# Patient Record
Sex: FEMALE | Race: BLACK OR AFRICAN AMERICAN | Employment: OTHER | URBAN - METROPOLITAN AREA
[De-identification: names, ages, dates, MRNs, and addresses within clinical notes are randomized per-mention and may not be internally consistent; named-entity substitution may affect disease eponyms.]

---

## 2017-02-15 PROBLEM — D22.9 ATYPICAL NEVI: Status: ACTIVE | Noted: 2017-02-15

## 2017-05-10 PROBLEM — M17.12 OSTEOARTHRITIS OF LEFT KNEE: Status: ACTIVE | Noted: 2017-05-10

## 2017-05-11 PROBLEM — K44.9 HIATAL HERNIA: Status: ACTIVE | Noted: 2017-05-11

## 2017-05-11 PROBLEM — R60.0 LOCALIZED EDEMA: Status: ACTIVE | Noted: 2017-05-11

## 2017-05-19 ENCOUNTER — HOSPITAL ENCOUNTER (OUTPATIENT)
Age: 67
Discharge: HOME | End: 2017-05-19
Payer: MEDICARE

## 2017-05-19 DIAGNOSIS — R73.09 HEMOGLOBIN A1C ABOVE REFERENCE RANGE: ICD-10-CM

## 2017-05-19 LAB
ALBUMIN SERPL-MCNC: 3.9 G/DL (ref 3.5–5.2)
ALBUMIN/GLOBULIN RATIO: ABNORMAL (ref 1–2.5)
ALP BLD-CCNC: 105 U/L (ref 35–104)
ALT SERPL-CCNC: 11 U/L (ref 5–33)
ANION GAP SERPL CALCULATED.3IONS-SCNC: 14 MMOL/L (ref 9–17)
AST SERPL-CCNC: 15 U/L
BILIRUB SERPL-MCNC: 0.2 MG/DL (ref 0.3–1.2)
BUN BLDV-MCNC: 11 MG/DL (ref 8–23)
BUN/CREAT BLD: 10 (ref 9–20)
CALCIUM SERPL-MCNC: 9.8 MG/DL (ref 8.6–10.4)
CHLORIDE BLD-SCNC: 98 MMOL/L (ref 98–107)
CO2: 28 MMOL/L (ref 20–31)
CREAT SERPL-MCNC: 1.1 MG/DL (ref 0.5–0.9)
GFR AFRICAN AMERICAN: >60 ML/MIN
GFR NON-AFRICAN AMERICAN: 50 ML/MIN
GFR SERPL CREATININE-BSD FRML MDRD: ABNORMAL ML/MIN/{1.73_M2}
GFR SERPL CREATININE-BSD FRML MDRD: ABNORMAL ML/MIN/{1.73_M2}
GLUCOSE FASTING: 100 MG/DL (ref 70–99)
POTASSIUM SERPL-SCNC: 4.4 MMOL/L (ref 3.7–5.3)
SODIUM BLD-SCNC: 140 MMOL/L (ref 135–144)
TOTAL PROTEIN: 8 G/DL (ref 6.4–8.3)

## 2017-05-19 PROCEDURE — 36415 COLL VENOUS BLD VENIPUNCTURE: CPT

## 2017-05-19 PROCEDURE — 80053 COMPREHEN METABOLIC PANEL: CPT

## 2017-08-14 ENCOUNTER — HOSPITAL ENCOUNTER (OUTPATIENT)
Age: 67
Discharge: HOME | End: 2017-08-14
Payer: MEDICARE

## 2017-08-14 ENCOUNTER — HOSPITAL ENCOUNTER (OUTPATIENT)
Dept: GENERAL RADIOLOGY | Age: 67
Discharge: HOME | End: 2017-08-14
Payer: MEDICARE

## 2017-08-14 DIAGNOSIS — R73.09 HEMOGLOBIN A1C ABOVE REFERENCE RANGE: ICD-10-CM

## 2017-08-14 DIAGNOSIS — E03.9 HYPOTHYROIDISM, UNSPECIFIED TYPE: ICD-10-CM

## 2017-08-14 DIAGNOSIS — M51.36 DDD (DEGENERATIVE DISC DISEASE), LUMBAR: ICD-10-CM

## 2017-08-14 LAB
ABSOLUTE EOS #: 0.5 K/UL (ref 0–0.4)
ABSOLUTE LYMPH #: 2.6 K/UL (ref 1–4.8)
ABSOLUTE MONO #: 0.8 K/UL (ref 0.2–0.8)
ALBUMIN SERPL-MCNC: 4.1 G/DL (ref 3.5–5.2)
ALBUMIN/GLOBULIN RATIO: ABNORMAL (ref 1–2.5)
ALP BLD-CCNC: 105 U/L (ref 35–104)
ALT SERPL-CCNC: 14 U/L (ref 5–33)
ANION GAP SERPL CALCULATED.3IONS-SCNC: 12 MMOL/L (ref 9–17)
AST SERPL-CCNC: 13 U/L
BASOPHILS # BLD: 1 %
BASOPHILS ABSOLUTE: 0.1 K/UL (ref 0–0.2)
BILIRUB SERPL-MCNC: 0.24 MG/DL (ref 0.3–1.2)
BUN BLDV-MCNC: 17 MG/DL (ref 8–23)
BUN/CREAT BLD: 15 (ref 9–20)
CALCIUM SERPL-MCNC: 10 MG/DL (ref 8.6–10.4)
CHLORIDE BLD-SCNC: 100 MMOL/L (ref 98–107)
CHOLESTEROL/HDL RATIO: 3.7
CHOLESTEROL: 172 MG/DL
CO2: 30 MMOL/L (ref 20–31)
CREAT SERPL-MCNC: 1.16 MG/DL (ref 0.5–0.9)
DIFFERENTIAL TYPE: ABNORMAL
EOSINOPHILS RELATIVE PERCENT: 4 %
ESTIMATED AVERAGE GLUCOSE: 131 MG/DL
GFR AFRICAN AMERICAN: 56 ML/MIN
GFR NON-AFRICAN AMERICAN: 47 ML/MIN
GFR SERPL CREATININE-BSD FRML MDRD: ABNORMAL ML/MIN/{1.73_M2}
GFR SERPL CREATININE-BSD FRML MDRD: ABNORMAL ML/MIN/{1.73_M2}
GLUCOSE BLD-MCNC: 108 MG/DL (ref 70–99)
HBA1C MFR BLD: 6.2 % (ref 4–6)
HCT VFR BLD CALC: 44.6 % (ref 36–46)
HDLC SERPL-MCNC: 46 MG/DL
HEMOGLOBIN: 14.1 G/DL (ref 12–16)
INSULIN COMMENT: NORMAL
INSULIN REFERENCE RANGE:: NORMAL
INSULIN: 23.3 MU/L
LDL CHOLESTEROL: 90 MG/DL (ref 0–130)
LYMPHOCYTES # BLD: 20 %
MCH RBC QN AUTO: 26.4 PG (ref 26–34)
MCHC RBC AUTO-ENTMCNC: 31.6 G/DL (ref 31–37)
MCV RBC AUTO: 83.5 FL (ref 80–100)
MONOCYTES # BLD: 7 %
PDW BLD-RTO: 16.8 % (ref 11.5–14.5)
PLATELET # BLD: 472 K/UL (ref 130–400)
PLATELET ESTIMATE: ABNORMAL
PMV BLD AUTO: 7.1 FL (ref 6–12)
POTASSIUM SERPL-SCNC: 5 MMOL/L (ref 3.7–5.3)
RBC # BLD: 5.34 M/UL (ref 4–5.2)
RBC # BLD: ABNORMAL 10*6/UL
SEG NEUTROPHILS: 68 %
SEGMENTED NEUTROPHILS ABSOLUTE COUNT: 8.7 K/UL (ref 1.8–7.7)
SODIUM BLD-SCNC: 142 MMOL/L (ref 135–144)
T3 FREE: 2.88 PG/ML (ref 2.02–4.43)
TOTAL PROTEIN: 8.1 G/DL (ref 6.4–8.3)
TRIGL SERPL-MCNC: 181 MG/DL
TSH SERPL DL<=0.05 MIU/L-ACNC: 3.47 MIU/L (ref 0.3–5)
VLDLC SERPL CALC-MCNC: ABNORMAL MG/DL (ref 1–30)
WBC # BLD: 12.7 K/UL (ref 3.5–11)
WBC # BLD: ABNORMAL 10*3/UL

## 2017-08-14 PROCEDURE — 84481 FREE ASSAY (FT-3): CPT

## 2017-08-14 PROCEDURE — 72110 X-RAY EXAM L-2 SPINE 4/>VWS: CPT

## 2017-08-14 PROCEDURE — 83525 ASSAY OF INSULIN: CPT

## 2017-08-14 PROCEDURE — 36415 COLL VENOUS BLD VENIPUNCTURE: CPT

## 2017-08-14 PROCEDURE — 85025 COMPLETE CBC W/AUTO DIFF WBC: CPT

## 2017-08-14 PROCEDURE — 80061 LIPID PANEL: CPT

## 2017-08-14 PROCEDURE — 84443 ASSAY THYROID STIM HORMONE: CPT

## 2017-08-14 PROCEDURE — 80053 COMPREHEN METABOLIC PANEL: CPT

## 2017-08-14 PROCEDURE — 83036 HEMOGLOBIN GLYCOSYLATED A1C: CPT

## 2017-08-28 PROBLEM — D72.829 LEUKOCYTOSIS: Status: ACTIVE | Noted: 2017-08-28

## 2017-08-28 PROBLEM — M47.816 OSTEOARTHRITIS OF LUMBAR SPINE: Status: ACTIVE | Noted: 2017-08-28

## 2017-08-28 PROBLEM — M47.812 OSTEOARTHRITIS OF CERVICAL SPINE: Status: ACTIVE | Noted: 2017-08-28

## 2018-06-01 ENCOUNTER — HOSPITAL ENCOUNTER (OUTPATIENT)
Age: 68
Discharge: HOME | End: 2018-06-01
Payer: MEDICARE

## 2018-06-01 DIAGNOSIS — R73.09 HEMOGLOBIN A1C ABOVE REFERENCE RANGE: ICD-10-CM

## 2018-06-01 LAB
ALBUMIN SERPL-MCNC: 3.9 G/DL (ref 3.5–5.2)
ALBUMIN/GLOBULIN RATIO: ABNORMAL (ref 1–2.5)
ALP BLD-CCNC: 113 U/L (ref 35–104)
ALT SERPL-CCNC: 13 U/L (ref 5–33)
ANION GAP SERPL CALCULATED.3IONS-SCNC: 12 MMOL/L (ref 9–17)
AST SERPL-CCNC: 14 U/L
BILIRUB SERPL-MCNC: 0.26 MG/DL (ref 0.3–1.2)
BUN BLDV-MCNC: 9 MG/DL (ref 8–23)
BUN/CREAT BLD: 8 (ref 9–20)
CALCIUM SERPL-MCNC: 9.6 MG/DL (ref 8.6–10.4)
CHLORIDE BLD-SCNC: 101 MMOL/L (ref 98–107)
CO2: 28 MMOL/L (ref 20–31)
CREAT SERPL-MCNC: 1.09 MG/DL (ref 0.5–0.9)
GFR AFRICAN AMERICAN: >60 ML/MIN
GFR NON-AFRICAN AMERICAN: 50 ML/MIN
GFR SERPL CREATININE-BSD FRML MDRD: ABNORMAL ML/MIN/{1.73_M2}
GFR SERPL CREATININE-BSD FRML MDRD: ABNORMAL ML/MIN/{1.73_M2}
GLUCOSE BLD-MCNC: 107 MG/DL (ref 70–99)
POTASSIUM SERPL-SCNC: 4.5 MMOL/L (ref 3.7–5.3)
SODIUM BLD-SCNC: 141 MMOL/L (ref 135–144)
TOTAL PROTEIN: 8 G/DL (ref 6.4–8.3)

## 2018-06-01 PROCEDURE — 80053 COMPREHEN METABOLIC PANEL: CPT

## 2018-06-01 PROCEDURE — 36415 COLL VENOUS BLD VENIPUNCTURE: CPT

## 2018-06-01 PROCEDURE — 83036 HEMOGLOBIN GLYCOSYLATED A1C: CPT

## 2018-06-03 LAB
ESTIMATED AVERAGE GLUCOSE: 134 MG/DL
HBA1C MFR BLD: 6.3 % (ref 4–6)

## 2018-07-03 PROBLEM — G47.00 INSOMNIA: Status: ACTIVE | Noted: 2018-07-03

## 2018-09-19 ENCOUNTER — HOSPITAL ENCOUNTER (OUTPATIENT)
Dept: CT IMAGING | Age: 68
Discharge: HOME | End: 2018-09-21
Payer: MEDICARE

## 2018-09-19 DIAGNOSIS — M54.16 LUMBAR RADICULOPATHY: ICD-10-CM

## 2018-09-19 PROCEDURE — 72131 CT LUMBAR SPINE W/O DYE: CPT

## 2018-11-26 ENCOUNTER — HOSPITAL ENCOUNTER (OUTPATIENT)
Age: 68
Discharge: HOME | End: 2018-11-26
Payer: MEDICARE

## 2018-11-26 DIAGNOSIS — R73.09 HEMOGLOBIN A1C ABOVE REFERENCE RANGE: ICD-10-CM

## 2018-11-26 DIAGNOSIS — I10 ESSENTIAL HYPERTENSION: ICD-10-CM

## 2018-11-26 DIAGNOSIS — E78.1 HIGH TRIGLYCERIDES: ICD-10-CM

## 2018-11-26 DIAGNOSIS — D72.829 LEUKOCYTOSIS, UNSPECIFIED TYPE: ICD-10-CM

## 2018-11-26 DIAGNOSIS — E55.9 VITAMIN D DEFICIENCY: ICD-10-CM

## 2018-11-26 DIAGNOSIS — E03.9 HYPOTHYROIDISM, UNSPECIFIED TYPE: ICD-10-CM

## 2018-11-26 PROBLEM — E66.01 MORBID OBESITY (HCC): Status: ACTIVE | Noted: 2018-11-26

## 2018-11-26 LAB
ABSOLUTE EOS #: 0.37 K/UL (ref 0–0.44)
ABSOLUTE IMMATURE GRANULOCYTE: 0.1 K/UL (ref 0–0.3)
ABSOLUTE LYMPH #: 2.57 K/UL (ref 1.1–3.7)
ABSOLUTE MONO #: 0.94 K/UL (ref 0.1–1.2)
ALBUMIN SERPL-MCNC: 3.9 G/DL (ref 3.5–5.2)
ALBUMIN/GLOBULIN RATIO: 0.9 (ref 1–2.5)
ALP BLD-CCNC: 121 U/L (ref 35–104)
ALT SERPL-CCNC: 19 U/L (ref 5–33)
ANION GAP SERPL CALCULATED.3IONS-SCNC: 13 MMOL/L (ref 9–17)
AST SERPL-CCNC: 19 U/L
BASOPHILS # BLD: 1 % (ref 0–2)
BASOPHILS ABSOLUTE: 0.09 K/UL (ref 0–0.2)
BILIRUB SERPL-MCNC: 0.24 MG/DL (ref 0.3–1.2)
BUN BLDV-MCNC: 12 MG/DL (ref 8–23)
BUN/CREAT BLD: ABNORMAL (ref 9–20)
CALCIUM SERPL-MCNC: 9.6 MG/DL (ref 8.6–10.4)
CHLORIDE BLD-SCNC: 101 MMOL/L (ref 98–107)
CHOLESTEROL, FASTING: 152 MG/DL
CHOLESTEROL/HDL RATIO: 3.2
CO2: 27 MMOL/L (ref 20–31)
CREAT SERPL-MCNC: 1.12 MG/DL (ref 0.5–0.9)
DIFFERENTIAL TYPE: ABNORMAL
EOSINOPHILS RELATIVE PERCENT: 2 % (ref 1–4)
GFR AFRICAN AMERICAN: 58 ML/MIN
GFR NON-AFRICAN AMERICAN: 48 ML/MIN
GFR SERPL CREATININE-BSD FRML MDRD: ABNORMAL ML/MIN/{1.73_M2}
GFR SERPL CREATININE-BSD FRML MDRD: ABNORMAL ML/MIN/{1.73_M2}
GLUCOSE BLD-MCNC: 95 MG/DL (ref 70–99)
HCT VFR BLD CALC: 46.3 % (ref 36.3–47.1)
HDLC SERPL-MCNC: 47 MG/DL
HEMOGLOBIN: 13.3 G/DL (ref 11.9–15.1)
IMMATURE GRANULOCYTES: 1 %
INSULIN COMMENT: NORMAL
INSULIN REFERENCE RANGE:: NORMAL
INSULIN: 17.7 MU/L
LDL CHOLESTEROL: 85 MG/DL (ref 0–130)
LYMPHOCYTES # BLD: 17 % (ref 24–43)
MCH RBC QN AUTO: 24.8 PG (ref 25.2–33.5)
MCHC RBC AUTO-ENTMCNC: 28.7 G/DL (ref 28.4–34.8)
MCV RBC AUTO: 86.4 FL (ref 82.6–102.9)
MONOCYTES # BLD: 6 % (ref 3–12)
NRBC AUTOMATED: 0 PER 100 WBC
PDW BLD-RTO: 15.9 % (ref 11.8–14.4)
PLATELET # BLD: 495 K/UL (ref 138–453)
PLATELET ESTIMATE: ABNORMAL
PMV BLD AUTO: 9.1 FL (ref 8.1–13.5)
POTASSIUM SERPL-SCNC: 4.5 MMOL/L (ref 3.7–5.3)
RBC # BLD: 5.36 M/UL (ref 3.95–5.11)
RBC # BLD: ABNORMAL 10*6/UL
SEG NEUTROPHILS: 73 % (ref 36–65)
SEGMENTED NEUTROPHILS ABSOLUTE COUNT: 11.24 K/UL (ref 1.5–8.1)
SODIUM BLD-SCNC: 141 MMOL/L (ref 135–144)
T3 FREE: 2.05 PG/ML (ref 2.02–4.43)
THYROXINE, FREE: 0.93 NG/DL (ref 0.93–1.7)
TOTAL PROTEIN: 8.1 G/DL (ref 6.4–8.3)
TRIGLYCERIDE, FASTING: 98 MG/DL
TSH SERPL DL<=0.05 MIU/L-ACNC: 1.59 MIU/L (ref 0.3–5)
VLDLC SERPL CALC-MCNC: NORMAL MG/DL (ref 1–30)
WBC # BLD: 15.3 K/UL (ref 3.5–11.3)
WBC # BLD: ABNORMAL 10*3/UL

## 2018-11-26 PROCEDURE — 84439 ASSAY OF FREE THYROXINE: CPT

## 2018-11-26 PROCEDURE — 83036 HEMOGLOBIN GLYCOSYLATED A1C: CPT

## 2018-11-26 PROCEDURE — 84481 FREE ASSAY (FT-3): CPT

## 2018-11-26 PROCEDURE — 82652 VIT D 1 25-DIHYDROXY: CPT

## 2018-11-26 PROCEDURE — 83525 ASSAY OF INSULIN: CPT

## 2018-11-26 PROCEDURE — 36415 COLL VENOUS BLD VENIPUNCTURE: CPT

## 2018-11-26 PROCEDURE — 80061 LIPID PANEL: CPT

## 2018-11-26 PROCEDURE — 80053 COMPREHEN METABOLIC PANEL: CPT

## 2018-11-26 PROCEDURE — 84443 ASSAY THYROID STIM HORMONE: CPT

## 2018-11-26 PROCEDURE — 85025 COMPLETE CBC W/AUTO DIFF WBC: CPT

## 2018-11-26 PROCEDURE — 84482 T3 REVERSE: CPT

## 2018-11-27 LAB
ESTIMATED AVERAGE GLUCOSE: 128 MG/DL
HBA1C MFR BLD: 6.1 % (ref 4–6)

## 2018-11-29 LAB
T3 REVERSE: 18.3 NG/DL (ref 9–27)
VITAMIN D 1,25-DIHYDROXY: 59.3 PG/ML (ref 19.9–79.3)

## 2018-12-05 ENCOUNTER — HOSPITAL ENCOUNTER (OUTPATIENT)
Dept: MAMMOGRAPHY | Age: 68
Discharge: HOME | End: 2018-12-07
Payer: MEDICARE

## 2018-12-05 DIAGNOSIS — Z12.39 SCREENING FOR BREAST CANCER: ICD-10-CM

## 2018-12-05 PROCEDURE — 77067 SCR MAMMO BI INCL CAD: CPT

## 2019-02-06 ENCOUNTER — HOSPITAL ENCOUNTER (OUTPATIENT)
Age: 69
Discharge: HOME | End: 2019-02-06
Payer: MEDICARE

## 2019-02-06 DIAGNOSIS — D72.829 LEUKOCYTOSIS, UNSPECIFIED TYPE: ICD-10-CM

## 2019-02-06 LAB
ABSOLUTE EOS #: 0.43 K/UL (ref 0–0.44)
ABSOLUTE IMMATURE GRANULOCYTE: 0.08 K/UL (ref 0–0.3)
ABSOLUTE LYMPH #: 3.5 K/UL (ref 1.1–3.7)
ABSOLUTE MONO #: 0.95 K/UL (ref 0.1–1.2)
BASOPHILS # BLD: 1 % (ref 0–2)
BASOPHILS ABSOLUTE: 0.11 K/UL (ref 0–0.2)
DIFFERENTIAL TYPE: ABNORMAL
EOSINOPHILS RELATIVE PERCENT: 3 % (ref 1–4)
HCT VFR BLD CALC: 47.8 % (ref 36.3–47.1)
HEMOGLOBIN: 14.1 G/DL (ref 11.9–15.1)
IMMATURE GRANULOCYTES: 1 %
LYMPHOCYTES # BLD: 25 % (ref 24–43)
MCH RBC QN AUTO: 25.1 PG (ref 25.2–33.5)
MCHC RBC AUTO-ENTMCNC: 29.5 G/DL (ref 28.4–34.8)
MCV RBC AUTO: 85.1 FL (ref 82.6–102.9)
MONOCYTES # BLD: 7 % (ref 3–12)
NRBC AUTOMATED: 0 PER 100 WBC
PDW BLD-RTO: 16.4 % (ref 11.8–14.4)
PLATELET # BLD: 590 K/UL (ref 138–453)
PLATELET ESTIMATE: ABNORMAL
PMV BLD AUTO: 9 FL (ref 8.1–13.5)
RBC # BLD: 5.62 M/UL (ref 3.95–5.11)
RBC # BLD: ABNORMAL 10*6/UL
SEG NEUTROPHILS: 63 % (ref 36–65)
SEGMENTED NEUTROPHILS ABSOLUTE COUNT: 8.89 K/UL (ref 1.5–8.1)
WBC # BLD: 14 K/UL (ref 3.5–11.3)
WBC # BLD: ABNORMAL 10*3/UL

## 2019-02-06 PROCEDURE — 36415 COLL VENOUS BLD VENIPUNCTURE: CPT

## 2019-02-06 PROCEDURE — 85025 COMPLETE CBC W/AUTO DIFF WBC: CPT

## 2019-02-25 PROBLEM — R42 DIZZINESS: Status: ACTIVE | Noted: 2019-02-25

## 2019-03-07 PROBLEM — R35.0 FREQUENT URINATION: Status: ACTIVE | Noted: 2019-03-07

## 2019-04-18 PROBLEM — M25.552 LEFT HIP PAIN: Status: ACTIVE | Noted: 2019-04-18

## 2019-04-18 PROBLEM — G89.29 CHRONIC PAIN OF LEFT KNEE: Status: ACTIVE | Noted: 2019-04-18

## 2019-04-18 PROBLEM — M25.562 CHRONIC PAIN OF LEFT KNEE: Status: ACTIVE | Noted: 2019-04-18

## 2019-04-24 ENCOUNTER — HOSPITAL ENCOUNTER (OUTPATIENT)
Dept: GENERAL RADIOLOGY | Age: 69
Discharge: HOME | End: 2019-04-26
Payer: MEDICARE

## 2019-04-24 ENCOUNTER — HOSPITAL ENCOUNTER (OUTPATIENT)
Age: 69
Discharge: HOME | End: 2019-04-26
Payer: MEDICARE

## 2019-04-24 DIAGNOSIS — M25.562 CHRONIC PAIN OF LEFT KNEE: ICD-10-CM

## 2019-04-24 DIAGNOSIS — G89.29 CHRONIC PAIN OF LEFT KNEE: ICD-10-CM

## 2019-04-24 DIAGNOSIS — M25.552 LEFT HIP PAIN: ICD-10-CM

## 2019-04-24 PROCEDURE — 73502 X-RAY EXAM HIP UNI 2-3 VIEWS: CPT

## 2019-04-24 PROCEDURE — 73562 X-RAY EXAM OF KNEE 3: CPT

## 2019-06-05 PROBLEM — R35.0 FREQUENT URINATION: Status: RESOLVED | Noted: 2019-03-07 | Resolved: 2019-06-05

## 2019-06-17 ENCOUNTER — HOSPITAL ENCOUNTER (OUTPATIENT)
Age: 69
Setting detail: SPECIMEN
Discharge: HOME | End: 2019-06-17
Payer: MEDICARE

## 2019-06-20 LAB — SURGICAL PATHOLOGY REPORT: NORMAL

## 2019-08-14 PROBLEM — Z23 NEED FOR SHINGLES VACCINE: Status: ACTIVE | Noted: 2019-08-14

## 2019-08-14 PROBLEM — Z23 NEED FOR ZOSTER VACCINE: Status: ACTIVE | Noted: 2019-08-14

## 2019-08-14 PROBLEM — Z87.891 PERSONAL HISTORY OF TOBACCO USE: Status: ACTIVE | Noted: 2019-08-14

## 2019-08-14 PROBLEM — Z23 NEED FOR PNEUMOCOCCAL VACCINATION: Status: ACTIVE | Noted: 2019-08-14

## 2019-11-26 ENCOUNTER — HOSPITAL ENCOUNTER (OUTPATIENT)
Age: 69
Discharge: HOME | End: 2019-11-26
Payer: MEDICARE

## 2019-11-26 DIAGNOSIS — E03.9 HYPOTHYROIDISM, UNSPECIFIED TYPE: ICD-10-CM

## 2019-11-26 LAB
ABSOLUTE EOS #: 0.48 K/UL (ref 0–0.44)
ABSOLUTE IMMATURE GRANULOCYTE: 0.05 K/UL (ref 0–0.3)
ABSOLUTE LYMPH #: 2.86 K/UL (ref 1.1–3.7)
ABSOLUTE MONO #: 0.89 K/UL (ref 0.1–1.2)
ALBUMIN SERPL-MCNC: 3.9 G/DL (ref 3.5–5.2)
ALBUMIN/GLOBULIN RATIO: 0.8 (ref 1–2.5)
ALP BLD-CCNC: 111 U/L (ref 35–104)
ALT SERPL-CCNC: 12 U/L (ref 5–33)
ANION GAP SERPL CALCULATED.3IONS-SCNC: 15 MMOL/L (ref 9–17)
AST SERPL-CCNC: 16 U/L
BASOPHILS # BLD: 1 % (ref 0–2)
BASOPHILS ABSOLUTE: 0.09 K/UL (ref 0–0.2)
BILIRUB SERPL-MCNC: 0.28 MG/DL (ref 0.3–1.2)
BUN BLDV-MCNC: 9 MG/DL (ref 8–23)
BUN/CREAT BLD: ABNORMAL (ref 9–20)
CALCIUM SERPL-MCNC: 9.9 MG/DL (ref 8.6–10.4)
CHLORIDE BLD-SCNC: 103 MMOL/L (ref 98–107)
CO2: 25 MMOL/L (ref 20–31)
CREAT SERPL-MCNC: 1.05 MG/DL (ref 0.5–0.9)
DIFFERENTIAL TYPE: ABNORMAL
EOSINOPHILS RELATIVE PERCENT: 4 % (ref 1–4)
ESTIMATED AVERAGE GLUCOSE: 128 MG/DL
GFR AFRICAN AMERICAN: >60 ML/MIN
GFR NON-AFRICAN AMERICAN: 52 ML/MIN
GFR SERPL CREATININE-BSD FRML MDRD: ABNORMAL ML/MIN/{1.73_M2}
GFR SERPL CREATININE-BSD FRML MDRD: ABNORMAL ML/MIN/{1.73_M2}
GLUCOSE BLD-MCNC: 112 MG/DL (ref 70–99)
HBA1C MFR BLD: 6.1 % (ref 4–6)
HCT VFR BLD CALC: 47.5 % (ref 36.3–47.1)
HEMOGLOBIN: 14 G/DL (ref 11.9–15.1)
IMMATURE GRANULOCYTES: 0 %
INSULIN COMMENT: NORMAL
INSULIN REFERENCE RANGE:: NORMAL
INSULIN: 21 MU/L
LYMPHOCYTES # BLD: 25 % (ref 24–43)
MCH RBC QN AUTO: 25 PG (ref 25.2–33.5)
MCHC RBC AUTO-ENTMCNC: 29.5 G/DL (ref 28.4–34.8)
MCV RBC AUTO: 85 FL (ref 82.6–102.9)
MONOCYTES # BLD: 8 % (ref 3–12)
NRBC AUTOMATED: 0 PER 100 WBC
PDW BLD-RTO: 15.6 % (ref 11.8–14.4)
PLATELET # BLD: 553 K/UL (ref 138–453)
PLATELET ESTIMATE: ABNORMAL
PMV BLD AUTO: 9.3 FL (ref 8.1–13.5)
POTASSIUM SERPL-SCNC: 4.4 MMOL/L (ref 3.7–5.3)
RBC # BLD: 5.59 M/UL (ref 3.95–5.11)
RBC # BLD: ABNORMAL 10*6/UL
SEG NEUTROPHILS: 62 % (ref 36–65)
SEGMENTED NEUTROPHILS ABSOLUTE COUNT: 7.26 K/UL (ref 1.5–8.1)
SODIUM BLD-SCNC: 143 MMOL/L (ref 135–144)
T3 FREE: 2.52 PG/ML (ref 2.02–4.43)
THYROXINE, FREE: 0.97 NG/DL (ref 0.93–1.7)
TOTAL PROTEIN: 8.5 G/DL (ref 6.4–8.3)
TSH SERPL DL<=0.05 MIU/L-ACNC: 2.33 MIU/L (ref 0.3–5)
WBC # BLD: 11.6 K/UL (ref 3.5–11.3)
WBC # BLD: ABNORMAL 10*3/UL

## 2019-11-26 PROCEDURE — 84443 ASSAY THYROID STIM HORMONE: CPT

## 2019-11-26 PROCEDURE — 84439 ASSAY OF FREE THYROXINE: CPT

## 2019-11-26 PROCEDURE — 85025 COMPLETE CBC W/AUTO DIFF WBC: CPT

## 2019-11-26 PROCEDURE — 84482 T3 REVERSE: CPT

## 2019-11-26 PROCEDURE — 83525 ASSAY OF INSULIN: CPT

## 2019-11-26 PROCEDURE — 83036 HEMOGLOBIN GLYCOSYLATED A1C: CPT

## 2019-11-26 PROCEDURE — 36415 COLL VENOUS BLD VENIPUNCTURE: CPT

## 2019-11-26 PROCEDURE — 84481 FREE ASSAY (FT-3): CPT

## 2019-11-26 PROCEDURE — 80053 COMPREHEN METABOLIC PANEL: CPT

## 2019-11-29 LAB — T3 REVERSE: 18.1 NG/DL (ref 9–27)

## 2019-12-04 ENCOUNTER — TELEPHONE (OUTPATIENT)
Dept: ONCOLOGY | Age: 69
End: 2019-12-04

## 2019-12-11 ENCOUNTER — HOSPITAL ENCOUNTER (OUTPATIENT)
Dept: CT IMAGING | Age: 69
Discharge: HOME | End: 2019-12-13
Payer: MEDICARE

## 2019-12-11 DIAGNOSIS — Z87.891 PERSONAL HISTORY OF TOBACCO USE: ICD-10-CM

## 2019-12-11 PROCEDURE — G0297 LDCT FOR LUNG CA SCREEN: HCPCS

## 2020-08-27 ENCOUNTER — TELEPHONE (OUTPATIENT)
Dept: INTERVENTIONAL RADIOLOGY/VASCULAR | Age: 70
End: 2020-08-27

## 2020-08-27 ENCOUNTER — OFFICE VISIT (OUTPATIENT)
Dept: ORTHOPEDIC SURGERY | Age: 70
End: 2020-08-27
Payer: MEDICARE

## 2020-08-27 VITALS — WEIGHT: 293 LBS | BODY MASS INDEX: 47.3 KG/M2

## 2020-08-27 PROBLEM — G89.29 CHRONIC LOW BACK PAIN: Status: ACTIVE | Noted: 2020-08-27

## 2020-08-27 PROBLEM — M54.50 CHRONIC LOW BACK PAIN: Status: ACTIVE | Noted: 2020-08-27

## 2020-08-27 PROBLEM — M47.816 FACET ARTHRITIS, DEGENERATIVE, LUMBAR SPINE: Status: ACTIVE | Noted: 2020-08-27

## 2020-08-27 PROCEDURE — G8427 DOCREV CUR MEDS BY ELIG CLIN: HCPCS | Performed by: ORTHOPAEDIC SURGERY

## 2020-08-27 PROCEDURE — G8399 PT W/DXA RESULTS DOCUMENT: HCPCS | Performed by: ORTHOPAEDIC SURGERY

## 2020-08-27 PROCEDURE — 1123F ACP DISCUSS/DSCN MKR DOCD: CPT | Performed by: ORTHOPAEDIC SURGERY

## 2020-08-27 PROCEDURE — 3017F COLORECTAL CA SCREEN DOC REV: CPT | Performed by: ORTHOPAEDIC SURGERY

## 2020-08-27 PROCEDURE — 4040F PNEUMOC VAC/ADMIN/RCVD: CPT | Performed by: ORTHOPAEDIC SURGERY

## 2020-08-27 PROCEDURE — 1090F PRES/ABSN URINE INCON ASSESS: CPT | Performed by: ORTHOPAEDIC SURGERY

## 2020-08-27 PROCEDURE — 4004F PT TOBACCO SCREEN RCVD TLK: CPT | Performed by: ORTHOPAEDIC SURGERY

## 2020-08-27 PROCEDURE — G8417 CALC BMI ABV UP PARAM F/U: HCPCS | Performed by: ORTHOPAEDIC SURGERY

## 2020-08-27 PROCEDURE — 99203 OFFICE O/P NEW LOW 30 MIN: CPT | Performed by: ORTHOPAEDIC SURGERY

## 2020-08-27 NOTE — PROGRESS NOTES
Patient ID: Jaziel Bellamy is a 78 y.o. female. Chief Complaint   Patient presents with    New Patient     lower back pain        HPI    Patient presents with chronic low back pain. Patient has been treated by pain management over an extended period of time with 7 or 8 different procedures. Patient reports her pain is predominantly in the lumbar junction lumbosacral junction with radiation to the upper buttocks. She is walk with a cane for several years. Patient does have a history of a cerebral aneurysm treated with clips. Patient also has complaints of left knee pain. Past Medical History:   Diagnosis Date    Alkaline phosphatase elevation 06/09/2016    stable last 2 check    Bronchitis 10/18/2016    Exercise counseling 3/10/2016    Frequent urination 3/7/2019    Headache(784.0)     Hypertension     Leukocytosis 4/11/2016    Obesity     Stroke ([address]     Viral upper respiratory tract infection 6/9/2016     Past Surgical History:   Procedure Laterality Date    BRAIN SURGERY      for Cerebral aneurysm     Family History   Problem Relation Age of Onset    High Blood Pressure Mother     Diabetes Mother     Alcohol Abuse Father     High Blood Pressure Sister     Diabetes Sister     Cancer Sister         leukemia    Alcohol Abuse Sister     Thyroid Disease Other     Breast Cancer Sister         aunt     Cancer Maternal Aunt         breast     Social History     Occupational History    Not on file   Tobacco Use    Smoking status: Current Every Day Smoker     Packs/day: 1.00     Years: 50.00     Pack years: 50.00     Types: Cigarettes    Smokeless tobacco: Never Used   Substance and Sexual Activity    Alcohol use: No    Drug use: No    Sexual activity: Not Currently        Review of Systems         Physical Exam  Vitals signs and nursing note reviewed. Constitutional:       Appearance: She is well-developed. HENT:      Head: Normocephalic and atraumatic. haveoccurred.

## 2020-09-08 PROBLEM — E78.5 DYSLIPIDEMIA: Status: ACTIVE | Noted: 2020-09-08

## 2020-09-09 RX ORDER — SODIUM CHLORIDE 0.9 % (FLUSH) 0.9 %
10 SYRINGE (ML) INJECTION PRN
Status: CANCELLED | OUTPATIENT
Start: 2020-09-10

## 2020-09-09 RX ORDER — SODIUM CHLORIDE 9 MG/ML
INJECTION, SOLUTION INTRAVENOUS CONTINUOUS
Status: CANCELLED | OUTPATIENT
Start: 2020-09-10

## 2020-09-10 ENCOUNTER — HOSPITAL ENCOUNTER (OUTPATIENT)
Dept: INTERVENTIONAL RADIOLOGY/VASCULAR | Age: 70
Discharge: HOME | End: 2020-09-12
Payer: MEDICARE

## 2020-09-10 ENCOUNTER — HOSPITAL ENCOUNTER (OUTPATIENT)
Dept: CT IMAGING | Age: 70
Discharge: HOME | End: 2020-09-12
Payer: MEDICARE

## 2020-09-10 VITALS
DIASTOLIC BLOOD PRESSURE: 92 MMHG | SYSTOLIC BLOOD PRESSURE: 155 MMHG | TEMPERATURE: 97.1 F | OXYGEN SATURATION: 96 % | RESPIRATION RATE: 20 BRPM | HEART RATE: 80 BPM

## 2020-09-10 VITALS
WEIGHT: 293 LBS | BODY MASS INDEX: 45.99 KG/M2 | DIASTOLIC BLOOD PRESSURE: 79 MMHG | HEIGHT: 67 IN | SYSTOLIC BLOOD PRESSURE: 131 MMHG | OXYGEN SATURATION: 99 % | HEART RATE: 68 BPM | TEMPERATURE: 97.7 F | RESPIRATION RATE: 14 BRPM

## 2020-09-10 PROBLEM — Z00.00 ROUTINE GENERAL MEDICAL EXAMINATION AT A HEALTH CARE FACILITY: Status: ACTIVE | Noted: 2020-09-10

## 2020-09-10 PROBLEM — Z12.31 ENCOUNTER FOR SCREENING MAMMOGRAM FOR BREAST CANCER: Status: ACTIVE | Noted: 2020-09-10

## 2020-09-10 LAB
INR BLD: 1.1
PARTIAL THROMBOPLASTIN TIME: 29.4 SEC (ref 23.9–33.8)
PLATELET # BLD: 428 K/UL (ref 138–453)
PROTHROMBIN TIME: 13.7 SEC (ref 11.5–14.2)

## 2020-09-10 PROCEDURE — 85049 AUTOMATED PLATELET COUNT: CPT

## 2020-09-10 PROCEDURE — 7100000010 HC PHASE II RECOVERY - FIRST 15 MIN

## 2020-09-10 PROCEDURE — 62284 INJECTION FOR MYELOGRAM: CPT | Performed by: RADIOLOGY

## 2020-09-10 PROCEDURE — 85730 THROMBOPLASTIN TIME PARTIAL: CPT

## 2020-09-10 PROCEDURE — 7100000011 HC PHASE II RECOVERY - ADDTL 15 MIN

## 2020-09-10 PROCEDURE — 72132 CT LUMBAR SPINE W/DYE: CPT

## 2020-09-10 PROCEDURE — 2709999900 IR MYELOGRAM LUMBOSACRAL

## 2020-09-10 PROCEDURE — 85610 PROTHROMBIN TIME: CPT

## 2020-09-10 PROCEDURE — 6360000004 HC RX CONTRAST MEDICATION: Performed by: RADIOLOGY

## 2020-09-10 RX ORDER — SODIUM CHLORIDE 9 MG/ML
INJECTION, SOLUTION INTRAVENOUS CONTINUOUS
Status: DISCONTINUED | OUTPATIENT
Start: 2020-09-10 | End: 2020-09-13 | Stop reason: HOSPADM

## 2020-09-10 RX ORDER — ACETAMINOPHEN 325 MG/1
650 TABLET ORAL EVERY 4 HOURS PRN
Status: DISCONTINUED | OUTPATIENT
Start: 2020-09-10 | End: 2020-09-13 | Stop reason: HOSPADM

## 2020-09-10 RX ORDER — SODIUM CHLORIDE 0.9 % (FLUSH) 0.9 %
10 SYRINGE (ML) INJECTION PRN
Status: DISCONTINUED | OUTPATIENT
Start: 2020-09-10 | End: 2020-09-13 | Stop reason: HOSPADM

## 2020-09-10 RX ADMIN — IOPAMIDOL 14 ML: 408 INJECTION, SOLUTION INTRATHECAL at 14:08

## 2020-09-10 ASSESSMENT — PAIN SCALES - GENERAL
PAINLEVEL_OUTOF10: 0
PAINLEVEL_OUTOF10: 0

## 2020-09-10 NOTE — BRIEF OP NOTE
Brief Postoperative Note lumbar Myelogram     Sondra Mendez  YOB: 1950  [id number]    Pre-operative Diagnosis: lumbar pain    Post-operative Diagnosis: Same    Procedure: Fluoro guided Myelogram    Anesthesia: 1% Lidocaine    Surgeons/Assistants: Praneeth Patrick MD    Complications: None    Specimens: were not obtained    Fluoro guided lumbar myelogram with 22 gauge spinal needle performed successfully. 14 ml 200 Isovue contrast injected. CT to follow. Dressing applied. Vital signs were reviewed and were stable after the procedure.       Electronically signed by Praneeth Patrick on 9/10/2020 at 2:34 PM

## 2020-09-10 NOTE — H&P
History and Physical Update    Pt Name: Marilou Landry  MRN: [id number]  YOB: 1950  Date of evaluation: 9/10/2020      [x] I have reviewed the [address] NOTE OF 9/8/2020   By [facility]  which meets the criteria for an Interval History and Physical note COPIED BELOW. .    [x] I have examined  Marilou Landry  There are no changes to the patient who is scheduled for a LUMBAR MYELOGRAM .   The patient denies health changes, fever, chills, productive cough, SOB, chest pain, open sores or wounds. SHE HAS A GREAT DEAL OF TROUBLE MOVING , EVEN IN THE BED. BMI IS 47. SHE DOES NOT TAKE BLOOD THINNERS, SHE DOES NOT HAVE DIABETES. Vital signs: Ht 5' 7\" (1.702 m)   Wt (!) 302 lb (137 kg)   BMI 47.30 kg/m²     Allergies:  Aspirin; Baclofen; and Wellbutrin [bupropion]    Medications:    Prior to Admission medications    Medication Sig Start Date End Date Taking? Authorizing Provider   HYDROcodone-acetaminophen (NORCO) 5-325 MG per tablet Take 1 tablet by mouth every 8 hours as needed for Pain for up to 3 days. Intended supply: 3 days.  Take lowest dose possible to manage pain 9/8/20 9/11/20 Yes Candace Garcia MD   amLODIPine (NORVASC) 10 MG tablet Take 1 tablet by mouth daily 8/18/20  Yes Candace Garcia MD   levothyroxine (SYNTHROID) 75 MCG tablet Take 1 tablet by mouth Daily 8/18/20  Yes Candace Garcia MD   liothyronine (CYTOMEL) 5 MCG tablet Take 1 tablet by mouth daily 8/18/20  Yes Candace Garcia MD   omeprazole 20 MG EC tablet Take 1 tablet by mouth daily 8/18/20  Yes Candace Garcia MD   venlafaxine (EFFEXOR XR) 150 MG extended release capsule TAKE ONE CAPSULE BY MOUTH DAILY 8/18/20  Yes Candace Garcia MD   ibuprofen-famotidine (DUEXIS) 800-26.6 MG TABS Take 1 tablet by mouth 3 times daily as needed (pain) 4/18/19  Yes Candace Garcia MD   Glucosamine-Chondroit-Vit C-Mn (GLUCOSAMINE CHONDROITIN COMPLX) CAPS Take 1 tablet by mouth 2 times daily 11/26/18  Yes Candace Garcia MD   Cholecalciferol (VITAMIN D) 2000 UNITS CAPS capsule Take by mouth daily   Yes Historical Provider, MD   aspirin 81 MG tablet Take 81 mg by mouth daily. Yes Historical Provider, MD   senna-docusate (STOOL SOFTENER & LAXATIVE) 8.6-50 MG per tablet Take 1 tablet by mouth daily. Yes Historical Provider, MD   diclofenac sodium (VOLTAREN) 1 % GEL Apply 2 g topically 2 times daily 20   Mark Anthony Hightower MD   naproxen-esomeprazole (VIMOVO) 500-20 MG TBEC Take 1 capsule by mouth 2 times daily as needed (pain) 20   Mark Anthony Hightower MD         This is a 69 y.o. female who is pleasant, cooperative, alert and oriented x3, in no acute distress. Heart: Heart sounds are normal.  HR regular rate and rhythm without murmur, gallop or rub. Lungs: Normal respiratory effort with equal expansion, good air exchange, unlabored and clear to auscultation without wheezes or rales bilaterally   Abdomen: soft, nontender, nondistended with bowel sounds AUDIBLE  X 4   PEDAL PULSES + 2 BILATERALLY NO CALF TENDERNESS NO EDEMA. Labs:  No results for input(s): HGB, HCT, WBC, MCV, PLT, NA, K, CL, CO2, BUN, CREATININE, GLUCOSE, INR, PROTIME, APTT, AST, ALT, LABALBU, HCG in the last 720 hours. No results for input(s): COVID19 in the last 720 hours. MIR Quintanilla  Electronically signed 9/10/2020 at 12:30 PM              Related encounter: Office Visit from 2020 in [employer].          Signed        Expand All Collapse All     Show:Clear all  [x]Manual[x]Template[]Copied    Added by:  Ellyn Forman MD    [][place] for details                   Medicare Annual Wellness Visit  Name: Amanda Quinones Date: 9/10/2020   MRN: [id number] Sex: Female   Age: 69 y.o.  Ethnicity: Non-/Non    : 1950 Race: Carolyne Villeda is here for Medicare AWV    Screenings for behavioral, psychosocial and functional/safety risks, and cognitive dysfunction are all Diagnosis Date    Alkaline phosphatase elevation 06/09/2016     stable last 2 check    Bronchitis 10/18/2016    Exercise counseling 3/10/2016    Frequent urination 3/7/2019    Headache(784.0)      Hypertension      Leukocytosis 4/11/2016    Obesity      [address] [address]      Viral upper respiratory tract infection 6/9/2016           Past Surgical History[]Expand by Default         Past Surgical History:   Procedure Laterality Date    BRAIN SURGERY         for Cerebral aneurysm        Family History[]Expand by Default              Family History   Problem Relation Age of Onset    High Blood Pressure Mother      Diabetes Mother      Alcohol Abuse Father      High Blood Pressure Sister      Diabetes Sister      Cancer Sister           leukemia    Alcohol Abuse Sister      Thyroid Disease Other      Breast Cancer Sister           aunt     Cancer Maternal Aunt           breast          CareTeam (Including outside providers/suppliers regularly involved in providing care):   Patient Care Team:  Dannielle Hill MD as PCP - General (Family Medicine)  Dannielle Hill MD as PCP - [facility] Empaneled Provider  Rebecca Monreal RN as Nurse Navigator         Wt Readings from Last 3 Encounters:   09/08/20 (!) 302 lb (137 kg)   08/27/20 (!) 302 lb (137 kg)   08/18/20 (!) 302 lb (137 kg)     Vitals       Vitals:     09/08/20 1418   BP: (!) 148/94   Site: Left Lower Arm   Position: Sitting   Pulse: 88   Temp: 96.7 °F (35.9 °C)   Weight: (!) 302 lb (137 kg)   Height: 5' 7\" (1.702 m)        Body mass index is 47.3 kg/m². Based upon direct observation of the patient, evaluation of cognition reveals recent and remote memory intact.      General Appearance: alert and oriented to person, place and time, well developed and well- nourished, in no acute distress  Skin: warm and dry, no rash or erythema  Head: normocephalic and atraumatic  Eyes: pupils equal, round, and reactive to light, extraocular eye movements intact, conjunctivae normal  ENT: tympanic membrane, external ear and ear canal normal bilaterally, nose without deformity, nasal mucosa and turbinates normal without polyps  Neck: supple and non-tender without mass, no thyromegaly or thyroid nodules, no cervical lymphadenopathy  Pulmonary/Chest: clear to auscultation bilaterally- no wheezes, rales or rhonchi, normal air movement, no respiratory distress  Cardiovascular: normal rate, regular rhythm, normal S1 and S2, no murmurs, rubs, clicks, or gallops, distal pulses intact, no carotid bruits  Abdomen: soft, non-tender, non-distended, normal bowel sounds, no masses or organomegaly  Extremities: no cyanosis, clubbing or edema  Musculoskeletal: severe back pain, limited range of motion (following with Ortho) Neurologic: reflexes normal and symmetric, no cranial nerve deficit, gait, coordination and speech normal     Patient's complete Health Risk Assessment and screening values have been reviewed and are found in Flowsheets. The following problems were reviewed today and where indicated follow up appointments were made and/or referrals ordered. Positive Risk Factor Screenings with Interventions:     Substance History:      Social History             Tobacco History             Smoking Status  Current Every Day Smoker Smoking Frequency  1 pack/day for 50 years (48 pk yrs) Smoking Tobacco Type  Cigarettes           Smokeless Tobacco Use  Never Used                  Alcohol History           Alcohol Use Status  No                  Drug Use           Drug Use Status  No                  Sexual Activity           Sexually Active  Not Currently                  Alcohol Screening:        A score of 8 or more is associated with harmful or hazardous drinking. A score of 13 or more in women, and 15 or more in men, is likely to indicate alcohol dependence.   Substance Abuse Interventions:  · Tobacco abuse:  tobacco cessation tips and resources provided    Health Habits/Nutrition: Body mass index is 47.3 kg/m². Health Habits/Nutrition Interventions:  · Inadequate physical activity:  patient agrees to exercise for at least 150 minutes/week     Personalized Preventive Plan   Current Health Maintenance Status       Immunization History   Administered Date(s) Administered    Pneumococcal Conjugate 13-valent ([place]) 10/01/2008    Zoster Live (Zostavax) 04/20/2016              Health Maintenance   Topic Date Due    Pneumococcal 65+ years Vaccine (2 of 2 - PPSV23) 09/10/2014    Shingles Vaccine (2 of 3) 06/15/2016    Annual Wellness Visit (AWV)  05/29/2019    Breast cancer screen  12/05/2019    DTaP/Tdap/Td vaccine (1 - Tdap) 09/08/2021 (Originally 9/10/1969)    Flu vaccine (1) 09/08/2021 (Originally 9/1/2020)    A1C test (Diabetic or Prediabetic)  11/26/2020    TSH testing  11/26/2020    Potassium monitoring  11/26/2020    Creatinine monitoring  11/26/2020    Low dose CT lung screening  12/11/2020    Lipid screen  11/26/2023    Colon cancer screen colonoscopy  06/27/2029    Hepatitis C screen  Completed    DEXA (modify frequency per FRAX score)  Addressed    Hepatitis A vaccine  Aged Out    Hepatitis B vaccine  Aged Out    Hib vaccine  Aged Out    Meningococcal (ACWY) vaccine  Aged Out     Recommendations for [employer] Due: see orders and patient instructions/AVS.  . Recommended screening schedule for the next 5-10 years is provided to the patient in written form: see Patient Rolanda Meckel was seen today for medicare awv. Diagnoses and all orders for this visit:     Medicare annual wellness visit, subsequent     Encounter for screening mammogram for breast cancer  -     JORDAN DIGITAL SCREEN W OR WO CAD BILATERAL; Future     Need for shingles vaccine  -     Zoster recombinant [facility]); Future     Need for pneumococcal vaccination  -     PNEUMOVAX 23 subcutaneous/IM (Pneumococcal polysaccharide vaccine 23-valent >= 1yo);  Future     Essential hypertension  -     Comprehensive Metabolic Panel, Fasting; Future     Fatigue, unspecified type  -     CBC Auto Differential; Future     Dyslipidemia  -     Lipid, Fasting; Future     Hemoglobin A1c above reference range  -     Hemoglobin A1C; Future     DDD (degenerative disc disease), lumbar  -     diclofenac sodium (VOLTAREN) 1 % GEL; Apply 2 g topically 2 times daily  -     naproxen-esomeprazole (VIMOVO) 500-20 MG TBEC; Take 1 capsule by mouth 2 times daily as needed (pain)  -     HYDROcodone-acetaminophen (NORCO) 5-325 MG per tablet; Take 1 tablet by mouth every 8 hours as needed for Pain for up to 3 days. Intended supply: 3 days. Take lowest dose possible to manage pain     Routine general medical examination at a health care facility                       Advance Care Planning   Advanced Care Planning: Discussed the patients choices for care and treatment in case of a health event that adversely affects decision-making abilities. Also discussed the patients long-term treatment options. Reviewed with the patient the [address] [place] [address] Declaration forms  Reviewed the process of designating a competent adult as an Agent (or -in-fact) that could take make health care decisions for the patient if incompetent. Patient was asked to complete the declaration forms, either acknowledge the forms by a public notary or an eligible witness and provide a signed copy to the practice office. Time spent (minutes): 5     Obesity Counseling: Assessed behavioral health risks and factors affecting choice of behavior. Suggested weight control approaches, including dietary changes behavioral modification and follow up plan. Provided educational and support documentation. Time spent (minutes): 5     Cardiovascular Disease Risk Counseling: Assessed the patient's risk to develop cardiovascular disease and reviewed main risk factors.          Reviewed steps to reduce disease risk including: · Quitting tobacco use, reducing amount smoked, or not starting the habit  · Making healthy food choices  · Being physically active and gradualy increasing activity levels   · Reduce weight and determine a healthy BMI goal  · Monitor blood pressure and treat if higher than 140/90 mmHg  · Maintain blood total cholesterol levels under 5 mmol/l or 190 mg/dl  · Maintain LDL cholesterol levels under 3.0 mmol/l or 115 mg/dl   · Control blood glucose levels  · Consider taking aspirin (75 mg daily), once blood pressure is controlled   Provided a follow up plan.   Time spent (minutes): 5

## 2020-09-10 NOTE — FLOWSHEET NOTE
Pt tolerated procedure without distress. Dressing applied to procedure site.  Pt taken to ct for further images will go to pacu after ct scan

## 2020-09-22 ENCOUNTER — OFFICE VISIT (OUTPATIENT)
Dept: ORTHOPEDIC SURGERY | Age: 70
End: 2020-09-22
Payer: MEDICARE

## 2020-09-22 PROCEDURE — 4004F PT TOBACCO SCREEN RCVD TLK: CPT | Performed by: ORTHOPAEDIC SURGERY

## 2020-09-22 PROCEDURE — 3017F COLORECTAL CA SCREEN DOC REV: CPT | Performed by: ORTHOPAEDIC SURGERY

## 2020-09-22 PROCEDURE — 4040F PNEUMOC VAC/ADMIN/RCVD: CPT | Performed by: ORTHOPAEDIC SURGERY

## 2020-09-22 PROCEDURE — G8427 DOCREV CUR MEDS BY ELIG CLIN: HCPCS | Performed by: ORTHOPAEDIC SURGERY

## 2020-09-22 PROCEDURE — 1090F PRES/ABSN URINE INCON ASSESS: CPT | Performed by: ORTHOPAEDIC SURGERY

## 2020-09-22 PROCEDURE — G8417 CALC BMI ABV UP PARAM F/U: HCPCS | Performed by: ORTHOPAEDIC SURGERY

## 2020-09-22 PROCEDURE — 99213 OFFICE O/P EST LOW 20 MIN: CPT | Performed by: ORTHOPAEDIC SURGERY

## 2020-09-22 PROCEDURE — G8399 PT W/DXA RESULTS DOCUMENT: HCPCS | Performed by: ORTHOPAEDIC SURGERY

## 2020-09-22 PROCEDURE — 1123F ACP DISCUSS/DSCN MKR DOCD: CPT | Performed by: ORTHOPAEDIC SURGERY

## 2020-09-22 NOTE — PROGRESS NOTES
Patient ID: Marcy Grossman is a 69 y.o. female. Chief Complaint   Patient presents with    Follow-up     ct myelogram         HPI     Please refer to my previous clinic notes    Patient is here for follow-up of her low back pain and her bilateral left greater than right knee pain. Patient's sister with history of bilateral knee replacements with ongoing significant bilateral knee pain    Past Medical History:   Diagnosis Date    Alkaline phosphatase elevation 06/09/2016    stable last 2 check    Bronchitis 10/18/2016    Exercise counseling 3/10/2016    Frequent urination 3/7/2019    Headache(784.0)     Hypertension     Leukocytosis 4/11/2016    Obesity     [address] [address] [address]     Viral upper respiratory tract infection 6/9/2016     Past Surgical History:   Procedure Laterality Date    BRAIN SURGERY      for Cerebral aneurysm     Family History   Problem Relation Age of Onset    High Blood Pressure Mother     Diabetes Mother     Alcohol Abuse Father     High Blood Pressure Sister     Diabetes Sister     Cancer Sister         leukemia    Alcohol Abuse Sister     Thyroid Disease Other     Breast Cancer Sister         aunt     Cancer Maternal Aunt         breast     Social History     Occupational History    Not on file   Tobacco Use    Smoking status: Current Every Day Smoker     Packs/day: 1.00     Years: 50.00     Pack years: 50.00     Types: Cigarettes    Smokeless tobacco: Never Used   Substance and Sexual Activity    Alcohol use: No    Drug use: No    Sexual activity: Not Currently        Review of Systems         Physical Exam  Vitals signs and nursing note reviewed. Constitutional:       Appearance: She is well-developed. HENT:      Head: Normocephalic and atraumatic. Nose: Nose normal.   Eyes:      Conjunctiva/sclera: Conjunctivae normal.   Neck:      Musculoskeletal: Normal range of motion and neck supple.    Pulmonary:      Effort: Pulmonary effort is normal. No

## 2020-10-10 PROBLEM — Z00.00 ROUTINE GENERAL MEDICAL EXAMINATION AT A HEALTH CARE FACILITY: Status: RESOLVED | Noted: 2020-09-10 | Resolved: 2020-10-10

## 2020-11-03 PROBLEM — M47.816 OSTEOARTHRITIS OF LUMBAR SPINE: Status: RESOLVED | Noted: 2017-08-28 | Resolved: 2020-11-03

## 2020-12-10 ENCOUNTER — HOSPITAL ENCOUNTER (OUTPATIENT)
Dept: MAMMOGRAPHY | Age: 70
Discharge: HOME | End: 2020-12-12
Payer: MEDICARE

## 2020-12-10 ENCOUNTER — HOSPITAL ENCOUNTER (OUTPATIENT)
Age: 70
Discharge: HOME | End: 2020-12-10
Payer: MEDICARE

## 2020-12-10 LAB
ABSOLUTE EOS #: 0.37 K/UL (ref 0–0.44)
ABSOLUTE IMMATURE GRANULOCYTE: 0.07 K/UL (ref 0–0.3)
ABSOLUTE LYMPH #: 3.18 K/UL (ref 1.1–3.7)
ABSOLUTE MONO #: 1.14 K/UL (ref 0.1–1.2)
ALBUMIN SERPL-MCNC: 3.9 G/DL (ref 3.5–5.2)
ALBUMIN/GLOBULIN RATIO: 0.8 (ref 1–2.5)
ALP BLD-CCNC: 113 U/L (ref 35–104)
ALT SERPL-CCNC: 8 U/L (ref 5–33)
ANION GAP SERPL CALCULATED.3IONS-SCNC: 16 MMOL/L (ref 9–17)
AST SERPL-CCNC: 14 U/L
BASOPHILS # BLD: 1 % (ref 0–2)
BASOPHILS ABSOLUTE: 0.1 K/UL (ref 0–0.2)
BILIRUB SERPL-MCNC: 0.28 MG/DL (ref 0.3–1.2)
BUN BLDV-MCNC: 13 MG/DL (ref 8–23)
BUN/CREAT BLD: ABNORMAL (ref 9–20)
CALCIUM SERPL-MCNC: 10 MG/DL (ref 8.6–10.4)
CHLORIDE BLD-SCNC: 102 MMOL/L (ref 98–107)
CHOLESTEROL, FASTING: 170 MG/DL
CHOLESTEROL/HDL RATIO: 3.7
CO2: 22 MMOL/L (ref 20–31)
CREAT SERPL-MCNC: 1.13 MG/DL (ref 0.5–0.9)
DIFFERENTIAL TYPE: ABNORMAL
EOSINOPHILS RELATIVE PERCENT: 3 % (ref 1–4)
ESTIMATED AVERAGE GLUCOSE: 126 MG/DL
GFR AFRICAN AMERICAN: 58 ML/MIN
GFR NON-AFRICAN AMERICAN: 47 ML/MIN
GFR SERPL CREATININE-BSD FRML MDRD: ABNORMAL ML/MIN/{1.73_M2}
GFR SERPL CREATININE-BSD FRML MDRD: ABNORMAL ML/MIN/{1.73_M2}
GLUCOSE FASTING: 117 MG/DL (ref 70–99)
HBA1C MFR BLD: 6 % (ref 4–6)
HCT VFR BLD CALC: 46.9 % (ref 36.3–47.1)
HDLC SERPL-MCNC: 46 MG/DL
HEMOGLOBIN: 13.4 G/DL (ref 11.9–15.1)
IMMATURE GRANULOCYTES: 1 %
LDL CHOLESTEROL: 100 MG/DL (ref 0–130)
LYMPHOCYTES # BLD: 24 % (ref 24–43)
MCH RBC QN AUTO: 25.4 PG (ref 25.2–33.5)
MCHC RBC AUTO-ENTMCNC: 28.6 G/DL (ref 28.4–34.8)
MCV RBC AUTO: 88.8 FL (ref 82.6–102.9)
MONOCYTES # BLD: 9 % (ref 3–12)
NRBC AUTOMATED: 0 PER 100 WBC
PDW BLD-RTO: 17.9 % (ref 11.8–14.4)
PLATELET # BLD: 565 K/UL (ref 138–453)
PLATELET ESTIMATE: ABNORMAL
PMV BLD AUTO: 9.4 FL (ref 8.1–13.5)
POTASSIUM SERPL-SCNC: 4.5 MMOL/L (ref 3.7–5.3)
RBC # BLD: 5.28 M/UL (ref 3.95–5.11)
RBC # BLD: ABNORMAL 10*6/UL
SEG NEUTROPHILS: 62 % (ref 36–65)
SEGMENTED NEUTROPHILS ABSOLUTE COUNT: 8.19 K/UL (ref 1.5–8.1)
SODIUM BLD-SCNC: 140 MMOL/L (ref 135–144)
TOTAL PROTEIN: 8.7 G/DL (ref 6.4–8.3)
TRIGLYCERIDE, FASTING: 121 MG/DL
VLDLC SERPL CALC-MCNC: NORMAL MG/DL (ref 1–30)
WBC # BLD: 13.1 K/UL (ref 3.5–11.3)
WBC # BLD: ABNORMAL 10*3/UL

## 2020-12-10 PROCEDURE — 85025 COMPLETE CBC W/AUTO DIFF WBC: CPT

## 2020-12-10 PROCEDURE — 77063 BREAST TOMOSYNTHESIS BI: CPT

## 2020-12-10 PROCEDURE — 83036 HEMOGLOBIN GLYCOSYLATED A1C: CPT

## 2020-12-10 PROCEDURE — 36415 COLL VENOUS BLD VENIPUNCTURE: CPT

## 2020-12-10 PROCEDURE — 80053 COMPREHEN METABOLIC PANEL: CPT

## 2020-12-10 PROCEDURE — 80061 LIPID PANEL: CPT

## 2021-04-01 ENCOUNTER — HOSPITAL ENCOUNTER (OUTPATIENT)
Age: 71
Discharge: HOME | End: 2021-04-01
Payer: MEDICARE

## 2021-04-01 DIAGNOSIS — D72.829 LEUKOCYTOSIS, UNSPECIFIED TYPE: ICD-10-CM

## 2021-04-01 LAB
ABSOLUTE EOS #: 0.43 K/UL (ref 0–0.44)
ABSOLUTE IMMATURE GRANULOCYTE: 0.07 K/UL (ref 0–0.3)
ABSOLUTE LYMPH #: 3.58 K/UL (ref 1.1–3.7)
ABSOLUTE MONO #: 1.16 K/UL (ref 0.1–1.2)
ALBUMIN SERPL-MCNC: 4.1 G/DL (ref 3.5–5.2)
ALBUMIN/GLOBULIN RATIO: 0.9 (ref 1–2.5)
ALP BLD-CCNC: 107 U/L (ref 35–104)
ALT SERPL-CCNC: 9 U/L (ref 5–33)
ANION GAP SERPL CALCULATED.3IONS-SCNC: 7 MMOL/L (ref 9–17)
AST SERPL-CCNC: 14 U/L
BASOPHILS # BLD: 1 % (ref 0–2)
BASOPHILS ABSOLUTE: 0.07 K/UL (ref 0–0.2)
BILIRUB SERPL-MCNC: 0.25 MG/DL (ref 0.3–1.2)
BUN BLDV-MCNC: 10 MG/DL (ref 8–23)
BUN/CREAT BLD: ABNORMAL (ref 9–20)
CALCIUM SERPL-MCNC: 9.5 MG/DL (ref 8.6–10.4)
CHLORIDE BLD-SCNC: 101 MMOL/L (ref 98–107)
CO2: 29 MMOL/L (ref 20–31)
CREAT SERPL-MCNC: 0.89 MG/DL (ref 0.5–0.9)
DIFFERENTIAL TYPE: ABNORMAL
EOSINOPHILS RELATIVE PERCENT: 3 % (ref 1–4)
GFR AFRICAN AMERICAN: >60 ML/MIN
GFR NON-AFRICAN AMERICAN: >60 ML/MIN
GFR SERPL CREATININE-BSD FRML MDRD: ABNORMAL ML/MIN/{1.73_M2}
GFR SERPL CREATININE-BSD FRML MDRD: ABNORMAL ML/MIN/{1.73_M2}
GLUCOSE BLD-MCNC: 84 MG/DL (ref 70–99)
HCT VFR BLD CALC: 45.2 % (ref 36.3–47.1)
HEMOGLOBIN: 13.3 G/DL (ref 11.9–15.1)
IMMATURE GRANULOCYTES: 1 %
LYMPHOCYTES # BLD: 25 % (ref 24–43)
MCH RBC QN AUTO: 26 PG (ref 25.2–33.5)
MCHC RBC AUTO-ENTMCNC: 29.4 G/DL (ref 28.4–34.8)
MCV RBC AUTO: 88.5 FL (ref 82.6–102.9)
MONOCYTES # BLD: 8 % (ref 3–12)
NRBC AUTOMATED: 0 PER 100 WBC
PDW BLD-RTO: 16.4 % (ref 11.8–14.4)
PLATELET # BLD: 542 K/UL (ref 138–453)
PLATELET ESTIMATE: ABNORMAL
PMV BLD AUTO: 9.6 FL (ref 8.1–13.5)
POTASSIUM SERPL-SCNC: 4.3 MMOL/L (ref 3.7–5.3)
RBC # BLD: 5.11 M/UL (ref 3.95–5.11)
RBC # BLD: ABNORMAL 10*6/UL
SEG NEUTROPHILS: 62 % (ref 36–65)
SEGMENTED NEUTROPHILS ABSOLUTE COUNT: 8.94 K/UL (ref 1.5–8.1)
SODIUM BLD-SCNC: 137 MMOL/L (ref 135–144)
T3 FREE: 2.28 PG/ML (ref 2.02–4.43)
THYROXINE, FREE: 1.01 NG/DL (ref 0.93–1.7)
TOTAL PROTEIN: 8.7 G/DL (ref 6.4–8.3)
TSH SERPL DL<=0.05 MIU/L-ACNC: 1.4 MIU/L (ref 0.3–5)
WBC # BLD: 14.3 K/UL (ref 3.5–11.3)
WBC # BLD: ABNORMAL 10*3/UL

## 2021-04-01 PROCEDURE — 84439 ASSAY OF FREE THYROXINE: CPT

## 2021-04-01 PROCEDURE — 36415 COLL VENOUS BLD VENIPUNCTURE: CPT

## 2021-04-01 PROCEDURE — 84481 FREE ASSAY (FT-3): CPT

## 2021-04-01 PROCEDURE — 85025 COMPLETE CBC W/AUTO DIFF WBC: CPT

## 2021-04-01 PROCEDURE — 84443 ASSAY THYROID STIM HORMONE: CPT

## 2021-04-01 PROCEDURE — 80053 COMPREHEN METABOLIC PANEL: CPT

## 2021-04-05 ENCOUNTER — HOSPITAL ENCOUNTER (OUTPATIENT)
Age: 71
Setting detail: SPECIMEN
Discharge: HOME | End: 2021-04-05
Payer: MEDICARE

## 2021-04-05 PROCEDURE — 87086 URINE CULTURE/COLONY COUNT: CPT

## 2021-04-06 LAB
CULTURE: NORMAL
Lab: NORMAL
SPECIMEN DESCRIPTION: NORMAL

## 2021-04-12 ENCOUNTER — TELEPHONE (OUTPATIENT)
Dept: ONCOLOGY | Age: 71
End: 2021-04-12

## 2021-04-12 ENCOUNTER — HOSPITAL ENCOUNTER (OUTPATIENT)
Facility: MEDICAL CENTER | Age: 71
Discharge: HOME | End: 2021-04-12
Payer: MEDICARE

## 2021-04-12 ENCOUNTER — INITIAL CONSULT (OUTPATIENT)
Dept: ONCOLOGY | Age: 71
End: 2021-04-12
Payer: MEDICARE

## 2021-04-12 ENCOUNTER — HOSPITAL ENCOUNTER (OUTPATIENT)
Facility: MEDICAL CENTER | Age: 71
End: 2021-04-12
Payer: MEDICARE

## 2021-04-12 VITALS
SYSTOLIC BLOOD PRESSURE: 137 MMHG | RESPIRATION RATE: 18 BRPM | HEART RATE: 84 BPM | DIASTOLIC BLOOD PRESSURE: 95 MMHG | HEIGHT: 67 IN | TEMPERATURE: 98.5 F | BODY MASS INDEX: 44.57 KG/M2 | WEIGHT: 284 LBS

## 2021-04-12 DIAGNOSIS — D75.839 THROMBOCYTOSIS: ICD-10-CM

## 2021-04-12 DIAGNOSIS — M54.50 LUMBAR BACK PAIN: ICD-10-CM

## 2021-04-12 DIAGNOSIS — D72.823 LEUKEMOID REACTION: Primary | ICD-10-CM

## 2021-04-12 DIAGNOSIS — D72.823 LEUKEMOID REACTION: ICD-10-CM

## 2021-04-12 LAB
ABSOLUTE EOS #: 0.37 K/UL (ref 0–0.44)
ABSOLUTE IMMATURE GRANULOCYTE: 0.08 K/UL (ref 0–0.3)
ABSOLUTE LYMPH #: 2.5 K/UL (ref 1.1–3.7)
ABSOLUTE MONO #: 0.91 K/UL (ref 0.1–1.2)
ABSOLUTE RETIC #: 0.07 M/UL (ref 0.03–0.08)
BASOPHILS # BLD: 1 % (ref 0–2)
BASOPHILS ABSOLUTE: 0.1 K/UL (ref 0–0.2)
DIFFERENTIAL TYPE: ABNORMAL
EOSINOPHILS RELATIVE PERCENT: 3 % (ref 1–4)
FREE KAPPA/LAMBDA RATIO: 1.07 (ref 0.26–1.65)
HCT VFR BLD CALC: 46.2 % (ref 36.3–47.1)
HEMOGLOBIN: 13.4 G/DL (ref 11.9–15.1)
IMMATURE GRANULOCYTES: 1 %
IMMATURE RETIC FRACT: 14.3 % (ref 2.7–18.3)
KAPPA FREE LIGHT CHAINS QNT: 4.11 MG/DL (ref 0.37–1.94)
LAMBDA FREE LIGHT CHAINS QNT: 3.83 MG/DL (ref 0.57–2.63)
LYMPHOCYTES # BLD: 18 % (ref 24–43)
MCH RBC QN AUTO: 25.6 PG (ref 25.2–33.5)
MCHC RBC AUTO-ENTMCNC: 29 G/DL (ref 28.4–34.8)
MCV RBC AUTO: 88.3 FL (ref 82.6–102.9)
MONOCYTES # BLD: 7 % (ref 3–12)
NRBC AUTOMATED: 0 PER 100 WBC
PDW BLD-RTO: 16.2 % (ref 11.8–14.4)
PLATELET # BLD: 472 K/UL (ref 138–453)
PLATELET ESTIMATE: ABNORMAL
PMV BLD AUTO: 9.3 FL (ref 8.1–13.5)
RBC # BLD: 5.23 M/UL (ref 3.95–5.11)
RBC # BLD: ABNORMAL 10*6/UL
RETIC %: 1.3 % (ref 0.5–1.9)
RETIC HEMOGLOBIN: 25.8 PG (ref 28.2–35.7)
SEDIMENTATION RATE, ERYTHROCYTE: 37 MM (ref 0–30)
SEG NEUTROPHILS: 70 % (ref 36–65)
SEGMENTED NEUTROPHILS ABSOLUTE COUNT: 9.89 K/UL (ref 1.5–8.1)
WBC # BLD: 13.9 K/UL (ref 3.5–11.3)
WBC # BLD: ABNORMAL 10*3/UL

## 2021-04-12 PROCEDURE — 36415 COLL VENOUS BLD VENIPUNCTURE: CPT

## 2021-04-12 PROCEDURE — 81206 BCR/ABL1 GENE MAJOR BP: CPT

## 2021-04-12 PROCEDURE — G8417 CALC BMI ABV UP PARAM F/U: HCPCS | Performed by: INTERNAL MEDICINE

## 2021-04-12 PROCEDURE — 1090F PRES/ABSN URINE INCON ASSESS: CPT | Performed by: INTERNAL MEDICINE

## 2021-04-12 PROCEDURE — 84155 ASSAY OF PROTEIN SERUM: CPT

## 2021-04-12 PROCEDURE — 86334 IMMUNOFIX E-PHORESIS SERUM: CPT

## 2021-04-12 PROCEDURE — 81270 JAK2 GENE: CPT

## 2021-04-12 PROCEDURE — 83883 ASSAY NEPHELOMETRY NOT SPEC: CPT

## 2021-04-12 PROCEDURE — 84165 PROTEIN E-PHORESIS SERUM: CPT

## 2021-04-12 PROCEDURE — 85045 AUTOMATED RETICULOCYTE COUNT: CPT

## 2021-04-12 PROCEDURE — G8427 DOCREV CUR MEDS BY ELIG CLIN: HCPCS | Performed by: INTERNAL MEDICINE

## 2021-04-12 PROCEDURE — 85652 RBC SED RATE AUTOMATED: CPT

## 2021-04-12 PROCEDURE — 99202 OFFICE O/P NEW SF 15 MIN: CPT

## 2021-04-12 PROCEDURE — 99204 OFFICE O/P NEW MOD 45 MIN: CPT | Performed by: INTERNAL MEDICINE

## 2021-04-12 PROCEDURE — 82607 VITAMIN B-12: CPT

## 2021-04-12 PROCEDURE — 85025 COMPLETE CBC W/AUTO DIFF WBC: CPT

## 2021-04-12 PROCEDURE — 82746 ASSAY OF FOLIC ACID SERUM: CPT

## 2021-04-12 NOTE — PROGRESS NOTES
DIAGNOSIS:   1. Leukocytosis, thrombocytosis, likely leukemoid reaction    CURRENT THERAPY:  Work-up in progress  BRIEF CASE HISTORY:   Sameer Perales is a very pleasant 69 y.o. female who is referred to us for leukocytosis. She has history of spinal damage and back pain, she has had several procedures. She has history of hypertension and hypothyroidism. Her sister and aunt had breast, sister had leukemia. The patient has history of cysts but no malignancy, her last mammogram was 12/2020 with negative results. She smokes a pack every 3 days. Cancer screening, CT of the chest was done in 2019 and was essentially negative. Mammogram December/2020 that was negative. Last colonoscopy was also in 2019 and showed multiple adenomatous polyps but no cancer. PAST MEDICAL HISTORY: has a past medical history of Alkaline phosphatase elevation, Bronchitis, Exercise counseling, Frequent urination, Headache(784.0), Hypertension, Leukocytosis, Obesity, Stroke ([address] and Viral upper respiratory tract infection. PAST SURGICAL HISTORY: has a past surgical history that includes brain surgery. CURRENT MEDICATIONS:  has a current medication list which includes the following prescription(s): amlodipine, omeprazole, levothyroxine, liothyronine, venlafaxine, gabapentin, vitamin d, aspirin, senna-docusate, diclofenac sodium, naproxen-esomeprazole, ibuprofen-famotidine, and glucosamine chondroitin complx. ALLERGIES:  is allergic to aspirin; baclofen; and wellbutrin [bupropion]. FAMILY HISTORY: Sister and aunt: breast cancer; sister: leukemia     SOCIAL HISTORY:  reports that she has been smoking cigarettes. She has a 50.00 pack-year smoking history. She has never used smokeless tobacco. She reports that she does not drink alcohol or use drugs. REVIEW OF SYSTEMS:   General: No fever or night sweats. Weight is stable.   ENT: No double or blurred vision, no tinnitus or hearing problem, no dysphagia or sore throat Respiratory: No chest pain, no shortness of breath, no cough or hemoptysis. Cardiovascular: Denies chest pain, PND or orthopnea. No L E swelling or palpitations. Gastrointestinal: No nausea or vomiting, abdominal pain, diarrhea or constipation. Genitourinary: Denies dysuria, hematuria, frequency, urgency or incontinence. Neurological: Denies headaches, decreased LOC, no sensory or motor focal deficits. Musculoskeletal: No arthralgia or joint swelling. +chronic back pain  Skin: There are no rashes or bleeding. Psychiatric: No anxiety, no depression. Endocrine: No diabetes or thyroid disease. Hematologic: No bleeding, no adenopathy. PHYSICAL EXAM: Shows a well appearing 69y.o.-year-old female who is not in pain or distress. Vital Signs: Blood pressure (!) 137/95, pulse 84, temperature 98.5 °F (36.9 °C), temperature source Oral, resp. rate 18, height 5' 7\" (1.702 m), weight 284 lb (128.8 kg). HEENT: Normocephalic and atraumatic. Pupils are equal, round, reactive to light and accommodation. Extraocular muscles are intact. Neck: Showed no JVD, no carotid bruit . Lungs: Clear to auscultation bilaterally. Heart: Regular without any murmur. Abdomen: Soft, nontender. No hepatosplenomegaly. Extremities: Lower extremities show no edema, clubbing, or cyanosis. Breasts: Examination not done today.  Neuro exam: intact cranial nerves bilaterally no motor or sensory deficit, gait is normal. Lymphatic: no adenopathy appreciated in the supraclavicular, axillary, cervical or inguinal area    REVIEW OF LABORATORY DATA:   Lab Results   Component Value Date    WBC 14.3 (H) 04/01/2021    HGB 13.3 04/01/2021    HCT 45.2 04/01/2021    MCV 88.5 04/01/2021     (H) 04/01/2021       Chemistry        Component Value Date/Time     04/01/2021 1221    K 4.3 04/01/2021 1221     04/01/2021 1221    CO2 29 04/01/2021 1221    BUN 10 04/01/2021 1221    CREATININE 0.89 04/01/2021 1221        Component Value Date/Time CALCIUM 9.5 04/01/2021 1221    ALKPHOS 107 (H) 04/01/2021 1221    AST 14 04/01/2021 1221    ALT 9 04/01/2021 1221    BILITOT 0.25 (L) 04/01/2021 1221            REVIEW OF RADIOLOGICAL RESULTS:     IMPRESSION:   1. Leukocytosis  2. HX hypertensin and hypothyroidism   3. Smoking addiction - no plan to quit    PLAN:   1. We reviewed her current and historical lab work which shows persistent leukocytosis dating back to 2015 with occasional thrombocytosis. 2. We discussed her comorbidity including back pain, hypertension and hypothyroidism. 3. I counseled her on smoking cessation, she is not interested. 4. We will initiate a work-up for her leukocytosis, clinically I suspect leukemoid reaction but other possibilities are not excluded especially that she had leukocytosis for extended period of time  5. She is having severe back pain and per her wishes, I will refer her to neurosurgery for an evaluation  6. Return to review results.

## 2021-04-12 NOTE — TELEPHONE ENCOUNTER
LAURYN HERE FOR CONSULTATION  NEED LABS TODAY  REFERRED TO NEUROSURGERY  RV IN 3 WKS FOR DISCUSSION OF LABS  LAB ORDER GIVEN TO PT ON EXIT   LABS DONE STODAY  REFERRAL WAS CALLED TO NEUROSURGERY APPT IS ON 5/19/21 @ 10:30AM W/ DR Shanda Tellez MD VISIT 5/3/21 @ 1PM  AVS PRINTED W/ INSTRUCTIONS AND GIVEN TO PT ON EXIT

## 2021-04-12 NOTE — LETTER
Chacha Guzman MD    4/12/2021     Chava Vogel, 64 [facility] [id number]    Dear Clay Domínguez : Thank you for referring Clark Torres, 1950, to me for evaluation. Below are the relevant portions of my assessment and plan of care. DIAGNOSIS:   1. Leukocytosis, thrombocytosis, likely leukemoid reaction    CURRENT THERAPY:  Work-up in progress  BRIEF CASE HISTORY:   Clark Torres is a very pleasant 69 y.o. female who is referred to us for leukocytosis. She has history of spinal damage and back pain, she has had several procedures. She has history of hypertension and hypothyroidism. Her sister and aunt had breast, sister had leukemia. The patient has history of cysts but no malignancy, her last mammogram was 12/2020 with negative results. She smokes a pack every 3 days. Cancer screening, CT of the chest was done in 2019 and was essentially negative. Mammogram December/2020 that was negative. Last colonoscopy was also in 2019 and showed multiple adenomatous polyps but no cancer. PAST MEDICAL HISTORY: has a past medical history of Alkaline phosphatase elevation, Bronchitis, Exercise counseling, Frequent urination, Headache(784.0), Hypertension, Leukocytosis, Obesity, Stroke ([address] and Viral upper respiratory tract infection. PAST SURGICAL HISTORY: has a past surgical history that includes brain surgery. CURRENT MEDICATIONS:  has a current medication list which includes the following prescription(s): amlodipine, omeprazole, levothyroxine, liothyronine, venlafaxine, gabapentin, vitamin d, aspirin, senna-docusate, diclofenac sodium, naproxen-esomeprazole, ibuprofen-famotidine, and glucosamine chondroitin complx. ALLERGIES:  is allergic to aspirin; baclofen; and wellbutrin [bupropion]. FAMILY HISTORY: Sister and aunt: breast cancer; sister: leukemia     SOCIAL HISTORY:  reports that she has been smoking cigarettes.  She has a 50.00 pack-year smoking history. She has never used smokeless tobacco. She reports that she does not drink alcohol or use drugs. REVIEW OF SYSTEMS:   General: No fever or night sweats. Weight is stable. ENT: No double or blurred vision, no tinnitus or hearing problem, no dysphagia or sore throat   Respiratory: No chest pain, no shortness of breath, no cough or hemoptysis. Cardiovascular: Denies chest pain, PND or orthopnea. No L E swelling or palpitations. Gastrointestinal: No nausea or vomiting, abdominal pain, diarrhea or constipation. Genitourinary: Denies dysuria, hematuria, frequency, urgency or incontinence. Neurological: Denies headaches, decreased LOC, no sensory or motor focal deficits. Musculoskeletal: No arthralgia or joint swelling. +chronic back pain  Skin: There are no rashes or bleeding. Psychiatric: No anxiety, no depression. Endocrine: No diabetes or thyroid disease. Hematologic: No bleeding, no adenopathy. PHYSICAL EXAM: Shows a well appearing 69y.o.-year-old female who is not in pain or distress. Vital Signs: Blood pressure (!) 137/95, pulse 84, temperature 98.5 °F (36.9 °C), temperature source Oral, resp. rate 18, height 5' 7\" (1.702 m), weight 284 lb (128.8 kg). HEENT: Normocephalic and atraumatic. Pupils are equal, round, reactive to light and accommodation. Extraocular muscles are intact. Neck: Showed no JVD, no carotid bruit . Lungs: Clear to auscultation bilaterally. Heart: Regular without any murmur. Abdomen: Soft, nontender. No hepatosplenomegaly. Extremities: Lower extremities show no edema, clubbing, or cyanosis. Breasts: Examination not done today.  Neuro exam: intact cranial nerves bilaterally no motor or sensory deficit, gait is normal. Lymphatic: no adenopathy appreciated in the supraclavicular, axillary, cervical or inguinal area    REVIEW OF LABORATORY DATA:   Lab Results   Component Value Date    WBC 14.3 (H) 04/01/2021    HGB 13.3 04/01/2021    HCT 45.2 04/01/2021 MCV 88.5 04/01/2021     (H) 04/01/2021       Chemistry        Component Value Date/Time     04/01/2021 1221    K 4.3 04/01/2021 1221     04/01/2021 1221    CO2 29 04/01/2021 1221    BUN 10 04/01/2021 1221    CREATININE 0.89 04/01/2021 1221        Component Value Date/Time    CALCIUM 9.5 04/01/2021 1221    ALKPHOS 107 (H) 04/01/2021 1221    AST 14 04/01/2021 1221    ALT 9 04/01/2021 1221    BILITOT 0.25 (L) 04/01/2021 1221            REVIEW OF RADIOLOGICAL RESULTS:     IMPRESSION:   1. Leukocytosis  2. HX hypertensin and hypothyroidism   3. Smoking addiction - no plan to quit    PLAN:   1. We reviewed her current and historical lab work which shows persistent leukocytosis dating back to 2015 with occasional thrombocytosis. 2. We discussed her comorbidity including back pain, hypertension and hypothyroidism. 3. I counseled her on smoking cessation, she is not interested. 4. We will initiate a work-up for her leukocytosis, clinically I suspect leukemoid reaction but other possibilities are not excluded especially that she had leukocytosis for extended period of time  5. She is having severe back pain and per her wishes, I will refer her to neurosurgery for an evaluation  6. Return to review results. If you have questions, please do not hesitate to call me. I look forward to following Yahaira John along with you.     Sincerely,    Leonarda Umanzor MD  Hematology/ Oncology  Cell [phone]

## 2021-04-13 LAB
FOLATE: 5.6 NG/ML
VITAMIN B-12: 232 PG/ML (ref 232–1245)

## 2021-04-14 LAB
ALBUMIN (CALCULATED): 4 G/DL (ref 3.2–5.2)
ALBUMIN PERCENT: 50 % (ref 45–65)
ALPHA 1 PERCENT: 3 % (ref 3–6)
ALPHA 2 PERCENT: 12 % (ref 6–13)
ALPHA-1-GLOBULIN: 0.2 G/DL (ref 0.1–0.4)
ALPHA-2-GLOBULIN: 1 G/DL (ref 0.5–0.9)
BETA GLOBULIN: 0.9 G/DL (ref 0.5–1.1)
BETA PERCENT: 11 % (ref 11–19)
GAMMA GLOBULIN %: 25 % (ref 9–20)
GAMMA GLOBULIN: 2 G/DL (ref 0.5–1.5)
PATHOLOGIST: ABNORMAL
PATHOLOGIST: NORMAL
PROTEIN ELECTROPHORESIS, SERUM: ABNORMAL
SERUM IFX INTERP: NORMAL
TOTAL PROT. SUM,%: 101 % (ref 98–102)
TOTAL PROT. SUM: 8.1 G/DL (ref 6.3–8.2)
TOTAL PROTEIN: 8.1 G/DL (ref 6.4–8.3)

## 2021-04-18 LAB
BCR-ABL QUANTITATIVE: NOT DETECTED
BCR-ABL1, PERCENT: 0 %
BCR/ABL SOURCE: NORMAL
EER BCR-ABL1, MAJOR: NORMAL
V617F MUTATION, QNT: 0 %

## 2021-04-26 ENCOUNTER — HOSPITAL ENCOUNTER (OUTPATIENT)
Facility: MEDICAL CENTER | Age: 71
End: 2021-04-26
Payer: MEDICARE

## 2021-05-03 ENCOUNTER — OFFICE VISIT (OUTPATIENT)
Dept: ONCOLOGY | Age: 71
End: 2021-05-03
Payer: MEDICARE

## 2021-05-03 ENCOUNTER — TELEPHONE (OUTPATIENT)
Dept: ONCOLOGY | Age: 71
End: 2021-05-03

## 2021-05-03 VITALS
RESPIRATION RATE: 18 BRPM | DIASTOLIC BLOOD PRESSURE: 75 MMHG | SYSTOLIC BLOOD PRESSURE: 119 MMHG | TEMPERATURE: 97.6 F | HEART RATE: 76 BPM

## 2021-05-03 DIAGNOSIS — D75.839 THROMBOCYTOSIS: ICD-10-CM

## 2021-05-03 DIAGNOSIS — D72.823 LEUKEMOID REACTION: Primary | ICD-10-CM

## 2021-05-03 DIAGNOSIS — R51.9 NONINTRACTABLE EPISODIC HEADACHE, UNSPECIFIED HEADACHE TYPE: ICD-10-CM

## 2021-05-03 DIAGNOSIS — D47.2 MGUS (MONOCLONAL GAMMOPATHY OF UNKNOWN SIGNIFICANCE): ICD-10-CM

## 2021-05-03 DIAGNOSIS — E53.8 LOW SERUM VITAMIN B12: ICD-10-CM

## 2021-05-03 PROCEDURE — G8417 CALC BMI ABV UP PARAM F/U: HCPCS | Performed by: INTERNAL MEDICINE

## 2021-05-03 PROCEDURE — 1123F ACP DISCUSS/DSCN MKR DOCD: CPT | Performed by: INTERNAL MEDICINE

## 2021-05-03 PROCEDURE — G8427 DOCREV CUR MEDS BY ELIG CLIN: HCPCS | Performed by: INTERNAL MEDICINE

## 2021-05-03 PROCEDURE — 99211 OFF/OP EST MAY X REQ PHY/QHP: CPT

## 2021-05-03 PROCEDURE — G8399 PT W/DXA RESULTS DOCUMENT: HCPCS | Performed by: INTERNAL MEDICINE

## 2021-05-03 PROCEDURE — 4004F PT TOBACCO SCREEN RCVD TLK: CPT | Performed by: INTERNAL MEDICINE

## 2021-05-03 PROCEDURE — 4040F PNEUMOC VAC/ADMIN/RCVD: CPT | Performed by: INTERNAL MEDICINE

## 2021-05-03 PROCEDURE — 99212 OFFICE O/P EST SF 10 MIN: CPT

## 2021-05-03 PROCEDURE — 1090F PRES/ABSN URINE INCON ASSESS: CPT | Performed by: INTERNAL MEDICINE

## 2021-05-03 PROCEDURE — 3017F COLORECTAL CA SCREEN DOC REV: CPT | Performed by: INTERNAL MEDICINE

## 2021-05-03 PROCEDURE — 99214 OFFICE O/P EST MOD 30 MIN: CPT | Performed by: INTERNAL MEDICINE

## 2021-05-03 RX ORDER — ACETAMINOPHEN 325 MG/1
325 TABLET ORAL
Status: CANCELLED | OUTPATIENT
Start: 2021-05-21

## 2021-05-03 RX ORDER — CYANOCOBALAMIN 1000 UG/ML
1000 INJECTION INTRAMUSCULAR; SUBCUTANEOUS ONCE
Status: CANCELLED
Start: 2021-05-21

## 2021-05-03 NOTE — PROGRESS NOTES
DIAGNOSIS:   1. Leukocytosis, thrombocytosis, likely leukemoid reaction  2. MGUS  3. Low B12    CURRENT THERAPY:  Monthly B12  Observation for MGUS   BRIEF CASE HISTORY:   Sujit Mathews is a very pleasant 69 y.o. female who is referred to us for leukocytosis. She has history of spinal damage and back pain, she has had several procedures. She has history of hypertension and hypothyroidism. Her sister and aunt had breast, sister had leukemia. The patient has history of cysts but no malignancy, her last mammogram was 12/2020 with negative results. She smokes a pack every 3 days. Cancer screening, CT of the chest was done in 2019 and was essentially negative. Mammogram December/2020 that was negative. Last colonoscopy was also in 2019 and showed multiple adenomatous polyps but no cancer. Work-up for the patient showed negative JAK2 mutation and negative BCR ABL, she had small MGUS under 1 g. She had normal kidney function and normal hemoglobin and no hypercalcemia. We decided to observe. INTERIM HISTORY: The patient presents for follow up today to review work up. She reports her back pain persists unchanged, she takes Gabapentin but reports it is not effective and has consult with pain management schedule for 05/19/2021. She has brain shunt and complains of pain in the area. PAST MEDICAL HISTORY: has a past medical history of Alkaline phosphatase elevation, Bronchitis, Exercise counseling, Frequent urination, Headache(784.0), Hypertension, Leukocytosis, Obesity, Stroke ([address] and Viral upper respiratory tract infection. PAST SURGICAL HISTORY: has a past surgical history that includes brain surgery. CURRENT MEDICATIONS:  has a current medication list which includes the following prescription(s): amlodipine, omeprazole, levothyroxine, liothyronine, venlafaxine, gabapentin, vitamin d, aspirin, and senna-docusate.     ALLERGIES:  is allergic to aspirin; baclofen; and wellbutrin [bupropion]. FAMILY HISTORY: Sister and aunt: breast cancer; sister: leukemia     SOCIAL HISTORY:  reports that she has been smoking cigarettes. She has a 50.00 pack-year smoking history. She has never used smokeless tobacco. She reports that she does not drink alcohol or use drugs. REVIEW OF SYSTEMS:   General: No fever or night sweats. Weight is stable. ENT: No double or blurred vision, no tinnitus or hearing problem, no dysphagia or sore throat   Respiratory: No chest pain, no shortness of breath, no cough or hemoptysis. Cardiovascular: Denies chest pain, PND or orthopnea. No L E swelling or palpitations. Gastrointestinal: No nausea or vomiting, abdominal pain, diarrhea or constipation. Genitourinary: Denies dysuria, hematuria, frequency, urgency or incontinence. Neurological: Denies headaches, decreased LOC, no sensory or motor focal deficits. +pain around brain shunt  Musculoskeletal: No arthralgia or joint swelling. +chronic back pain  Skin: There are no rashes or bleeding. Psychiatric: No anxiety, no depression. Endocrine: No diabetes or thyroid disease. Hematologic: No bleeding, no adenopathy. PHYSICAL EXAM: Shows a well appearing 69y.o.-year-old female who is not in pain or distress. Vital Signs: Blood pressure 119/75, pulse 76, temperature 97.6 °F (36.4 °C), temperature source Temporal, resp. rate 18. HEENT: Normocephalic and atraumatic. Pupils are equal, round, reactive to light and accommodation. Extraocular muscles are intact. Neck: Showed no JVD, no carotid bruit . Lungs: Clear to auscultation bilaterally. Heart: Regular without any murmur. Abdomen: Soft, nontender. No hepatosplenomegaly. Extremities: Lower extremities show no edema, clubbing, or cyanosis. Breasts: Examination not done today.  Neuro exam: intact cranial nerves bilaterally no motor or sensory deficit, gait is normal. Lymphatic: no adenopathy appreciated in the supraclavicular, axillary, cervical or inguinal

## 2021-05-03 NOTE — PATIENT INSTRUCTIONS
Start B12 injections once approved  Need monthly doses  rv in 6 months, need cbc, cmp spep and light chains prior to RV   Need skeletal survey prior to RV

## 2021-05-03 NOTE — TELEPHONE ENCOUNTER
LAURYN ARRIVES VIA WHEELCHAIR FOR MD VISIT  DR Hafsa Isaacs IN TO SEE PATIENT  ORDERS RECEIVED  START B12 INJECTIONS ONCE APPROVED  NEED MONTHLY DOSES  RV 6 MONTHS NEED CBC CMP SPEP & LIGHT CHAINS PRIOR TO RV  NEED SKELETAL SURVEY & CT PRIOR TO RV  VITAMIN B12 INJECTION 5/10/21 @1:30PM  LABS CDP CMP SPEP KAPPA LAMBDA FREE LIGHT CHAINS TO BE DONE 10/29/21,ORDERS GIVEN TO PATIENT  CT CHEST WO CONTRAST TO BE SCHEDULED BY PT, FORM GIVEN  XR BONE SURVEY TO BE DONE WITH CT  MD VISIT 11/5/21 @1PM  AVS PRINTED AND GIVEN TO PATIENT WITH INSTRUCTIONS  PATIENT DISCHARGED VIA WHEELCHAIR

## 2021-05-14 ENCOUNTER — HOSPITAL ENCOUNTER (OUTPATIENT)
Dept: CT IMAGING | Age: 71
Discharge: HOME | End: 2021-05-16
Payer: MEDICARE

## 2021-05-14 DIAGNOSIS — R51.9 NONINTRACTABLE EPISODIC HEADACHE, UNSPECIFIED HEADACHE TYPE: ICD-10-CM

## 2021-05-14 PROCEDURE — 70450 CT HEAD/BRAIN W/O DYE: CPT

## 2021-05-19 ENCOUNTER — OFFICE VISIT (OUTPATIENT)
Dept: NEUROSURGERY | Age: 71
End: 2021-05-19
Payer: MEDICARE

## 2021-05-19 VITALS
HEIGHT: 67 IN | HEART RATE: 91 BPM | DIASTOLIC BLOOD PRESSURE: 72 MMHG | OXYGEN SATURATION: 94 % | SYSTOLIC BLOOD PRESSURE: 124 MMHG | WEIGHT: 284 LBS | BODY MASS INDEX: 44.57 KG/M2

## 2021-05-19 DIAGNOSIS — M47.26 OTHER SPONDYLOSIS WITH RADICULOPATHY, LUMBAR REGION: ICD-10-CM

## 2021-05-19 DIAGNOSIS — E66.01 MORBID OBESITY (HCC): Primary | ICD-10-CM

## 2021-05-19 PROCEDURE — 1090F PRES/ABSN URINE INCON ASSESS: CPT | Performed by: NEUROLOGICAL SURGERY

## 2021-05-19 PROCEDURE — 3017F COLORECTAL CA SCREEN DOC REV: CPT | Performed by: NEUROLOGICAL SURGERY

## 2021-05-19 PROCEDURE — 4040F PNEUMOC VAC/ADMIN/RCVD: CPT | Performed by: NEUROLOGICAL SURGERY

## 2021-05-19 PROCEDURE — G8399 PT W/DXA RESULTS DOCUMENT: HCPCS | Performed by: NEUROLOGICAL SURGERY

## 2021-05-19 PROCEDURE — G8428 CUR MEDS NOT DOCUMENT: HCPCS | Performed by: NEUROLOGICAL SURGERY

## 2021-05-19 PROCEDURE — G8417 CALC BMI ABV UP PARAM F/U: HCPCS | Performed by: NEUROLOGICAL SURGERY

## 2021-05-19 PROCEDURE — 4004F PT TOBACCO SCREEN RCVD TLK: CPT | Performed by: NEUROLOGICAL SURGERY

## 2021-05-19 PROCEDURE — 99203 OFFICE O/P NEW LOW 30 MIN: CPT | Performed by: NEUROLOGICAL SURGERY

## 2021-05-19 PROCEDURE — 1123F ACP DISCUSS/DSCN MKR DOCD: CPT | Performed by: NEUROLOGICAL SURGERY

## 2021-05-19 RX ORDER — DORZOLAMIDE HYDROCHLORIDE AND TIMOLOL MALEATE 20; 5 MG/ML; MG/ML
SOLUTION/ DROPS OPHTHALMIC
COMMUNITY
Start: 2021-02-27

## 2021-05-19 NOTE — PROGRESS NOTES
[address]  [address] [place] [id number]  Dept: [phone]    Patient:  Aleksandar Nunes  YOB: 1950  Date: 5/19/21    The patient is a 69 y.o. female who presents today for consult of the following problems:     Chief Complaint   Patient presents with    New Patient     lumbar back pain              HPI:     Aleksandar Nunes is a 69 y.o. female on whom neurosurgical consultation was requested by Pepper Dean MD for management of severe axial low back pain ranging anywhere from 7-10 out of 10 that is been ongoing for a number of years without any specific inciting event. The patient has had previous injections by Dr. Nickolas Mejias sequentially level by level but no significant relief and long-term. She has had remote physical therapy years ago that did not offer significant provement. She does state that she lost approximately 20 pounds the last year and has been mobilizing mainly with a cane for the most part. States that she does have a nonspecific bilateral lower extremity radiating discomfort and radiculopathy that is nondermatomal.  Denies saddle anesthesia bowel bladder incontinence. Symptoms worse with ambulating or changes of in position and significantly affecting her daily ADLs and ability to function. [facility]       History:     Past Medical History:   Diagnosis Date    Alkaline phosphatase elevation 06/09/2016    stable last 2 check    Bronchitis 10/18/2016    Exercise counseling 3/10/2016    Frequent urination 3/7/2019    Headache(784.0)     Hypertension     Leukocytosis 4/11/2016    Obesity     Stroke ([address]     Viral upper respiratory tract infection 6/9/2016     Past Surgical History:   Procedure Laterality Date    BRAIN SURGERY      for Cerebral aneurysm     Family History   Problem Relation Age of Onset    High Blood Pressure Mother     Diabetes Mother     Alcohol Abuse Father     High Blood Pressure Cardiovascular: Negative for chest pain and leg swelling. Gastrointestinal: Negative for nausea and abdominal pain. Endocrine: Negative for cold intolerance and heat intolerance. Genitourinary: Negative for frequency and flank pain. Musculoskeletal: Negative for myalgias and joint swelling. Skin: Negative for rash and wound. Allergic/Immunologic: Negative for environmental allergies and food allergies. Hematological: Negative for adenopathy. Does not bruise/bleed easily. Psychiatric/Behavioral: Negative for self-injury. The patient is not nervous/anxious. Physical Exam:      /72   Pulse 91   Ht 5' 7\" (1.702 m)   Wt 284 lb (128.8 kg)   SpO2 94%   BMI 44.48 kg/m²   Estimated body mass index is 44.48 kg/m² as calculated from the following:    Height as of this encounter: 5' 7\" (1.702 m). Weight as of this encounter: 284 lb (128.8 kg). General:  Román Lipscomb is a 69y.o. year old female who appears her stated age. HEENT: Normocephalic atraumatic. Neck supple. Chest: regular rate; pulses equal  Abdomen: Soft nontender nondistended. Normoactive bowel sounds.   Ext: DP and PT pulses 2+, good cap refill  Neuro    Mentation  Appropriate affect  Registration intact  Orientation intact  3 item recall intact  Judgement intact to situation    Cranial Nerves:   Pupils equal and reactive to light  Extraocular motion intact  Face and shrug symmetric  Tongue midline  No dysarthria  v1-3 sensation symmetric, masseter tone symmetric  Hearing symmetric and intact to finger rub    Sensation:   intact    Motor  L deltoid 5/5; R deltoid 5/5  L biceps 5/5; R biceps 5/5  L triceps 5/5; R triceps 5/5  L wrist extension 5/5; R wrist extension 5/5  L intrinsics 5/5; R intrinsics 5/5     L iliopsoas 5/5 , R iliopsoas 5/5  L quadriceps 5/5; R quadriceps 5/5  L Dorsiflexion 5/5; R dorsiflexion 5/5  L Plantarflexion 5/5; R plantarflexion 5/5  L EHL 5/5; R EHL 5/5    Reflexes  L Brachioradialis 2+/4; R brachioradialis 2+/4  L Biceps 2+/4; R Biceps 2+/4  L Triceps 2+/4; R Triceps 2+/4  L Patellar 2+/4: R Patellar 2+/4  L Achilles 2+/4; R Achilles 2+/4    hoffmans L: neg  hoffmans R: neg  Clonus L: neg  Clonus R: neg  Babinski L: up  Babinski R; up    Studies Review:     Ct myelogram. No sig central stenosis. spondylosis    Assessment and Plan:      1. Morbid obesity ([address]    2. Other spondylosis with radiculopathy, lumbar region          Plan: Consultation on multimodal treatment including significant weight loss, physical therapy for core strengthening gait training as well as insoles and interventional procedures as deemed fit. I do not have any specific surgical intervention considering that the patient has nonspecific multilevel spondylotic disease in the setting of morbid obesity    Followup: No follow-ups on file. Prescriptions Ordered:  No orders of the defined types were placed in this encounter. Orders Placed:  No orders of the defined types were placed in this encounter. Electronically signed by Michelle Mendoza DO on 5/19/2021 at 11:05 AM    Please note that this chart was generated using voice recognition Dragon dictation software. Although every effort was made to ensure the accuracy of this automated transcription, some errors in transcription may have occurred.

## 2021-05-21 ENCOUNTER — HOSPITAL ENCOUNTER (OUTPATIENT)
Dept: INFUSION THERAPY | Facility: MEDICAL CENTER | Age: 71
Discharge: HOME | End: 2021-05-21
Payer: MEDICARE

## 2021-05-21 ENCOUNTER — TELEPHONE (OUTPATIENT)
Dept: INFUSION THERAPY | Facility: MEDICAL CENTER | Age: 71
End: 2021-05-21

## 2021-05-21 VITALS
DIASTOLIC BLOOD PRESSURE: 72 MMHG | HEART RATE: 64 BPM | RESPIRATION RATE: 16 BRPM | TEMPERATURE: 98.8 F | SYSTOLIC BLOOD PRESSURE: 125 MMHG

## 2021-05-21 DIAGNOSIS — E53.8 LOW SERUM VITAMIN B12: Primary | ICD-10-CM

## 2021-05-21 PROCEDURE — 6360000002 HC RX W HCPCS: Performed by: INTERNAL MEDICINE

## 2021-05-21 PROCEDURE — 96372 THER/PROPH/DIAG INJ SC/IM: CPT

## 2021-05-21 RX ORDER — CYANOCOBALAMIN 1000 UG/ML
1000 INJECTION INTRAMUSCULAR; SUBCUTANEOUS ONCE
Status: CANCELLED
Start: 2021-06-18

## 2021-05-21 RX ORDER — CYANOCOBALAMIN 1000 UG/ML
1000 INJECTION INTRAMUSCULAR; SUBCUTANEOUS ONCE
Status: COMPLETED
Start: 2021-05-21 | End: 2021-05-21

## 2021-05-21 RX ORDER — ACETAMINOPHEN 325 MG/1
325 TABLET ORAL
Status: CANCELLED | OUTPATIENT
Start: 2021-06-18

## 2021-05-21 RX ADMIN — CYANOCOBALAMIN 1000 MCG: 1000 INJECTION, SOLUTION INTRAMUSCULAR at 14:03

## 2021-05-21 NOTE — PROGRESS NOTES
Amari Bo arrives per wheelchair with son  Order for vitamin B12 injection  B12 given to rt upper arm   Band aid applied  Discharged per wheelchair with son

## 2021-06-24 PROBLEM — R10.13 DYSPEPSIA: Status: ACTIVE | Noted: 2021-06-24

## 2021-07-18 ENCOUNTER — HOSPITAL ENCOUNTER (EMERGENCY)
Age: 71
Discharge: HOME | End: 2021-07-18
Attending: EMERGENCY MEDICINE
Payer: MEDICARE

## 2021-07-18 ENCOUNTER — APPOINTMENT (OUTPATIENT)
Dept: GENERAL RADIOLOGY | Age: 71
End: 2021-07-18
Payer: MEDICARE

## 2021-07-18 VITALS
TEMPERATURE: 98.1 F | HEART RATE: 81 BPM | BODY MASS INDEX: 45.4 KG/M2 | SYSTOLIC BLOOD PRESSURE: 108 MMHG | OXYGEN SATURATION: 99 % | HEIGHT: 67 IN | RESPIRATION RATE: 16 BRPM | WEIGHT: 289.3 LBS | DIASTOLIC BLOOD PRESSURE: 58 MMHG

## 2021-07-18 DIAGNOSIS — R79.89 ELEVATED D-DIMER: ICD-10-CM

## 2021-07-18 DIAGNOSIS — M25.472 LEFT ANKLE SWELLING: Primary | ICD-10-CM

## 2021-07-18 LAB — D-DIMER QUANTITATIVE: 0.69 MG/L FEU (ref 0–0.59)

## 2021-07-18 PROCEDURE — 99282 EMERGENCY DEPT VISIT SF MDM: CPT

## 2021-07-18 PROCEDURE — 85379 FIBRIN DEGRADATION QUANT: CPT

## 2021-07-18 PROCEDURE — 73610 X-RAY EXAM OF ANKLE: CPT

## 2021-07-19 ENCOUNTER — HOSPITAL ENCOUNTER (OUTPATIENT)
Age: 71
Discharge: HOME | End: 2021-07-19
Payer: MEDICARE

## 2021-07-19 DIAGNOSIS — M25.472 LEFT ANKLE SWELLING: Primary | ICD-10-CM

## 2021-07-19 PROCEDURE — 93971 EXTREMITY STUDY: CPT

## 2021-07-19 NOTE — ED PROVIDER NOTES
EMERGENCY DEPARTMENT ENCOUNTER    Pt Name: Adan Matta  MRN: [id number]  [place] 1950  Date of evaluation: 7/18/21  CHIEF COMPLAINT       Chief Complaint   Patient presents with    Foot Swelling     left, onset 6/12     HISTORY OF PRESENT ILLNESS   Patient is a 78-year-old female with PMH of CVA, and hypertension who presents the ED complaining of left ankle swelling. Symptoms started proximally 1 month ago. She saw her doctor about this issue who adjusted her blood pressure medications however symptoms have not improved. Swelling is a little bit better in the morning. No ankle tenderness, no calf tenderness. No history of DVTs. No chest pain or shortness of breath. Also no fevers, cough, abdominal pain. REVIEW OF SYSTEMS     Review of Systems   All other systems reviewed and are negative. PASTMEDICAL HISTORY     Past Medical History:   Diagnosis Date    Alkaline phosphatase elevation 06/09/2016    stable last 2 check    Bronchitis 10/18/2016    Exercise counseling 3/10/2016    Frequent urination 3/7/2019    Headache(784.0)     Hypertension     Leukocytosis 4/11/2016    Obesity     Stroke ([address]     Viral upper respiratory tract infection 6/9/2016     SURGICAL HISTORY       Past Surgical History:   Procedure Laterality Date    BRAIN SURGERY      for Cerebral aneurysm    TUBAL LIGATION       CURRENT MEDICATIONS       Previous Medications    AMLODIPINE (NORVASC) 5 MG TABLET    Take 1 tablet by mouth daily    ASPIRIN 81 MG TABLET    Take 81 mg by mouth daily. BLOOD PRESSURE KIT    Check B P BID    CHOLECALCIFEROL (VITAMIN D) 2000 UNITS CAPS CAPSULE    Take by mouth daily    DORZOLAMIDE-TIMOLOL (COSOPT) 22.3-6.8 MG/ML OPHTHALMIC SOLUTION    INSTILL 1 DROP IN RIGHT EYE TWICE DAILY    GABAPENTIN (NEURONTIN) 600 MG TABLET    Take 1 tablet by mouth 2 times daily for 90 days.     LEVOTHYROXINE (SYNTHROID) 75 MCG TABLET    Take 1 tablet by mouth Daily    LIOTHYRONINE (CYTOMEL) 5 MCG TABLET Take 1 tablet by mouth daily    OMEPRAZOLE (PRILOSEC) 20 MG DELAYED RELEASE CAPSULE    TAKE 1 TABLET BY MOUTH DAILY    SENNA-DOCUSATE (STOOL SOFTENER & LAXATIVE) 8.6-50 MG PER TABLET    Take 1 tablet by mouth daily. VENLAFAXINE (EFFEXOR XR) 150 MG EXTENDED RELEASE CAPSULE    TAKE ONE CAPSULE BY MOUTH DAILY     ALLERGIES     is allergic to aspirin, baclofen, and wellbutrin [bupropion]. FAMILY HISTORY     She indicated that the status of her mother is unknown. She indicated that the status of her father is unknown. She indicated that the status of her maternal aunt is unknown. She indicated that the status of her other is unknown. SOCIAL HISTORY       Social History     Tobacco Use    Smoking status: Current Every Day Smoker     Packs/day: 1.00     Years: 50.00     Pack years: 50.00     Types: Cigarettes    Smokeless tobacco: Never Used   Vaping Use    Vaping Use: Never used   Substance Use Topics    Alcohol use: No    Drug use: No     PHYSICAL EXAM     INITIAL VITALS: BP (!) 108/58   Pulse 81   Temp 98.1 °F (36.7 °C) (Oral)   Resp 16   Ht 5' 7\" (1.702 m)   Wt 289 lb 4.8 oz (131.2 kg)   SpO2 99%   BMI 45.31 kg/m²    Physical Exam  HENT:      Head: Normocephalic. Right Ear: External ear normal.      Left Ear: External ear normal.      Nose: Nose normal.   Eyes:      Conjunctiva/sclera: Conjunctivae normal.   Cardiovascular:      Rate and Rhythm: Normal rate. Pulmonary:      Effort: Pulmonary effort is normal.   Abdominal:      General: Abdomen is flat. Musculoskeletal:      Left lower leg: Edema present. Comments: No calf tenderness, no swelling. Skin:     General: Skin is dry. Neurological:      Mental Status: She is alert. Mental status is at baseline. Psychiatric:         Mood and Affect: Mood normal.         Behavior: Behavior normal.         MEDICAL DECISION MAKING:   The patient is hemodynamically stable, afebrile, nontoxic-appearing.   Physical exam notable for left ankle pitting edema. Based on history and exam low suspicion for DVT, likely from medications, diet, renal, liver function. ED plan for D-dimer, x-ray, reassess. DIAGNOSTIC RESULTS   EKG:All EKG's are interpreted by the Emergency Department Physician who either signs or Co-signs this chart in the absence of a cardiologist.        RADIOLOGY:All plain film, CT, MRI, and formal ultrasound images (except ED bedside ultrasound) are read by the radiologist, see reports below, unless otherwisenoted in MDM or here. XR ANKLE LEFT (MIN 3 VIEWS)   Final Result   Osteopenia. No acute osseous abnormality in the left ankle. Diffuse soft tissue swelling in the lower leg and ankle may represent edema   or cellulitis. VL DUP LOWER EXTREMITY VENOUS LEFT    (Results Pending)     LABS: All lab results were reviewed by myself, and all abnormals are listed below. Labs Reviewed   D-DIMER, QUANTITATIVE - Abnormal; Notable for the following components:       Result Value    D-Dimer, Quant 0.69 (*)     All other components within normal limits       EMERGENCY DEPARTMENTCOURSE:   Patient did well in the ED. X-ray negative for acute osseous injury. D-dimer 0.69. Lungs clear bilaterally. Patient return tomorrow morning at 7 AM for official vascular ultrasound left leg as we do not have 24/7 ultrasound available. No further work-up indicated at this time. Nursing notes reviewed. At this time this is what I find, the patient appears well and does not appear sick or toxic. I gave my usual and customary discussion of the risks and benefits of discharge versus admission. I answered the patient's questions. I gave the patient strict return precautions. Patient expressed understanding of the discharge instructions. Dictated but not reviewed.         Vitals:    Vitals:    07/18/21 1844   BP: (!) 108/58   Pulse: 81   Resp: 16   Temp: 98.1 °F (36.7 °C)   TempSrc: Oral   SpO2: 99%   Weight: 289 lb 4.8 oz (131.2 kg) Height: 5' 7\" (1.702 m)       The patient was given the following medications while in the emergency department:  No orders of the defined types were placed in this encounter. CONSULTS:  None    FINAL IMPRESSION      1. Left ankle swelling    2.  Elevated d-dimer          DISPOSITION/PLAN   DISPOSITION Decision To Discharge 07/18/2021 09:19:29 PM      PATIENT REFERRED TO:  Janneth Estrada MD  [address]  [phone]    In 2 days      DISCHARGE MEDICATIONS:  New Prescriptions    No medications on file     North Salgado MD  Attending Emergency Physician                    Gabrielle Middleton MD  07/18/21 [id number]

## 2021-07-28 ENCOUNTER — HOSPITAL ENCOUNTER (OUTPATIENT)
Age: 71
Setting detail: SPECIMEN
Discharge: HOME | End: 2021-07-28
Payer: MEDICARE

## 2021-07-28 DIAGNOSIS — M25.472 LEFT ANKLE SWELLING: ICD-10-CM

## 2021-07-28 LAB
SEDIMENTATION RATE, ERYTHROCYTE: 57 MM (ref 0–30)
URIC ACID: 5.5 MG/DL (ref 2.4–5.7)

## 2021-07-28 PROCEDURE — 85652 RBC SED RATE AUTOMATED: CPT

## 2021-07-28 PROCEDURE — 84550 ASSAY OF BLOOD/URIC ACID: CPT

## 2021-07-28 PROCEDURE — 36415 COLL VENOUS BLD VENIPUNCTURE: CPT

## 2021-08-02 PROBLEM — M25.472 LEFT ANKLE SWELLING: Status: ACTIVE | Noted: 2021-08-02

## 2021-08-23 ENCOUNTER — HOSPITAL ENCOUNTER (OUTPATIENT)
Age: 71
Discharge: HOME | End: 2021-08-23
Payer: MEDICARE

## 2021-08-23 DIAGNOSIS — Z79.899 ON POTASSIUM WASTING DIURETIC THERAPY: ICD-10-CM

## 2021-08-23 DIAGNOSIS — I10 ESSENTIAL HYPERTENSION: ICD-10-CM

## 2021-08-23 DIAGNOSIS — Z79.899 ON ANGIOTENSIN RECEPTOR BLOCKERS (ARB): ICD-10-CM

## 2021-08-23 DIAGNOSIS — R60.0 LOCALIZED EDEMA: ICD-10-CM

## 2021-08-23 LAB
ALBUMIN SERPL-MCNC: 3.9 G/DL (ref 3.5–5.2)
ALBUMIN/GLOBULIN RATIO: 0.9 (ref 1–2.5)
ALP BLD-CCNC: 111 U/L (ref 35–104)
ALT SERPL-CCNC: 11 U/L (ref 5–33)
ANION GAP SERPL CALCULATED.3IONS-SCNC: 11 MMOL/L (ref 9–17)
AST SERPL-CCNC: 13 U/L
BILIRUB SERPL-MCNC: 0.38 MG/DL (ref 0.3–1.2)
BUN BLDV-MCNC: 9 MG/DL (ref 8–23)
BUN/CREAT BLD: ABNORMAL (ref 9–20)
CALCIUM SERPL-MCNC: 9.7 MG/DL (ref 8.6–10.4)
CHLORIDE BLD-SCNC: 99 MMOL/L (ref 98–107)
CO2: 29 MMOL/L (ref 20–31)
CREAT SERPL-MCNC: 1.19 MG/DL (ref 0.5–0.9)
GFR AFRICAN AMERICAN: 54 ML/MIN
GFR NON-AFRICAN AMERICAN: 45 ML/MIN
GFR SERPL CREATININE-BSD FRML MDRD: ABNORMAL ML/MIN/{1.73_M2}
GFR SERPL CREATININE-BSD FRML MDRD: ABNORMAL ML/MIN/{1.73_M2}
GLUCOSE FASTING: 113 MG/DL (ref 70–99)
POTASSIUM SERPL-SCNC: 4 MMOL/L (ref 3.7–5.3)
SODIUM BLD-SCNC: 139 MMOL/L (ref 135–144)
TOTAL PROTEIN: 8.4 G/DL (ref 6.4–8.3)

## 2021-08-23 PROCEDURE — 36415 COLL VENOUS BLD VENIPUNCTURE: CPT

## 2021-08-23 PROCEDURE — 80053 COMPREHEN METABOLIC PANEL: CPT

## 2021-09-01 PROBLEM — E88.09 HYPERPROTEINEMIA: Status: ACTIVE | Noted: 2021-09-01

## 2021-09-01 PROBLEM — E66.01 MORBID OBESITY (HCC): Status: ACTIVE | Noted: 2021-09-01

## 2021-11-05 ENCOUNTER — TELEPHONE (OUTPATIENT)
Dept: ONCOLOGY | Age: 71
End: 2021-11-05

## 2021-11-05 ENCOUNTER — OFFICE VISIT (OUTPATIENT)
Dept: ONCOLOGY | Age: 71
End: 2021-11-05
Payer: MEDICARE

## 2021-11-05 ENCOUNTER — HOSPITAL ENCOUNTER (OUTPATIENT)
Facility: MEDICAL CENTER | Age: 71
Discharge: HOME | End: 2021-11-05
Payer: MEDICARE

## 2021-11-05 VITALS
TEMPERATURE: 98.3 F | BODY MASS INDEX: 43.32 KG/M2 | HEART RATE: 81 BPM | SYSTOLIC BLOOD PRESSURE: 116 MMHG | DIASTOLIC BLOOD PRESSURE: 65 MMHG | RESPIRATION RATE: 18 BRPM | WEIGHT: 276.6 LBS

## 2021-11-05 DIAGNOSIS — E53.8 LOW SERUM VITAMIN B12: ICD-10-CM

## 2021-11-05 DIAGNOSIS — D72.823 LEUKEMOID REACTION: Primary | ICD-10-CM

## 2021-11-05 DIAGNOSIS — D47.2 MGUS (MONOCLONAL GAMMOPATHY OF UNKNOWN SIGNIFICANCE): ICD-10-CM

## 2021-11-05 DIAGNOSIS — D72.823 LEUKEMOID REACTION: ICD-10-CM

## 2021-11-05 DIAGNOSIS — D75.839 THROMBOCYTOSIS: ICD-10-CM

## 2021-11-05 LAB
ABSOLUTE EOS #: 0.37 K/UL (ref 0–0.44)
ABSOLUTE IMMATURE GRANULOCYTE: 0.08 K/UL (ref 0–0.3)
ABSOLUTE LYMPH #: 3.15 K/UL (ref 1.1–3.7)
ABSOLUTE MONO #: 1.03 K/UL (ref 0.1–1.2)
ALBUMIN SERPL-MCNC: 4 G/DL (ref 3.5–5.2)
ALBUMIN/GLOBULIN RATIO: ABNORMAL (ref 1–2.5)
ALP BLD-CCNC: 119 U/L (ref 35–104)
ALT SERPL-CCNC: 11 U/L (ref 5–33)
ANION GAP SERPL CALCULATED.3IONS-SCNC: 10 MMOL/L (ref 9–17)
AST SERPL-CCNC: 12 U/L
BASOPHILS # BLD: 1 % (ref 0–2)
BASOPHILS ABSOLUTE: 0.09 K/UL (ref 0–0.2)
BILIRUB SERPL-MCNC: 0.31 MG/DL (ref 0.3–1.2)
BUN BLDV-MCNC: 19 MG/DL (ref 8–23)
BUN/CREAT BLD: 16 (ref 9–20)
CALCIUM SERPL-MCNC: 9.9 MG/DL (ref 8.6–10.4)
CHLORIDE BLD-SCNC: 99 MMOL/L (ref 98–107)
CO2: 30 MMOL/L (ref 20–31)
CREAT SERPL-MCNC: 1.21 MG/DL (ref 0.5–0.9)
DIFFERENTIAL TYPE: ABNORMAL
EOSINOPHILS RELATIVE PERCENT: 3 % (ref 1–4)
FREE KAPPA/LAMBDA RATIO: 0.93 (ref 0.26–1.65)
GFR AFRICAN AMERICAN: 53 ML/MIN
GFR NON-AFRICAN AMERICAN: 44 ML/MIN
GFR SERPL CREATININE-BSD FRML MDRD: ABNORMAL ML/MIN/{1.73_M2}
GFR SERPL CREATININE-BSD FRML MDRD: ABNORMAL ML/MIN/{1.73_M2}
GLUCOSE BLD-MCNC: 98 MG/DL (ref 70–99)
HCT VFR BLD CALC: 46.9 % (ref 36.3–47.1)
HEMOGLOBIN: 13.7 G/DL (ref 11.9–15.1)
IMMATURE GRANULOCYTES: 1 %
KAPPA FREE LIGHT CHAINS QNT: 4.86 MG/DL (ref 0.37–1.94)
LAMBDA FREE LIGHT CHAINS QNT: 5.25 MG/DL (ref 0.57–2.63)
LYMPHOCYTES # BLD: 24 % (ref 24–43)
MCH RBC QN AUTO: 24.5 PG (ref 25.2–33.5)
MCHC RBC AUTO-ENTMCNC: 29.2 G/DL (ref 28.4–34.8)
MCV RBC AUTO: 83.8 FL (ref 82.6–102.9)
MONOCYTES # BLD: 8 % (ref 3–12)
NRBC AUTOMATED: 0 PER 100 WBC
PDW BLD-RTO: 17.9 % (ref 11.8–14.4)
PLATELET # BLD: 495 K/UL (ref 138–453)
PLATELET ESTIMATE: ABNORMAL
PMV BLD AUTO: 9 FL (ref 8.1–13.5)
POTASSIUM SERPL-SCNC: 5 MMOL/L (ref 3.7–5.3)
RBC # BLD: 5.6 M/UL (ref 3.95–5.11)
RBC # BLD: ABNORMAL 10*6/UL
SEG NEUTROPHILS: 63 % (ref 36–65)
SEGMENTED NEUTROPHILS ABSOLUTE COUNT: 8.48 K/UL (ref 1.5–8.1)
SODIUM BLD-SCNC: 139 MMOL/L (ref 135–144)
TOTAL PROTEIN: 8 G/DL (ref 6.4–8.3)
WBC # BLD: 13.2 K/UL (ref 3.5–11.3)
WBC # BLD: ABNORMAL 10*3/UL

## 2021-11-05 PROCEDURE — 1090F PRES/ABSN URINE INCON ASSESS: CPT | Performed by: INTERNAL MEDICINE

## 2021-11-05 PROCEDURE — G8427 DOCREV CUR MEDS BY ELIG CLIN: HCPCS | Performed by: INTERNAL MEDICINE

## 2021-11-05 PROCEDURE — G8399 PT W/DXA RESULTS DOCUMENT: HCPCS | Performed by: INTERNAL MEDICINE

## 2021-11-05 PROCEDURE — 84155 ASSAY OF PROTEIN SERUM: CPT

## 2021-11-05 PROCEDURE — 80053 COMPREHEN METABOLIC PANEL: CPT

## 2021-11-05 PROCEDURE — 4040F PNEUMOC VAC/ADMIN/RCVD: CPT | Performed by: INTERNAL MEDICINE

## 2021-11-05 PROCEDURE — 84165 PROTEIN E-PHORESIS SERUM: CPT

## 2021-11-05 PROCEDURE — 99214 OFFICE O/P EST MOD 30 MIN: CPT | Performed by: INTERNAL MEDICINE

## 2021-11-05 PROCEDURE — 4004F PT TOBACCO SCREEN RCVD TLK: CPT | Performed by: INTERNAL MEDICINE

## 2021-11-05 PROCEDURE — 83883 ASSAY NEPHELOMETRY NOT SPEC: CPT

## 2021-11-05 PROCEDURE — 3017F COLORECTAL CA SCREEN DOC REV: CPT | Performed by: INTERNAL MEDICINE

## 2021-11-05 PROCEDURE — 36415 COLL VENOUS BLD VENIPUNCTURE: CPT

## 2021-11-05 PROCEDURE — 85025 COMPLETE CBC W/AUTO DIFF WBC: CPT

## 2021-11-05 PROCEDURE — 1123F ACP DISCUSS/DSCN MKR DOCD: CPT | Performed by: INTERNAL MEDICINE

## 2021-11-05 PROCEDURE — G8484 FLU IMMUNIZE NO ADMIN: HCPCS | Performed by: INTERNAL MEDICINE

## 2021-11-05 PROCEDURE — G8417 CALC BMI ABV UP PARAM F/U: HCPCS | Performed by: INTERNAL MEDICINE

## 2021-11-05 PROCEDURE — 99211 OFF/OP EST MAY X REQ PHY/QHP: CPT | Performed by: INTERNAL MEDICINE

## 2021-11-05 NOTE — TELEPHONE ENCOUNTER
Carolina Samples MD VISIT  DR Tony Carmona IN TO SEE PATIENT  ORDERS RECEIVED  RV 6 MONTHS WITH LABS BEFORE  LABS CDP CMP VITAMIN B12 & FOLATE KAPPA LAMBDA FREE LIGHT CHAINS SPEP TO BE DONE 4/29/22, ORDERS GIVEN TO PATIENT  MD VISIT 5/6/22 @12PM  AVS PRINTED AND GIVEN TO PATIENT WITH INSTRUCTIONS  PATIENT DISCHARGED AMBULATORY

## 2021-11-08 LAB
ALBUMIN (CALCULATED): 4.2 G/DL (ref 3.2–5.2)
ALBUMIN PERCENT: 49 % (ref 45–65)
ALPHA 1 PERCENT: 3 % (ref 3–6)
ALPHA 2 PERCENT: 12 % (ref 6–13)
ALPHA-1-GLOBULIN: 0.3 G/DL (ref 0.1–0.4)
ALPHA-2-GLOBULIN: 1.1 G/DL (ref 0.5–0.9)
BETA GLOBULIN: 0.9 G/DL (ref 0.5–1.1)
BETA PERCENT: 10 % (ref 11–19)
GAMMA GLOBULIN %: 26 % (ref 9–20)
GAMMA GLOBULIN: 2.2 G/DL (ref 0.5–1.5)
PATHOLOGIST: ABNORMAL
PROTEIN ELECTROPHORESIS, SERUM: ABNORMAL
TOTAL PROT. SUM,%: 100 % (ref 98–102)
TOTAL PROT. SUM: 8.7 G/DL (ref 6.3–8.2)
TOTAL PROTEIN: 8.6 G/DL (ref 6.4–8.3)

## 2021-12-26 NOTE — PROGRESS NOTES
DIAGNOSIS:   1. Leukocytosis, thrombocytosis, likely leukemoid reaction  2. MGUS  3. Low B12    CURRENT THERAPY:  Monthly B12  Observation for MGUS   BRIEF CASE HISTORY:   Kelly Patient is a very pleasant 66 y.o. female who is referred to us for leukocytosis. She has history of spinal damage and back pain, she has had several procedures. She has history of hypertension and hypothyroidism. Her sister and aunt had breast, sister had leukemia. The patient has history of cysts but no malignancy, her last mammogram was 12/2020 with negative results. She smokes a pack every 3 days. Cancer screening, CT of the chest was done in 2019 and was essentially negative. Mammogram December/2020 that was negative. Last colonoscopy was also in 2019 and showed multiple adenomatous polyps but no cancer. Work-up for the patient showed negative JAK2 mutation and negative BCR ABL, she had small MGUS under 1 g. She had normal kidney function and normal hemoglobin and no hypercalcemia. We decided to observe. INTERIM HISTORY: The patient presents for follow up today to review work up. Unfortunately she did not do her blood work done today. And we will get the labs test after she leaves. PAST MEDICAL HISTORY: has a past medical history of Alkaline phosphatase elevation, Bronchitis, Exercise counseling, Frequent urination, Headache(784.0), Hypertension, Leukocytosis, Obesity, Stroke ([address] and Viral upper respiratory tract infection. PAST SURGICAL HISTORY: has a past surgical history that includes brain surgery and Tubal ligation. CURRENT MEDICATIONS:  has a current medication list which includes the following prescription(s): omeprazole, levothyroxine, liothyronine, venlafaxine, losartan-hydrochlorothiazide, vitamin d, aspirin, blood pressure, and dorzolamide-timolol. ALLERGIES:  is allergic to aspirin, baclofen, and wellbutrin [bupropion].     FAMILY HISTORY: Sister and aunt: breast cancer; sister: leukemia SOCIAL HISTORY:  reports that she has been smoking cigarettes. She has a 50.00 pack-year smoking history. She has never used smokeless tobacco. She reports that she does not drink alcohol and does not use drugs. REVIEW OF SYSTEMS:   General: No fever or night sweats. Weight is stable. ENT: No double or blurred vision, no tinnitus or hearing problem, no dysphagia or sore throat   Respiratory: No chest pain, no shortness of breath, no cough or hemoptysis. Cardiovascular: Denies chest pain, PND or orthopnea. No L E swelling or palpitations. Gastrointestinal: No nausea or vomiting, abdominal pain, diarrhea or constipation. Genitourinary: Denies dysuria, hematuria, frequency, urgency or incontinence. Neurological: Denies headaches, decreased LOC, no sensory or motor focal deficits. +pain around brain shunt  Musculoskeletal: No arthralgia or joint swelling. +chronic back pain  Skin: There are no rashes or bleeding. Psychiatric: No anxiety, no depression. Endocrine: No diabetes or thyroid disease. Hematologic: No bleeding, no adenopathy. PHYSICAL EXAM: Shows a well appearing 66y.o.-year-old female who is not in pain or distress. Vital Signs: Blood pressure 116/65, pulse 81, temperature 98.3 °F (36.8 °C), temperature source Temporal, resp. rate 18, weight 276 lb 9.6 oz (125.5 kg). HEENT: Normocephalic and atraumatic. Pupils are equal, round, reactive to light and accommodation. Extraocular muscles are intact. Neck: Showed no JVD, no carotid bruit . Lungs: Clear to auscultation bilaterally. Heart: Regular without any murmur. Abdomen: Soft, nontender. No hepatosplenomegaly. Extremities: Lower extremities show no edema, clubbing, or cyanosis. Breasts: Examination not done today.  Neuro exam: intact cranial nerves bilaterally no motor or sensory deficit, gait is normal. Lymphatic: no adenopathy appreciated in the supraclavicular, axillary, cervical or inguinal area    REVIEW OF LABORATORY DATA:   Lab Results   Component Value Date    WBC 13.2 (H) 11/05/2021    HGB 13.7 11/05/2021    HCT 46.9 11/05/2021    MCV 83.8 11/05/2021     (H) 11/05/2021       Chemistry        Component Value Date/Time     08/23/2021 0926    K 4.0 08/23/2021 0926    CL 99 08/23/2021 0926    CO2 29 08/23/2021 0926    BUN 9 08/23/2021 0926    CREATININE 1.19 (H) 08/23/2021 0926        Component Value Date/Time    CALCIUM 9.7 08/23/2021 0926    ALKPHOS 111 (H) 08/23/2021 0926    AST 13 08/23/2021 0926    ALT 11 08/23/2021 0926    BILITOT 0.38 08/23/2021 0926            REVIEW OF RADIOLOGICAL RESULTS:     IMPRESSION:   1. Leukocytosis  2. HX hypertensin and hypothyroidism   3. Low B12 - plan for supplementation  4. Small MGUS without any signs of myeloma  5. Smoking addiction - no plan to quit    PLAN:   1. We discussed her current pain symptoms, condition seems to be stable, leukocytosis is stable. 2. We will obtain labs today and follow-up.   3. We will see her back in 6 months for a follow-up    Addendum  Lab Results   Component Value Date    WBC 13.2 (H) 11/05/2021    HGB 13.7 11/05/2021    HCT 46.9 11/05/2021    MCV 83.8 11/05/2021     (H) 11/05/2021       Chemistry        Component Value Date/Time     11/05/2021 1236    K 5.0 11/05/2021 1236    CL 99 11/05/2021 1236    CO2 30 11/05/2021 1236    BUN 19 11/05/2021 1236    CREATININE 1.21 (H) 11/05/2021 1236        Component Value Date/Time    CALCIUM 9.9 11/05/2021 1236    ALKPHOS 119 (H) 11/05/2021 1236    AST 12 11/05/2021 1236    ALT 11 11/05/2021 1236    BILITOT 0.31 11/05/2021 1236        Serum protein electrophoresis and light chains are pending Pfizer dose 1, 2, and 3

## 2022-02-01 ENCOUNTER — INITIAL CONSULT (OUTPATIENT)
Dept: PAIN MANAGEMENT | Age: 72
End: 2022-02-01
Payer: MEDICARE

## 2022-02-01 VITALS
HEART RATE: 89 BPM | HEIGHT: 67 IN | BODY MASS INDEX: 41.12 KG/M2 | SYSTOLIC BLOOD PRESSURE: 122 MMHG | WEIGHT: 262 LBS | OXYGEN SATURATION: 87 % | DIASTOLIC BLOOD PRESSURE: 84 MMHG

## 2022-02-01 DIAGNOSIS — M47.817 LUMBOSACRAL SPONDYLOSIS WITHOUT MYELOPATHY: Primary | ICD-10-CM

## 2022-02-01 DIAGNOSIS — M54.51 VERTEBROGENIC LOW BACK PAIN: ICD-10-CM

## 2022-02-01 PROCEDURE — G8427 DOCREV CUR MEDS BY ELIG CLIN: HCPCS | Performed by: ANESTHESIOLOGY

## 2022-02-01 PROCEDURE — G8484 FLU IMMUNIZE NO ADMIN: HCPCS | Performed by: ANESTHESIOLOGY

## 2022-02-01 PROCEDURE — 99204 OFFICE O/P NEW MOD 45 MIN: CPT | Performed by: ANESTHESIOLOGY

## 2022-02-01 PROCEDURE — 4040F PNEUMOC VAC/ADMIN/RCVD: CPT | Performed by: ANESTHESIOLOGY

## 2022-02-01 PROCEDURE — 1090F PRES/ABSN URINE INCON ASSESS: CPT | Performed by: ANESTHESIOLOGY

## 2022-02-01 PROCEDURE — 3017F COLORECTAL CA SCREEN DOC REV: CPT | Performed by: ANESTHESIOLOGY

## 2022-02-01 PROCEDURE — G8417 CALC BMI ABV UP PARAM F/U: HCPCS | Performed by: ANESTHESIOLOGY

## 2022-02-01 PROCEDURE — G8399 PT W/DXA RESULTS DOCUMENT: HCPCS | Performed by: ANESTHESIOLOGY

## 2022-02-01 PROCEDURE — 4004F PT TOBACCO SCREEN RCVD TLK: CPT | Performed by: ANESTHESIOLOGY

## 2022-02-01 PROCEDURE — 1123F ACP DISCUSS/DSCN MKR DOCD: CPT | Performed by: ANESTHESIOLOGY

## 2022-02-01 ASSESSMENT — ENCOUNTER SYMPTOMS
GASTROINTESTINAL NEGATIVE: 1
RESPIRATORY NEGATIVE: 1
ALLERGIC/IMMUNOLOGIC NEGATIVE: 1
EYES NEGATIVE: 1
BACK PAIN: 1

## 2022-02-01 NOTE — PROGRESS NOTES
The patient is a 66 y. o. Non- / non  female. Chief Complaint   Patient presents with    Back Pain        HPI   Requesting physician for the evaluation of Eliana López 1950: Gordo Vernon MD    Pain History  76-year-old patient with history of chronic low back pain onset many years ago located in the lower lumbar area across midline affect both side left more than right  Extends over the gluteal region  No further radiation down leg  No associated numbness or paresthesia  No changes in bladder or bowel control  She states she is seeing pain management at [facility] and required numerous injection there which she did not find much helpful  No clear description of those injection is available  She did physical therapy about 5 years ago  She has seen orthopedic spine surgeon Dr. Zhao king and neurosurgeon Dr. Mahendra Gomez at [facility] and was not advised for any surgery  Past medical history significant for morbid obesity  Patient states you have tried NSAIDs muscle relaxant and gabapentin and did not find them helpful  She was prescribed Percocet in past and is asking for pain medication  No recent diagnostic work-up available  She had CT myelogram about 2 years ago  It has shown severe degenerative disc changes with endplate degeneration  Also showed facet arthropathy  No significant spinal stenosis  Patient is not a candidate for MRI because of her brain aneurysm clips  Pain score today  8  1. Location:back  2. Radiation:yes b/l legs  3. Character:numbness,tingling sharp   5. Duration:intermittent  6. Onset: years  7. Did an injury cause pain: yes-fall  8. Aggravating factors:bending twisting walking  9. Alleviating factors:advil, oxycodone  10. Associated symptoms (numbness / tingling / weakness):  Yes numbness tingling weakness  -Where at :b/l legs  -Down into finger tips or toes (specify which finger or toes):yes   -constant or intermitting:constant  11.  Red Flags: (weight loss / chills / loss of bladder or bowel control):no    Previous management history  1. Previous diagnostic workup: (Imaging/EMG)   CT, MRI, or Xray: yes MRI   What part of the body:back  What facility did they have it at:[facility]   What year or specific date: 2021  EMG:  no    2. Previous non interventional treatments tried:  chiropractor or physical therapy: PT   What part of the body:legs  What facility was it done at: [facility]  How long ago was it last tried:years  Did it work: Yes  Did they complete it:Yes    3. Previous Medications tried  NSAID's: yes advil  Neurontin: yes gabapentin  Lyrica: no  Trycyclic antidepressant (Ellavil / Pamelor ): no  Cymbalta: no  Opioids (Ultram / Vicodin / Percocet / Morphine / Dilaudid / Oramorph/ Fentanyl etc.): oxycodone  Last Pain medication taken (name of med and date):    4. Previous Interventional pain procedures tried:  What kind of injection:steroid  Who did the injection: pain mgmt [facility]  did the injection help: no  Last time injection was done:2005    5. Previous surgeries for pain  What part of the body did they have the surgery:nerve cut in back  What physician did the surgery: [address] [facility] did they have the surgery done: [facility]  Date of Surgery: [id number]    Social History:  Marital status:single  Employment History:retired   Working  no  Full time Or Part time: n/a  Disability  no  Legal Issues related to pain complaint: no    Pain Disability Index score : 70    Lab Results   Component Value Date    LABA1C 6.0 12/10/2020     Lab Results   Component Value Date     12/10/2020         Informant:patient        Past Medical History:   Diagnosis Date    Alkaline phosphatase elevation 06/09/2016    stable last 2 check    Bronchitis 10/18/2016    Exercise counseling 3/10/2016    Frequent urination 3/7/2019    Headache(784.0)     Hypertension     Leukocytosis 4/11/2016    Obesity     [address] [address]     Viral upper respiratory tract infection 6/9/2016        Past Surgical History:   Procedure Laterality Date    BRAIN SURGERY      for Cerebral aneurysm    TUBAL LIGATION         Social History     Socioeconomic History    Marital status: Single     Spouse name: None    Number of children: None    Years of education: None    Highest education level: None   Occupational History    None   Tobacco Use    Smoking status: Current Every Day Smoker     Packs/day: 1.00     Years: 50.00     Pack years: 50.00     Types: Cigarettes    Smokeless tobacco: Never Used   Vaping Use    Vaping Use: Never used   Substance and Sexual Activity    Alcohol use: No    Drug use: No    Sexual activity: Not Currently   Other Topics Concern    None   Social History Narrative    None     Social Determinants of Health     Financial Resource Strain:     Difficulty of Paying Living Expenses: Not on file   Food Insecurity:     Worried About Running Out of Food in the Last Year: Not on file    Dylan of Food in the Last Year: Not on file   Transportation Needs:     Lack of Transportation (Medical): Not on file    Lack of Transportation (Non-Medical):  Not on file   Physical Activity:     Days of Exercise per Week: Not on file    Minutes of Exercise per Session: Not on file   Stress:     Feeling of Stress : Not on file   Social Connections:     Frequency of Communication with Friends and Family: Not on file    Frequency of Social Gatherings with Friends and Family: Not on file    Attends [employer] Services: Not on file    Active Member of Clubs or Organizations: Not on file    Attends Club or Organization Meetings: Not on file    Marital Status: Not on file   Intimate Partner Violence:     Fear of Current or Ex-Partner: Not on file    Emotionally Abused: Not on file    Physically Abused: Not on file    Sexually Abused: Not on file   Housing Stability:     Unable to Pay for Housing in the Last Year: Not on file    Number of [place] in the Last Year: Not on file    Unstable Housing in the Last Year: Not on file       Family History   Problem Relation Age of Onset    High Blood Pressure Mother     Diabetes Mother     Alcohol Abuse Father     High Blood Pressure Sister     Diabetes Sister     Cancer Sister         leukemia    Alcohol Abuse Sister     Thyroid Disease Other     Breast Cancer Sister         aunt     Cancer Maternal Aunt         breast       Allergies   Allergen Reactions    Aspirin      Regular aspirin gives stomach cramps    Baclofen Itching    Wellbutrin [Bupropion] Itching       Vitals:    02/01/22 1122   BP: 122/84   Pulse: 89   SpO2: (!) 87%       Current Outpatient Medications   Medication Sig Dispense Refill    omeprazole (PRILOSEC) 20 MG delayed release capsule TAKE 1 CAPSULE BY MOUTH DAILY 90 capsule 0    levothyroxine (SYNTHROID) 75 MCG tablet Take 1 tablet by mouth Daily 90 tablet 0    liothyronine (CYTOMEL) 5 MCG tablet Take 1 tablet by mouth daily 90 tablet 0    venlafaxine (EFFEXOR XR) 150 MG extended release capsule TAKE ONE CAPSULE BY MOUTH DAILY 90 capsule 0    losartan-hydroCHLOROthiazide (HYZAAR) 50-12.5 MG per tablet Take 1 tablet by mouth daily 90 tablet 0    Blood Pressure KIT Check B P BID 1 kit 0    dorzolamide-timolol (COSOPT) 22.3-6.8 MG/ML ophthalmic solution INSTILL 1 DROP IN RIGHT EYE TWICE DAILY      Cholecalciferol (VITAMIN D) 2000 UNITS CAPS capsule Take by mouth daily      aspirin 81 MG tablet Take 81 mg by mouth daily. No current facility-administered medications for this visit. Review of Systems   Constitutional: Negative. Negative for fever. HENT: Negative. Eyes: Negative. Respiratory: Negative. Cardiovascular: Negative. Gastrointestinal: Negative. Endocrine: Negative. Genitourinary: Negative. Musculoskeletal: Positive for back pain. Skin: Negative. Allergic/Immunologic: Negative. Neurological: Positive for weakness and numbness. Psychiatric/Behavioral: Negative. Objective:  General Appearance:  Uncomfortable and in pain. Vital signs: (most recent): Blood pressure 122/84, pulse 89, height 5' 7\" (1.702 m), weight 262 lb (118.8 kg), SpO2 (!) 87 %. Vital signs are normal.  No fever. Output: Producing urine and producing stool. HEENT: Normal HEENT exam.    Lungs:  Normal effort. She is not in respiratory distress. Heart: Normal rate. Extremities: Normal range of motion. There is no deformity. Neurological: Patient is alert and oriented to person, place and time. Patient has normal coordination. Pupils:  Pupils are equal, round, and reactive to light. Pupils are equal.   Skin:  Warm and dry. No rash or cyanosis.    Lumbar spine examination  No apparent deformity on inspection  Range of motion is moderately limited associated pain particularly in spine extension  Positive tenderness over bilateral lower lumbar paraspinal muscle  Facet loading maneuver positive  Gait is stable  Axial loading also reproduces her back pain    Assessment & Plan     Pain History  66year-old patient with history of chronic low back pain onset many years ago located in the lower lumbar area across midline affect both side left more than right  Extends over the gluteal region  No further radiation down leg  No associated numbness or paresthesia  No changes in bladder or bowel control  She states she is seeing pain management at [facility] and required numerous injection there which she did not find much helpful  No clear description of those injection is available  She did physical therapy about 5 years ago  She has seen orthopedic spine surgeon Dr. Ernesto king and neurosurgeon Dr. Semaj Contreras at [place] and was not advised for any surgery  Past medical history significant for morbid obesity  Patient states you have tried NSAIDs muscle relaxant and gabapentin and did not find them helpful  She was prescribed Percocet in past and is asking for pain medication  No recent diagnostic work-up available  She had CT myelogram about 2 years ago  It has shown severe degenerative disc changes with endplate degeneration  Also showed facet arthropathy  No significant spinal stenosis  Patient is not a candidate for MRI because of her brain aneurysm clips  Pain score today  8    EXAMINATION: CT OF THE LUMBAR SPINE WITH CONTRAST  9/10/2020 2:28 pm    Impression Status post intrathecal administration of contrast  Degenerative disc disease as described above, without spinal canal narrowing. Foraminal narrowing, moderate to severe at right L5-S1, moderate at right L4-5, mild to moderate at left L5-S1, minimal at left L4-5.             1. Lumbosacral spondylosis without myelopathy    2.  BMI 40.0-44.9, adult (HCC)    3. Vertebrogenic low back pain        CT myelogram lumbar spine  Report is reviewed  Images were reviewed independently  Finding discussed in detail with patient  Severe degenerative disc disease at L4-5 and L5-S1 level with the endplate irregularities    I suspected predominant axial back pain related to facet arthropathy and vertebral agenic low back pain    She has not done therapy for several years  We will refer patient for physical therapy    With regard to opioid therapy, explained to patient that I will not recommend or prescribe opioid considering lack of exhaustion of other modalities and risk of respiratory depression considering advanced age and morbid obesity    I think she can benefit from intercept procedure for axial back pain and diagnostic facet block could be helpful  I have explained to her that she never had intraseptal procedure because Dr. Melissa Luu was treating her is not trained to do that procedure  We have provided her information material  She is advised to contact this if she decide to proceed with our interventional recommendations    Consultation note sent to the referring physician  Orders Placed This

## 2022-02-15 DIAGNOSIS — M54.51 VERTEBROGENIC LOW BACK PAIN: Primary | ICD-10-CM

## 2022-02-16 ENCOUNTER — HOSPITAL ENCOUNTER (OUTPATIENT)
Dept: GENERAL RADIOLOGY | Age: 72
Discharge: HOME | End: 2022-02-18
Payer: MEDICARE

## 2022-02-16 ENCOUNTER — HOSPITAL ENCOUNTER (OUTPATIENT)
Dept: PREADMISSION TESTING | Age: 72
Discharge: HOME | End: 2022-02-20
Payer: MEDICARE

## 2022-02-16 VITALS
SYSTOLIC BLOOD PRESSURE: 124 MMHG | WEIGHT: 266.1 LBS | TEMPERATURE: 97.7 F | OXYGEN SATURATION: 91 % | BODY MASS INDEX: 41.76 KG/M2 | RESPIRATION RATE: 18 BRPM | DIASTOLIC BLOOD PRESSURE: 77 MMHG | HEIGHT: 67 IN | HEART RATE: 111 BPM

## 2022-02-16 LAB
ANION GAP SERPL CALCULATED.3IONS-SCNC: 13 MMOL/L (ref 9–17)
BUN BLDV-MCNC: 15 MG/DL (ref 8–23)
CHLORIDE BLD-SCNC: 98 MMOL/L (ref 98–107)
CO2: 27 MMOL/L (ref 20–31)
CREAT SERPL-MCNC: 1.17 MG/DL (ref 0.5–0.9)
GFR AFRICAN AMERICAN: 55 ML/MIN
GFR NON-AFRICAN AMERICAN: 45 ML/MIN
GFR SERPL CREATININE-BSD FRML MDRD: ABNORMAL ML/MIN/{1.73_M2}
GFR SERPL CREATININE-BSD FRML MDRD: ABNORMAL ML/MIN/{1.73_M2}
HCT VFR BLD CALC: 47.7 % (ref 36.3–47.1)
HEMOGLOBIN: 13.8 G/DL (ref 11.9–15.1)
MCH RBC QN AUTO: 24.2 PG (ref 25.2–33.5)
MCHC RBC AUTO-ENTMCNC: 28.9 G/DL (ref 28.4–34.8)
MCV RBC AUTO: 83.5 FL (ref 82.6–102.9)
NRBC AUTOMATED: 0 PER 100 WBC
PDW BLD-RTO: 17.5 % (ref 11.8–14.4)
PLATELET # BLD: 454 K/UL (ref 138–453)
PMV BLD AUTO: 9.2 FL (ref 8.1–13.5)
POTASSIUM SERPL-SCNC: 4.1 MMOL/L (ref 3.7–5.3)
RBC # BLD: 5.71 M/UL (ref 3.95–5.11)
SODIUM BLD-SCNC: 138 MMOL/L (ref 135–144)
WBC # BLD: 15.4 K/UL (ref 3.5–11.3)

## 2022-02-16 PROCEDURE — 82565 ASSAY OF CREATININE: CPT

## 2022-02-16 PROCEDURE — 84520 ASSAY OF UREA NITROGEN: CPT

## 2022-02-16 PROCEDURE — 93005 ELECTROCARDIOGRAM TRACING: CPT | Performed by: ANESTHESIOLOGY

## 2022-02-16 PROCEDURE — 80051 ELECTROLYTE PANEL: CPT

## 2022-02-16 PROCEDURE — 71046 X-RAY EXAM CHEST 2 VIEWS: CPT

## 2022-02-16 PROCEDURE — 85027 COMPLETE CBC AUTOMATED: CPT

## 2022-02-16 PROCEDURE — 36415 COLL VENOUS BLD VENIPUNCTURE: CPT

## 2022-02-16 RX ORDER — IBUPROFEN 200 MG
400 TABLET ORAL AS NEEDED
COMMUNITY

## 2022-02-16 RX ORDER — UBIDECARENONE 75 MG
50 CAPSULE ORAL DAILY
COMMUNITY
End: 2022-08-23

## 2022-02-16 ASSESSMENT — PAIN DESCRIPTION - PAIN TYPE: TYPE: CHRONIC PAIN

## 2022-02-16 ASSESSMENT — PAIN DESCRIPTION - DESCRIPTORS: DESCRIPTORS: PATIENT UNABLE TO DESCRIBE

## 2022-02-16 ASSESSMENT — PAIN DESCRIPTION - ORIENTATION: ORIENTATION: LOWER

## 2022-02-16 ASSESSMENT — PAIN SCALES - GENERAL: PAINLEVEL_OUTOF10: 8

## 2022-02-16 ASSESSMENT — PAIN DESCRIPTION - LOCATION: LOCATION: BACK

## 2022-02-16 ASSESSMENT — PAIN DESCRIPTION - FREQUENCY: FREQUENCY: CONTINUOUS

## 2022-02-16 NOTE — PRE-PROCEDURE INSTRUCTIONS
[address] [place] ON Monday, 2/21/2022 at 0600 AM    Once you enter the hospital lobby, take the elevators to the second floor. Check-In is at the surgery registration desk. DUE TO COVID-19 RESTRICTIONS WE ALLOW 1 VISITOR, OVER 25YEARS OF AGE IN THE SURGERY WAITING ROOM    Continue to take your home medications as you normally do up to and including the night before surgery with the exception of any blood thinning medications. Please stop any blood thinning medications as directed by your surgeon or prescribing physician. Failure to stop certain medications may interfere with your scheduled surgery. These may include:  Aspirin, Warfarin (Coumadin), Clopidogrel (Plavix), Ibuprofen (Motrin, Advil), Naproxen (Aleve), Meloxicam (Mobic), Celecoxib (Celebrex), Eliquis, Pradaxa, Xarelto, Effient, Fish Oil, Herbal supplements. If you are diabetic, do not take any of your diabetic medications by mouth the morning of surgery. If you are taking insulin contact the doctor that manages your diabetes for instructions about any changes to your insulin dosages the day before surgery. Do not inject insulin or other injectable diabetic medications the morning of surgery unless otherwise instructed by the doctor who manages your diabetes. Please take the following medication(s) the day of surgery with a small sip of water:  OMEPRAZOLE, LEVOTHYROXINE, ANTI-DEPRESSANT    Please use your inhalers at home the day of surgery. PREPARING FOR YOUR SURGERY:     Before surgery, you can play an important role in your own health. Because skin is not sterile, we need to be sure that your skin is as free of germs as possible before surgery by carefully washing before surgery. Preparing or prepping skin before surgery can reduce the risk of a surgical site infection.   Do not shave the area of your body where your surgery will be performed unless you received specific permission from your physician.     You will need to shower at home the night before surgery and the morning of surgery with a special soap called chlorhexidine gluconate (CHG*). *Not to be used by people allergic to Chlorhexidine Gluconate (CHG). Following these instructions will help you be sure that your skin is clean before surgery. Instructions on cleaning your skin before surgery: The night before your surgery:      You will need to shower with warm water (not hot) and the CHG soap.  Use a clean wash cloth and a clean towel. Have clean clothes available to put on after the shower.    First wash your hair with regular shampoo. Rinse your hair and body thoroughly to remove the shampoo.  Wash your face and genital area (private parts) with your regular soap or water only. Thoroughly rinse your body with warm water from the neck down.  Turn water off to prevent rinsing the soap off too soon.  With a clean wet washcloth and half of the CHG soap in the bottle, lather your entire body from the neck down. Do not use CHG soap near your eyes or ears to avoid injury to those areas.  Wash thoroughly, paying special attention to the area where your surgery will be performed.  Wash your body gently for five (5) minutes. Avoid scrubbing your skin too hard.  Turn the water back on and rinse your body thoroughly.  Pat yourself dry with a clean, soft towel. Do not apply lotion, cream or powder.  Dress with clean freshly washed clothes. The morning of surgery:     Repeat shower following steps above - using remaining half of CHG soap in bottle. Patient Instructions:    Eulalia Neither If you are having any type of anesthesia you are to have nothing to eat or drink after midnight the night before your surgery.   This includes gum, hard candy, mints, water or smoking or chewing tobacco.  The only exception to this is a small sip of water to take with any morning dose of heart, blood pressure, or seizure medications. No alcoholic beverages for 24 hours prior to surgery.  Brush your teeth but do not swallow water.  Bring your eyeglasses and case with you. No contacts are to be worn the day of surgery. You also may bring your hearing aids. Most surgical procedures involving anesthesia will require that you remove your dentures prior to surgery.  If you are on C-PAP or Bi-PAP at home and plan on staying in the hospital overnight for your surgery please bring the machine with you. · Do not wear any jewelry or body piercings day of surgery. Also, NO lotion, perfume or deodorant to be used the day of surgery. No nail polish on the operative extremity (arm/leg surgeries)    · Do not bring any valuables such as jewelry, cash, or credit cards. If you are staying overnight with us, please bring a small bag of personal items.  Please wear loose, comfortable clothing. If you are potentially going to have a cast or brace bring clothing that will fit over them.  In case of illness - If you have cold or flu like symptoms (high fever, runny nose, sore throat, cough, etc.) rash, nausea, vomiting, loose stools, and/or recent contact with someone who has a contagious disease (chicken pox, measles, etc.) Please call your doctor before coming to the hospital.     If your child is having surgery please make arrangements for any other children to be cared for at home on the day of surgery. Other children are not permitted in recovery room and we want you to be able to spend time with the patient. If other arrangements are not available then we suggest that you have a second adult to stay in the waiting room. Day of Surgery/Procedure:    As a patient at [facility] you can expect quality medical and nursing care that is centered on your individual needs.   Our goal is to make your surgical experience as comfortable as possible    . Transportation After Your Surgery/Procedure: You will need a friend or family member to drive you home after your procedure. Your  must be 25years of age or older and able to sign off on your discharge instructions. A taxi cab or any other form of public transportation is not acceptable. Your friend or family member must stay at the hospital throughout your procedure. Someone must remain with you for the first 24 hours after your surgery if you receive anesthesia or medication. If you do not have someone to stay with you, your procedure may be cancelled.       If you have any other questions regarding your procedure or the day of surgery, please call [phone]      _________________________  ____________________________  Signature (Patient)              Signature (Provider) & date

## 2022-02-16 NOTE — PROGRESS NOTES
PAT Progress Note    Pt Name: Rohit David  MRN: [id number]  YOB: 1950  Date of evaluation: 2/16/2022      [x] Called to PAT. I spoke to the patient, Rohit David, a 66 y.o. female, who is scheduled for an upcoming basivertebral nerve nerve radiofrequency ablation intracept procedure at L4/L5/S1 by Dr. Chelsie Sarabia for vertebrogenic low back pain on 2/21/2022 at 0730. [x] I reviewed in epic the pain management progress note by Dr. Chelsie Sarabia dated 2/1/2022 for an Interval History and Physical Note the day of surgery. History of brain aneurysm (requiring craniotomy and clipping - 2002), hypertension, stroke (2002), thyroid disease, leukocytosis, thrombocytosis. Patient does not follow with neurology. Patient has occasional \"pain in her head. \" Patient states she is \"tired\" when walking but denies shortness of breath. . Patient states she has midsternal pain that occurs intermittently. Patient states she has a hiatal hernia. Patient has dizziness - \"when I don't sleep or I'm nervous. \" Denies palpitations. History of cardiac catheterization with no stents \"many years ago. \"      Functional Capacity per pt:  1) Pt is not able to walk 2 city blocks on level ground due to severe back pain. 2) Pt is not able to climb 2 flights of stairs due to severe back pain. 3) Pt is not able to walk up a hill for 1-2 city blocks due to severe back pain. Patient states walking from her apartment to her car makes her very \"tired. \"     Vital signs: /77   Pulse 111   Temp 97.7 °F (36.5 °C)   Resp 18   Ht 5' 7\" (1.702 m)   Wt 266 lb 1.6 oz (120.7 kg)   SpO2 91%   BMI 41.68 kg/m²     Physical Exam:     General Appearance:  Alert, well appearing, and in no acute distress. Morbidly obese. Mental status: Oriented to person, place, and time. Lungs: Diminished to auscultation. Bilateral equal air entry, no wheezing, rales or rhonchi, and normal effort.   Cardiovascular: Asymptomatic tachycardic rate, regular rhythm, no murmur, gallop, or rub. Abdomen: Soft, obese, nontender, nondistended, and active bowel sounds.     Investigations:      Narrative   EXAMINATION:   TWO XRAY VIEWS OF THE CHEST       2/16/2022 11:28 am       COMPARISON:   None.       HISTORY:   ORDERING SYSTEM PROVIDED HISTORY: O2 91%, smoker, productive cough   TECHNOLOGIST PROVIDED HISTORY:   O2 91%, smoker, productive cough   Reason for Exam: Pre op back surgery       History of hypertension, obesity, and stroke.  25 pack-year smoking history.       FINDINGS:   Cardiac silhouette upper limits of normal in size.  Mediastinal structures   midline; elongation/ectasia thoracic aorta, mild calcification knob.       Mildly hyperinflated lungs with interstitial prominence, more mid lower zones   with likely atelectasis/scarring.  No consolidation or sizable pleural   effusion.  No clear-cut GGO.  No pneumothorax.       Mild convex-right curvature and kyphoscoliosis thoracic spine with   intervertebral disc space narrowing and mild degenerative findings.           Impression   Probable COPD with chronic interstitial change/bronchitis.  No consolidation   or sizable pleural effusion.  Probable atelectasis or scarring lung bases.               Laboratory Testing:  Recent Results (from the past 24 hour(s))   CBC    Collection Time: 02/16/22 10:36 AM   Result Value Ref Range    WBC 15.4 (H) 3.5 - 11.3 k/uL    RBC 5.71 (H) 3.95 - 5.11 m/uL    Hemoglobin 13.8 11.9 - 15.1 g/dL    Hematocrit 47.7 (H) 36.3 - 47.1 %    MCV 83.5 82.6 - 102.9 fL    MCH 24.2 (L) 25.2 - 33.5 pg    MCHC 28.9 28.4 - 34.8 g/dL    RDW 17.5 (H) 11.8 - 14.4 %    Platelets 121 (H) 682 - 453 k/uL    MPV 9.2 8.1 - 13.5 fL    NRBC Automated 0.0 0.0 per 100 WBC   BUN & Creatinine    Collection Time: 02/16/22 10:36 AM   Result Value Ref Range    BUN 15 8 - 23 mg/dL    CREATININE 1.17 (H) 0.50 - 0.90 mg/dL    GFR Non-African American 45 (L) >60 mL/min    GFR  55 (L) >60 mL/min    GFR Comment GFR Staging NOT REPORTED    Electrolyte Panel    Collection Time: 02/16/22 10:36 AM   Result Value Ref Range    Sodium 138 135 - 144 mmol/L    Potassium 4.1 3.7 - 5.3 mmol/L    Chloride 98 98 - 107 mmol/L    CO2 27 20 - 31 mmol/L    Anion Gap 13 9 - 17 mmol/L   EKG 12 Lead    Collection Time: 02/16/22 10:55 AM   Result Value Ref Range    Ventricular Rate 97 BPM    Atrial Rate 97 BPM    P-R Interval 166 ms    QRS Duration 80 ms    Q-T Interval 356 ms    QTc Calculation (Bazett) 452 ms    P Axis 63 degrees    R Axis -67 degrees    T Axis 54 degrees       Recent Labs     02/16/22  1036   HGB 13.8   HCT 47.7*   WBC 15.4*   MCV 83.5      K 4.1   CL 98   CO2 27   BUN 15   CREATININE 1.17*       No results for input(s): COVID19 in the last 720 hours.     FABI Bruner - CNP    Electronically signed 2/16/2022 at 3:07 PM

## 2022-02-17 ENCOUNTER — TELEPHONE (OUTPATIENT)
Dept: PAIN MANAGEMENT | Age: 72
End: 2022-02-17

## 2022-02-17 DIAGNOSIS — J98.4 CHRONIC LUNG DISEASE: Primary | ICD-10-CM

## 2022-02-17 LAB
EKG ATRIAL RATE: 97 BPM
EKG P AXIS: 63 DEGREES
EKG P-R INTERVAL: 166 MS
EKG Q-T INTERVAL: 356 MS
EKG QRS DURATION: 80 MS
EKG QTC CALCULATION (BAZETT): 452 MS
EKG R AXIS: -67 DEGREES
EKG T AXIS: 54 DEGREES
EKG VENTRICULAR RATE: 97 BPM

## 2022-02-17 PROCEDURE — 93010 ELECTROCARDIOGRAM REPORT: CPT | Performed by: INTERNAL MEDICINE

## 2022-02-17 NOTE — TELEPHONE ENCOUNTER
I called pt this morning @ 1034am to ask her who her Pulmonary Doc is and I need the phone number and fax to that office thanks    Pt called back she dose not have Pulmonary doc and before she can have her Intracept done on 02/21/2022 in the morning @ [facility], Per Prior Auth/ pre testing department states she is in need of getting clearance from pulmonary her o2 level was between 88 - 91 thank

## 2022-02-17 NOTE — TELEPHONE ENCOUNTER
Can you please call pcp office and request clearance and   I ordered referral to pulmonology, please help her scxheudle asap

## 2022-03-14 ENCOUNTER — TELEPHONE (OUTPATIENT)
Dept: PAIN MANAGEMENT | Age: 72
End: 2022-03-14

## 2022-03-14 NOTE — TELEPHONE ENCOUNTER
I called PCP Mina Calvillo had to leave a message they are not open Spoke to pt and the pulmonary has not called her yet for an appointment so I called dr Michi Cody office and asked them to get her in right away

## 2022-03-16 ENCOUNTER — TELEPHONE (OUTPATIENT)
Dept: PAIN MANAGEMENT | Age: 72
End: 2022-03-16

## 2022-03-16 NOTE — TELEPHONE ENCOUNTER
Called [facility] again to make appointment with pt per pt they did finally call in on 03/30/2022

## 2022-03-16 NOTE — TELEPHONE ENCOUNTER
----- Message from Yaw Drew MD sent at 3/11/2022  9:02 AM EST -----  Please call pcp office to follow up on medical clearance and pulmonology evaluation requested prior to proceeding with her intracept procedure  This request was made 1 month ago

## 2022-03-23 PROBLEM — D47.3 ESSENTIAL (HEMORRHAGIC) THROMBOCYTHEMIA (HCC): Status: ACTIVE | Noted: 2022-03-23

## 2022-04-01 ENCOUNTER — TELEPHONE (OUTPATIENT)
Dept: PAIN MANAGEMENT | Age: 72
End: 2022-04-01

## 2022-04-01 NOTE — TELEPHONE ENCOUNTER
No clearance yet she has to do more test and once she has a clear answer she will call us back to let us know and to schedule intracept

## 2022-04-29 ENCOUNTER — HOSPITAL ENCOUNTER (OUTPATIENT)
Age: 72
Discharge: HOME | End: 2022-04-29
Payer: MEDICARE

## 2022-04-29 DIAGNOSIS — E53.8 LOW SERUM VITAMIN B12: ICD-10-CM

## 2022-04-29 DIAGNOSIS — D75.839 THROMBOCYTOSIS: ICD-10-CM

## 2022-04-29 DIAGNOSIS — D47.2 MGUS (MONOCLONAL GAMMOPATHY OF UNKNOWN SIGNIFICANCE): ICD-10-CM

## 2022-04-29 DIAGNOSIS — D72.823 LEUKEMOID REACTION: ICD-10-CM

## 2022-04-29 LAB
ABSOLUTE EOS #: 0.41 K/UL (ref 0–0.44)
ABSOLUTE IMMATURE GRANULOCYTE: 0.05 K/UL (ref 0–0.3)
ABSOLUTE LYMPH #: 2.74 K/UL (ref 1.1–3.7)
ABSOLUTE MONO #: 1.01 K/UL (ref 0.1–1.2)
ALBUMIN SERPL-MCNC: 3.9 G/DL (ref 3.5–5.2)
ALBUMIN/GLOBULIN RATIO: 0.8 (ref 1–2.5)
ALP BLD-CCNC: 115 U/L (ref 35–104)
ALT SERPL-CCNC: 23 U/L (ref 5–33)
ANION GAP SERPL CALCULATED.3IONS-SCNC: 12 MMOL/L (ref 9–17)
AST SERPL-CCNC: 24 U/L
BASOPHILS # BLD: 1 % (ref 0–2)
BASOPHILS ABSOLUTE: 0.06 K/UL (ref 0–0.2)
BILIRUB SERPL-MCNC: 0.33 MG/DL (ref 0.3–1.2)
BUN BLDV-MCNC: 16 MG/DL (ref 8–23)
CALCIUM SERPL-MCNC: 9.8 MG/DL (ref 8.6–10.4)
CHLORIDE BLD-SCNC: 99 MMOL/L (ref 98–107)
CO2: 27 MMOL/L (ref 20–31)
CREAT SERPL-MCNC: 1.18 MG/DL (ref 0.5–0.9)
EOSINOPHILS RELATIVE PERCENT: 4 % (ref 1–4)
FOLATE: 4 NG/ML
FREE KAPPA/LAMBDA RATIO: 1.32 (ref 0.26–1.65)
GFR AFRICAN AMERICAN: 55 ML/MIN
GFR NON-AFRICAN AMERICAN: 45 ML/MIN
GFR SERPL CREATININE-BSD FRML MDRD: ABNORMAL ML/MIN/{1.73_M2}
GLUCOSE BLD-MCNC: 98 MG/DL (ref 70–99)
HCT VFR BLD CALC: 46.4 % (ref 36.3–47.1)
HEMOGLOBIN: 13.6 G/DL (ref 11.9–15.1)
IMMATURE GRANULOCYTES: 1 %
KAPPA FREE LIGHT CHAINS QNT: 8.17 MG/DL (ref 0.37–1.94)
LAMBDA FREE LIGHT CHAINS QNT: 6.19 MG/DL (ref 0.57–2.63)
LYMPHOCYTES # BLD: 26 % (ref 24–43)
MCH RBC QN AUTO: 24.6 PG (ref 25.2–33.5)
MCHC RBC AUTO-ENTMCNC: 29.3 G/DL (ref 28.4–34.8)
MCV RBC AUTO: 83.9 FL (ref 82.6–102.9)
MONOCYTES # BLD: 10 % (ref 3–12)
NRBC AUTOMATED: 0 PER 100 WBC
PDW BLD-RTO: 17.2 % (ref 11.8–14.4)
PLATELET # BLD: 453 K/UL (ref 138–453)
PMV BLD AUTO: 10.2 FL (ref 8.1–13.5)
POTASSIUM SERPL-SCNC: 4 MMOL/L (ref 3.7–5.3)
RBC # BLD: 5.53 M/UL (ref 3.95–5.11)
RBC # BLD: ABNORMAL 10*6/UL
SEG NEUTROPHILS: 58 % (ref 36–65)
SEGMENTED NEUTROPHILS ABSOLUTE COUNT: 6.34 K/UL (ref 1.5–8.1)
SODIUM BLD-SCNC: 138 MMOL/L (ref 135–144)
TOTAL PROTEIN: 8.6 G/DL (ref 6.4–8.3)
VITAMIN B-12: >2000 PG/ML (ref 232–1245)
WBC # BLD: 10.6 K/UL (ref 3.5–11.3)

## 2022-04-29 PROCEDURE — 80053 COMPREHEN METABOLIC PANEL: CPT

## 2022-04-29 PROCEDURE — 36415 COLL VENOUS BLD VENIPUNCTURE: CPT

## 2022-04-29 PROCEDURE — 82607 VITAMIN B-12: CPT

## 2022-04-29 PROCEDURE — 85025 COMPLETE CBC W/AUTO DIFF WBC: CPT

## 2022-04-29 PROCEDURE — 82746 ASSAY OF FOLIC ACID SERUM: CPT

## 2022-04-29 PROCEDURE — 83883 ASSAY NEPHELOMETRY NOT SPEC: CPT

## 2022-04-29 PROCEDURE — 84155 ASSAY OF PROTEIN SERUM: CPT

## 2022-04-29 PROCEDURE — 84165 PROTEIN E-PHORESIS SERUM: CPT

## 2022-05-02 LAB
ALBUMIN (CALCULATED): 3.8 G/DL (ref 3.2–5.2)
ALBUMIN PERCENT: 45 % (ref 45–65)
ALPHA 1 PERCENT: 4 % (ref 3–6)
ALPHA 2 PERCENT: 11 % (ref 6–13)
ALPHA-1-GLOBULIN: 0.4 G/DL (ref 0.1–0.4)
ALPHA-2-GLOBULIN: 0.9 G/DL (ref 0.5–0.9)
BETA GLOBULIN: 0.9 G/DL (ref 0.5–1.1)
BETA PERCENT: 11 % (ref 11–19)
GAMMA GLOBULIN %: 30 % (ref 9–20)
GAMMA GLOBULIN: 2.6 G/DL (ref 0.5–1.5)
PATHOLOGIST: ABNORMAL
PROTEIN ELECTROPHORESIS, SERUM: ABNORMAL
TOTAL PROT. SUM,%: 101 % (ref 98–102)
TOTAL PROT. SUM: 8.6 G/DL (ref 6.3–8.2)
TOTAL PROTEIN: 8.6 G/DL (ref 6.4–8.3)

## 2022-05-05 ENCOUNTER — HOSPITAL ENCOUNTER (OUTPATIENT)
Facility: MEDICAL CENTER | Age: 72
End: 2022-05-05

## 2022-05-06 ENCOUNTER — TELEPHONE (OUTPATIENT)
Dept: ONCOLOGY | Age: 72
End: 2022-05-06

## 2022-05-06 ENCOUNTER — OFFICE VISIT (OUTPATIENT)
Dept: ONCOLOGY | Age: 72
End: 2022-05-06
Payer: MEDICARE

## 2022-05-06 VITALS
WEIGHT: 279.3 LBS | SYSTOLIC BLOOD PRESSURE: 159 MMHG | HEART RATE: 87 BPM | TEMPERATURE: 97.3 F | RESPIRATION RATE: 18 BRPM | BODY MASS INDEX: 43.74 KG/M2 | DIASTOLIC BLOOD PRESSURE: 92 MMHG

## 2022-05-06 DIAGNOSIS — D47.2 MGUS (MONOCLONAL GAMMOPATHY OF UNKNOWN SIGNIFICANCE): ICD-10-CM

## 2022-05-06 DIAGNOSIS — N18.31 STAGE 3A CHRONIC KIDNEY DISEASE (HCC): ICD-10-CM

## 2022-05-06 DIAGNOSIS — E53.8 LOW SERUM VITAMIN B12: ICD-10-CM

## 2022-05-06 DIAGNOSIS — D72.823 LEUKEMOID REACTION: Primary | ICD-10-CM

## 2022-05-06 PROBLEM — N18.30 CHRONIC RENAL DISEASE, STAGE III (HCC): Status: ACTIVE | Noted: 2022-05-06

## 2022-05-06 PROCEDURE — 1123F ACP DISCUSS/DSCN MKR DOCD: CPT | Performed by: INTERNAL MEDICINE

## 2022-05-06 PROCEDURE — 1090F PRES/ABSN URINE INCON ASSESS: CPT | Performed by: INTERNAL MEDICINE

## 2022-05-06 PROCEDURE — 99214 OFFICE O/P EST MOD 30 MIN: CPT | Performed by: INTERNAL MEDICINE

## 2022-05-06 PROCEDURE — 3017F COLORECTAL CA SCREEN DOC REV: CPT | Performed by: INTERNAL MEDICINE

## 2022-05-06 PROCEDURE — G8399 PT W/DXA RESULTS DOCUMENT: HCPCS | Performed by: INTERNAL MEDICINE

## 2022-05-06 PROCEDURE — 4040F PNEUMOC VAC/ADMIN/RCVD: CPT | Performed by: INTERNAL MEDICINE

## 2022-05-06 PROCEDURE — 99211 OFF/OP EST MAY X REQ PHY/QHP: CPT

## 2022-05-06 PROCEDURE — G8428 CUR MEDS NOT DOCUMENT: HCPCS | Performed by: INTERNAL MEDICINE

## 2022-05-06 PROCEDURE — 4004F PT TOBACCO SCREEN RCVD TLK: CPT | Performed by: INTERNAL MEDICINE

## 2022-05-06 PROCEDURE — G8417 CALC BMI ABV UP PARAM F/U: HCPCS | Performed by: INTERNAL MEDICINE

## 2022-05-06 NOTE — TELEPHONE ENCOUNTER
Adam Estrella MD VISIT  RV 6 MTHS   NEED CDP CMP LT CHAINS SPEP PRIOR TO RV  LABS CDP CMP SPEP KAPPA LAMBDA FREE LIGHT CHAINS 11/4/22 ORDERS GIVEN TO PT ON EXIT  MD VISIT 11/11/22 @ 11AM  AVS PRINTED W/ INSTRUCTIONS AND GIVEN TO PT ON EXIT

## 2022-05-06 NOTE — PROGRESS NOTES
DIAGNOSIS:   1. Leukocytosis, thrombocytosis, likely leukemoid reaction  2. MGUS  3. Low B12    CURRENT THERAPY:  Monthly B12  Observation for MGUS   BRIEF CASE HISTORY:   Luciano Purcell is a very pleasant 66 y.o. female who is referred to us for leukocytosis. She has history of spinal damage and back pain, she has had several procedures. She has history of hypertension and hypothyroidism. Her sister and aunt had breast, sister had leukemia. The patient has history of cysts but no malignancy, her last mammogram was 12/2020 with negative results. She smokes a pack every 3 days. Cancer screening, CT of the chest was done in 2019 and was essentially negative. Mammogram December/2020 that was negative. Last colonoscopy was also in 2019 and showed multiple adenomatous polyps but no cancer. Work-up for the patient showed negative JAK2 mutation and negative BCR ABL, she had small MGUS under 1 g. She had normal kidney function and normal hemoglobin and no hypercalcemia. We decided to observe. INTERIM HISTORY: The patient presents for follow up today to review work up with MGUS surveillance. She reports she is well and stable with no nausea, vomiting, weight loss, fever, chills. She is taking B12 as directed. She continues to smoke. She follows with pain management for her back and surgery is being considered. She continues to have some headaches. PAST MEDICAL HISTORY: has a past medical history of Alkaline phosphatase elevation, Arthritis, Brain aneurysm, Bronchitis, Dyspepsia, Exercise counseling, Frequent urination, Headache(784.0), Hypertension, Leukocytosis, Major depressive disorder, Obesity, Stroke ([address] Thyroid disease, and Viral upper respiratory tract infection. PAST SURGICAL HISTORY: has a past surgical history that includes brain surgery; Tubal ligation; Hysterectomy; ovarian cyst removal; cyst removal; Appendectomy; Colonoscopy; and Cardiac surgery.      CURRENT MEDICATIONS:  has a current medication list which includes the following prescription(s): losartan-hydrochlorothiazide, levothyroxine, omeprazole, venlafaxine, vitamin b-12, ibuprofen, dorzolamide-timolol, vitamin d, aspirin, liothyronine, and blood pressure. ALLERGIES:  is allergic to aspirin, baclofen, and wellbutrin [bupropion]. FAMILY HISTORY: Sister and aunt: breast cancer; sister: leukemia     SOCIAL HISTORY:  reports that she has been smoking cigarettes. She has a 25.00 pack-year smoking history. She has never used smokeless tobacco. She reports that she does not drink alcohol and does not use drugs. REVIEW OF SYSTEMS:   General: No fever or night sweats. Weight is stable. ENT: No double or blurred vision, no tinnitus or hearing problem, no dysphagia or sore throat   Respiratory: No chest pain, no shortness of breath, no cough or hemoptysis. Cardiovascular: Denies chest pain, PND or orthopnea. No L E swelling or palpitations. Gastrointestinal: No nausea or vomiting, abdominal pain, diarrhea or constipation. Genitourinary: Denies dysuria, hematuria, frequency, urgency or incontinence. Neurological: Denies headaches, decreased LOC, no sensory or motor focal deficits. +pain around brain shunt  Musculoskeletal: No arthralgia or joint swelling. +chronic back pain  Skin: There are no rashes or bleeding. Psychiatric: No anxiety, no depression. Endocrine: No diabetes or thyroid disease. Hematologic: No bleeding, no adenopathy. PHYSICAL EXAM: Shows a well appearing 66y.o.-year-old female who is not in pain or distress. Vital Signs: Blood pressure (!) 159/92, pulse 87, temperature 97.3 °F (36.3 °C), temperature source Temporal, resp. rate 18, weight 279 lb 4.8 oz (126.7 kg). HEENT: Normocephalic and atraumatic. Pupils are equal, round, reactive to light and accommodation. Extraocular muscles are intact. Neck: Showed no JVD, no carotid bruit . Lungs: Clear to auscultation bilaterally.  Heart: Regular without any murmur. Abdomen: Soft, nontender. No hepatosplenomegaly. Extremities: Lower extremities show bilateral trace edema improved. No clubbing, or cyanosis. Breasts: Examination not done today. Neuro exam: intact cranial nerves bilaterally no motor or sensory deficit, gait is normal. Lymphatic: no adenopathy appreciated in the supraclavicular, axillary, cervical or inguinal area    REVIEW OF LABORATORY DATA:   Lab Results   Component Value Date    WBC 10.6 04/29/2022    HGB 13.6 04/29/2022    HCT 46.4 04/29/2022    MCV 83.9 04/29/2022     04/29/2022       Chemistry        Component Value Date/Time     04/29/2022 1338    K 4.0 04/29/2022 1338    CL 99 04/29/2022 1338    CO2 27 04/29/2022 1338    BUN 16 04/29/2022 1338    CREATININE 1.18 (H) 04/29/2022 1338        Component Value Date/Time    CALCIUM 9.8 04/29/2022 1338    ALKPHOS 115 (H) 04/29/2022 1338    AST 24 04/29/2022 1338    ALT 23 04/29/2022 1338    BILITOT 0.33 04/29/2022 1338        SPEP 04/2022  IgG, Lambda. THE APPROXIMATE DENSITOMETRIC QUANTITATION OF PARAPROTEIN IS 0.96 G/DL. REVIEW OF RADIOLOGICAL RESULTS:     IMPRESSION:   1. Leukocytosis  2. HX hypertensin and hypothyroidism   3. Low B12 - plan for supplementation  4. Small MGUS without any signs of myeloma  5. Smoking addiction - no plan to quit    PLAN:   1. We reviewed her lab work, her WBC shows improvement with B12 and her SPEP is stable. 2. She is doing well with no symptoms and I reviewed her diagnosis. 3. We will continue with surveillance and B12 supplementation unchanged. 4. We reviewed her CT of the head from a year ago, no change in plate or bleeding. 5. We discussed follow up with pain management. 6. I counseled her on smoking cessation, she refuses to quit and feels it is managing her pain. 7. Return in 6 months with repeat lab work.      Scribe Attestation   This note was created by Anatoly Otero acting as scribe for the physician signing this note  Electronically Signed  Liban Estrada, 5/6/2022  Scribe, [employer] Attending Attestation   Note was reviewed and edited.   I am in agreement with the note as entered    Anneliese Man MD  Hematologist/Medical [id number] [facility] hematology oncology physicians

## 2022-05-26 ENCOUNTER — HOSPITAL ENCOUNTER (OUTPATIENT)
Dept: NEUROLOGY | Age: 72
Discharge: HOME | End: 2022-05-26
Payer: MEDICARE

## 2022-05-26 DIAGNOSIS — R06.02 SHORTNESS OF BREATH: ICD-10-CM

## 2022-05-26 PROCEDURE — 94729 DIFFUSING CAPACITY: CPT

## 2022-05-26 PROCEDURE — 6370000000 HC RX 637 (ALT 250 FOR IP): Performed by: INTERNAL MEDICINE

## 2022-05-26 PROCEDURE — 94726 PLETHYSMOGRAPHY LUNG VOLUMES: CPT

## 2022-05-26 PROCEDURE — 94060 EVALUATION OF WHEEZING: CPT

## 2022-05-26 RX ORDER — ALBUTEROL SULFATE 90 UG/1
2 AEROSOL, METERED RESPIRATORY (INHALATION) ONCE
Status: COMPLETED | OUTPATIENT
Start: 2022-05-26 | End: 2022-05-26

## 2022-05-26 RX ADMIN — ALBUTEROL SULFATE 2 PUFF: 90 AEROSOL, METERED RESPIRATORY (INHALATION) at 15:21

## 2022-05-31 LAB
DLCO %PRED: 37 %
DLCO PRED: NORMAL
DLCO/VA %PRED: NORMAL
DLCO/VA PRED: NORMAL
DLCO/VA: NORMAL
DLCO: NORMAL
EXPIRATORY TIME-POST: NORMAL
EXPIRATORY TIME: NORMAL
FEF 25-75% %CHNG: NORMAL
FEF 25-75% %PRED-POST: NORMAL
FEF 25-75% %PRED-PRE: NORMAL
FEF 25-75% PRED: NORMAL
FEF 25-75%-POST: NORMAL
FEF 25-75%-PRE: NORMAL
FEV1 %PRED-POST: 82 %
FEV1 %PRED-PRE: 82 %
FEV1 PRED: NORMAL
FEV1-POST: NORMAL
FEV1-PRE: NORMAL
FEV1/FVC %PRED-POST: NORMAL
FEV1/FVC %PRED-PRE: NORMAL
FEV1/FVC PRED: NORMAL
FEV1/FVC-POST: 82 %
FEV1/FVC-PRE: 89 %
FVC %PRED-POST: NORMAL
FVC %PRED-PRE: NORMAL
FVC PRED: NORMAL
FVC-POST: NORMAL
FVC-PRE: NORMAL
GAW %PRED: NORMAL
GAW PRED: NORMAL
GAW: NORMAL
IC %PRED: NORMAL
IC PRED: NORMAL
IC: NORMAL
MEP: NORMAL
MIP: NORMAL
MVV %PRED-PRE: NORMAL
MVV PRED: NORMAL
MVV-PRE: NORMAL
PEF %PRED-POST: NORMAL
PEF %PRED-PRE: NORMAL
PEF PRED: NORMAL
PEF%CHNG: NORMAL
PEF-POST: NORMAL
PEF-PRE: NORMAL
RAW %PRED: NORMAL
RAW PRED: NORMAL
RAW: NORMAL
RV %PRED: NORMAL
RV PRED: NORMAL
RV: NORMAL
SVC %PRED: NORMAL
SVC PRED: NORMAL
SVC: NORMAL
TLC %PRED: 82 %
TLC PRED: NORMAL
TLC: NORMAL
VA %PRED: NORMAL
VA PRED: NORMAL
VA: NORMAL
VTG %PRED: NORMAL
VTG PRED: NORMAL
VTG: NORMAL

## 2022-05-31 ASSESSMENT — PULMONARY FUNCTION TESTS
FEV1/FVC_PRE: 89
FEV1_PERCENT_PREDICTED_PRE: 82
FEV1_PERCENT_PREDICTED_POST: 82
FEV1/FVC_POST: 82

## 2022-06-01 NOTE — PROCEDURES
[id number] [facility] [address]                [address] [place] [address]                               PULMONARY FUNCTION    PATIENT NAME: Guzman Dear                   :        1950  MED REC NO:   [id number]                             ROOM:  ACCOUNT NO:   [de-identified]                           ADMIT DATE: 2022  PROVIDER:     Byron Cuadra    DATE OF PROCEDURE:  2022    INTERPRETATION:  On review of the patient's spirometry, the patient's  forced vital capacity is 1.94 liters which is 72% predicted and FEV1  1.72 liters which is 82% predicted with an FEV1 to FVC ratio of 89 which  is 113% predicted. The patient's MNO49-31 was 2.32 liters which is 135%  predicted. No significant bronchodilator change was noted. On review  of the patient's lung volumes, the patient's total lung capacity is 4.62  liters which is 82% predicted and residual volume to be 2.71 liters  which is 112% predicted. The patient's DLCO is 10.92 which is 37%  predicted. The patient's flow volume loop was reviewed. IMPRESSION:  1. No significant obstructive airway disease. 2.  Lung volumes and DLCO are suggestive of emphysematous changes of the  lung. Clinical correlation is recommended.         Juju Salas    D: 2022 17:24:59       T: 2022 17:28:35     [email or web address]  Job#: [id number]     Doc#: [id number]    CC:

## 2022-08-10 ENCOUNTER — HOSPITAL ENCOUNTER (OUTPATIENT)
Age: 72
Setting detail: SPECIMEN
Discharge: HOME | End: 2022-08-10
Payer: MEDICARE

## 2022-08-10 DIAGNOSIS — R73.03 PREDIABETES: ICD-10-CM

## 2022-08-10 DIAGNOSIS — E03.9 ACQUIRED HYPOTHYROIDISM: ICD-10-CM

## 2022-08-10 LAB
ESTIMATED AVERAGE GLUCOSE: 128 MG/DL
HBA1C MFR BLD: 6.1 % (ref 4–6)
THYROXINE, FREE: 0.85 NG/DL (ref 0.93–1.7)
TSH SERPL DL<=0.05 MIU/L-ACNC: 0.56 UIU/ML (ref 0.3–5)

## 2022-08-10 PROCEDURE — 84439 ASSAY OF FREE THYROXINE: CPT

## 2022-08-10 PROCEDURE — 83036 HEMOGLOBIN GLYCOSYLATED A1C: CPT

## 2022-08-10 PROCEDURE — 36415 COLL VENOUS BLD VENIPUNCTURE: CPT

## 2022-08-10 PROCEDURE — 84443 ASSAY THYROID STIM HORMONE: CPT

## 2022-09-13 ENCOUNTER — TELEPHONE (OUTPATIENT)
Dept: ONCOLOGY | Age: 72
End: 2022-09-13

## 2022-09-21 ENCOUNTER — HOSPITAL ENCOUNTER (INPATIENT)
Age: 72
LOS: 2 days | Discharge: HOME | DRG: 193 | End: 2022-09-23
Attending: EMERGENCY MEDICINE | Admitting: STUDENT IN AN ORGANIZED HEALTH CARE EDUCATION/TRAINING PROGRAM
Payer: MEDICARE

## 2022-09-21 ENCOUNTER — APPOINTMENT (OUTPATIENT)
Dept: GENERAL RADIOLOGY | Age: 72
DRG: 193 | End: 2022-09-21
Payer: MEDICARE

## 2022-09-21 ENCOUNTER — HOSPITAL ENCOUNTER (OUTPATIENT)
Age: 72
Discharge: HOME | End: 2022-09-21
Payer: MEDICARE

## 2022-09-21 ENCOUNTER — HOSPITAL ENCOUNTER (OUTPATIENT)
Dept: CT IMAGING | Age: 72
Discharge: HOME | End: 2022-09-23
Payer: MEDICARE

## 2022-09-21 ENCOUNTER — HOSPITAL ENCOUNTER (OUTPATIENT)
Dept: MAMMOGRAPHY | Age: 72
Discharge: HOME | End: 2022-09-23
Payer: MEDICARE

## 2022-09-21 VITALS — WEIGHT: 275 LBS | HEIGHT: 67 IN | BODY MASS INDEX: 43.16 KG/M2

## 2022-09-21 DIAGNOSIS — J18.9 PNEUMONIA OF LEFT LOWER LOBE DUE TO INFECTIOUS ORGANISM: Primary | ICD-10-CM

## 2022-09-21 DIAGNOSIS — Z12.31 ENCOUNTER FOR SCREENING MAMMOGRAM FOR BREAST CANCER: ICD-10-CM

## 2022-09-21 DIAGNOSIS — R09.02 HYPOXIA: ICD-10-CM

## 2022-09-21 DIAGNOSIS — D47.3 ESSENTIAL (HEMORRHAGIC) THROMBOCYTHEMIA (HCC): ICD-10-CM

## 2022-09-21 DIAGNOSIS — F17.210 NICOTINE DEPENDENCE, CIGARETTES, UNCOMPLICATED: ICD-10-CM

## 2022-09-21 DIAGNOSIS — R73.03 PREDIABETES: ICD-10-CM

## 2022-09-21 LAB
ABSOLUTE EOS #: 0.18 K/UL (ref 0–0.44)
ABSOLUTE EOS #: 0.18 K/UL (ref 0–0.44)
ABSOLUTE IMMATURE GRANULOCYTE: 0.18 K/UL (ref 0–0.3)
ABSOLUTE IMMATURE GRANULOCYTE: 0.18 K/UL (ref 0–0.3)
ABSOLUTE LYMPH #: 2.45 K/UL (ref 1.1–3.7)
ABSOLUTE LYMPH #: 2.45 K/UL (ref 1.1–3.7)
ABSOLUTE MONO #: 1.58 K/UL (ref 0.1–1.2)
ABSOLUTE MONO #: 1.58 K/UL (ref 0.1–1.2)
ALBUMIN SERPL-MCNC: 3.3 G/DL (ref 3.5–5.2)
ALBUMIN SERPL-MCNC: 3.3 G/DL (ref 3.5–5.2)
ALP BLD-CCNC: 105 U/L (ref 35–104)
ALP BLD-CCNC: 105 U/L (ref 35–104)
ALT SERPL-CCNC: 7 U/L (ref 5–33)
ALT SERPL-CCNC: 7 U/L (ref 5–33)
AMYLASE: 35 U/L (ref 28–100)
ANION GAP SERPL CALCULATED.3IONS-SCNC: 10 MMOL/L (ref 9–17)
ANION GAP SERPL CALCULATED.3IONS-SCNC: 10 MMOL/L (ref 9–17)
AST SERPL-CCNC: 12 U/L
AST SERPL-CCNC: 12 U/L
BASOPHILS # BLD: 1 % (ref 0–2)
BASOPHILS # BLD: 1 % (ref 0–2)
BASOPHILS ABSOLUTE: 0.18 K/UL (ref 0–0.2)
BASOPHILS ABSOLUTE: 0.18 K/UL (ref 0–0.2)
BILIRUB SERPL-MCNC: 0.4 MG/DL (ref 0.3–1.2)
BILIRUB SERPL-MCNC: 0.4 MG/DL (ref 0.3–1.2)
BUN BLDV-MCNC: 16 MG/DL (ref 8–23)
BUN BLDV-MCNC: 16 MG/DL (ref 8–23)
BUN/CREAT BLD: 15 (ref 9–20)
BUN/CREAT BLD: 15 (ref 9–20)
CALCIUM SERPL-MCNC: 9.8 MG/DL (ref 8.6–10.4)
CALCIUM SERPL-MCNC: 9.8 MG/DL (ref 8.6–10.4)
CHLORIDE BLD-SCNC: 100 MMOL/L (ref 98–107)
CHLORIDE BLD-SCNC: 100 MMOL/L (ref 98–107)
CO2: 28 MMOL/L (ref 20–31)
CO2: 28 MMOL/L (ref 20–31)
CREAT SERPL-MCNC: 1.1 MG/DL (ref 0.5–0.9)
CREAT SERPL-MCNC: 1.1 MG/DL (ref 0.5–0.9)
EOSINOPHILS RELATIVE PERCENT: 1 % (ref 1–4)
EOSINOPHILS RELATIVE PERCENT: 1 % (ref 1–4)
FOLATE: 5.7 NG/ML
GFR AFRICAN AMERICAN: 59 ML/MIN
GFR AFRICAN AMERICAN: 59 ML/MIN
GFR NON-AFRICAN AMERICAN: 49 ML/MIN
GFR NON-AFRICAN AMERICAN: 49 ML/MIN
GFR SERPL CREATININE-BSD FRML MDRD: ABNORMAL ML/MIN/{1.73_M2}
GFR SERPL CREATININE-BSD FRML MDRD: ABNORMAL ML/MIN/{1.73_M2}
GLUCOSE BLD-MCNC: 106 MG/DL (ref 70–99)
GLUCOSE BLD-MCNC: 106 MG/DL (ref 70–99)
GLUCOSE BLD-MCNC: 124 MG/DL (ref 65–105)
HCT VFR BLD CALC: 43.6 % (ref 36.3–47.1)
HCT VFR BLD CALC: 43.6 % (ref 36.3–47.1)
HEMOGLOBIN: 12.8 G/DL (ref 11.9–15.1)
HEMOGLOBIN: 12.8 G/DL (ref 11.9–15.1)
IMMATURE GRANULOCYTES: 1 %
IMMATURE GRANULOCYTES: 1 %
LACTIC ACID, SEPSIS: 1.9 MMOL/L (ref 0.5–1.9)
LACTIC ACID, SEPSIS: 2.9 MMOL/L (ref 0.5–1.9)
LIPASE: 17 U/L (ref 13–60)
LYMPHOCYTES # BLD: 14 % (ref 24–43)
LYMPHOCYTES # BLD: 14 % (ref 24–43)
MAGNESIUM: 2.1 MG/DL (ref 1.6–2.6)
MCH RBC QN AUTO: 24.7 PG (ref 25.2–33.5)
MCH RBC QN AUTO: 24.7 PG (ref 25.2–33.5)
MCHC RBC AUTO-ENTMCNC: 29.4 G/DL (ref 28.4–34.8)
MCHC RBC AUTO-ENTMCNC: 29.4 G/DL (ref 28.4–34.8)
MCV RBC AUTO: 84.2 FL (ref 82.6–102.9)
MCV RBC AUTO: 84.2 FL (ref 82.6–102.9)
MONOCYTES # BLD: 9 % (ref 3–12)
MONOCYTES # BLD: 9 % (ref 3–12)
NRBC AUTOMATED: 0 PER 100 WBC
NRBC AUTOMATED: 0 PER 100 WBC
PDW BLD-RTO: 15.9 % (ref 11.8–14.4)
PDW BLD-RTO: 15.9 % (ref 11.8–14.4)
PLATELET # BLD: 525 K/UL (ref 138–453)
PLATELET # BLD: 525 K/UL (ref 138–453)
PMV BLD AUTO: 9.3 FL (ref 8.1–13.5)
PMV BLD AUTO: 9.3 FL (ref 8.1–13.5)
POTASSIUM SERPL-SCNC: 3.7 MMOL/L (ref 3.7–5.3)
POTASSIUM SERPL-SCNC: 3.7 MMOL/L (ref 3.7–5.3)
PRO-BNP: 1049 PG/ML
RBC # BLD: 5.18 M/UL (ref 3.95–5.11)
RBC # BLD: 5.18 M/UL (ref 3.95–5.11)
SARS-COV-2, RAPID: NOT DETECTED
SEG NEUTROPHILS: 74 % (ref 36–65)
SEG NEUTROPHILS: 74 % (ref 36–65)
SEGMENTED NEUTROPHILS ABSOLUTE COUNT: 12.93 K/UL (ref 1.5–8.1)
SEGMENTED NEUTROPHILS ABSOLUTE COUNT: 12.93 K/UL (ref 1.5–8.1)
SODIUM BLD-SCNC: 138 MMOL/L (ref 135–144)
SODIUM BLD-SCNC: 138 MMOL/L (ref 135–144)
SPECIMEN DESCRIPTION: NORMAL
TOTAL PROTEIN: 8.8 G/DL (ref 6.4–8.3)
TOTAL PROTEIN: 8.8 G/DL (ref 6.4–8.3)
TROPONIN, HIGH SENSITIVITY: 18 NG/L (ref 0–14)
TROPONIN, HIGH SENSITIVITY: 23 NG/L (ref 0–14)
TSH SERPL DL<=0.05 MIU/L-ACNC: 2.41 UIU/ML (ref 0.3–5)
TSH SERPL DL<=0.05 MIU/L-ACNC: 2.41 UIU/ML (ref 0.3–5)
VITAMIN B-12: >2000 PG/ML (ref 232–1245)
WBC # BLD: 17.5 K/UL (ref 3.5–11.3)
WBC # BLD: 17.5 K/UL (ref 3.5–11.3)

## 2022-09-21 PROCEDURE — 36415 COLL VENOUS BLD VENIPUNCTURE: CPT

## 2022-09-21 PROCEDURE — 82746 ASSAY OF FOLIC ACID SERUM: CPT

## 2022-09-21 PROCEDURE — 2580000003 HC RX 258: Performed by: NURSE PRACTITIONER

## 2022-09-21 PROCEDURE — 71271 CT THORAX LUNG CANCER SCR C-: CPT

## 2022-09-21 PROCEDURE — 1200000000 HC SEMI PRIVATE

## 2022-09-21 PROCEDURE — 6370000000 HC RX 637 (ALT 250 FOR IP): Performed by: NURSE PRACTITIONER

## 2022-09-21 PROCEDURE — 87040 BLOOD CULTURE FOR BACTERIA: CPT

## 2022-09-21 PROCEDURE — 84443 ASSAY THYROID STIM HORMONE: CPT

## 2022-09-21 PROCEDURE — 77067 SCR MAMMO BI INCL CAD: CPT

## 2022-09-21 PROCEDURE — 82947 ASSAY GLUCOSE BLOOD QUANT: CPT

## 2022-09-21 PROCEDURE — 99222 1ST HOSP IP/OBS MODERATE 55: CPT | Performed by: NURSE PRACTITIONER

## 2022-09-21 PROCEDURE — 82150 ASSAY OF AMYLASE: CPT

## 2022-09-21 PROCEDURE — 83690 ASSAY OF LIPASE: CPT

## 2022-09-21 PROCEDURE — 93005 ELECTROCARDIOGRAM TRACING: CPT | Performed by: NURSE PRACTITIONER

## 2022-09-21 PROCEDURE — 6360000002 HC RX W HCPCS: Performed by: NURSE PRACTITIONER

## 2022-09-21 PROCEDURE — 87635 SARS-COV-2 COVID-19 AMP PRB: CPT

## 2022-09-21 PROCEDURE — 83605 ASSAY OF LACTIC ACID: CPT

## 2022-09-21 PROCEDURE — 83735 ASSAY OF MAGNESIUM: CPT

## 2022-09-21 PROCEDURE — 82607 VITAMIN B-12: CPT

## 2022-09-21 PROCEDURE — 85025 COMPLETE CBC W/AUTO DIFF WBC: CPT

## 2022-09-21 PROCEDURE — 94640 AIRWAY INHALATION TREATMENT: CPT

## 2022-09-21 PROCEDURE — 84425 ASSAY OF VITAMIN B-1: CPT

## 2022-09-21 PROCEDURE — 84207 ASSAY OF VITAMIN B-6: CPT

## 2022-09-21 PROCEDURE — 83880 ASSAY OF NATRIURETIC PEPTIDE: CPT

## 2022-09-21 PROCEDURE — 2700000000 HC OXYGEN THERAPY PER DAY

## 2022-09-21 PROCEDURE — 80053 COMPREHEN METABOLIC PANEL: CPT

## 2022-09-21 PROCEDURE — 71045 X-RAY EXAM CHEST 1 VIEW: CPT

## 2022-09-21 PROCEDURE — 99285 EMERGENCY DEPT VISIT HI MDM: CPT

## 2022-09-21 PROCEDURE — 84484 ASSAY OF TROPONIN QUANT: CPT

## 2022-09-21 RX ORDER — ASPIRIN 81 MG/1
81 TABLET ORAL DAILY
Status: DISCONTINUED | OUTPATIENT
Start: 2022-09-22 | End: 2022-09-23 | Stop reason: HOSPADM

## 2022-09-21 RX ORDER — LIOTHYRONINE SODIUM 5 UG/1
10 TABLET ORAL DAILY
Status: DISCONTINUED | OUTPATIENT
Start: 2022-09-22 | End: 2022-09-23 | Stop reason: HOSPADM

## 2022-09-21 RX ORDER — ONDANSETRON 2 MG/ML
4 INJECTION INTRAMUSCULAR; INTRAVENOUS EVERY 6 HOURS PRN
Status: DISCONTINUED | OUTPATIENT
Start: 2022-09-21 | End: 2022-09-23 | Stop reason: HOSPADM

## 2022-09-21 RX ORDER — ALBUTEROL SULFATE 2.5 MG/3ML
2.5 SOLUTION RESPIRATORY (INHALATION)
Status: DISCONTINUED | OUTPATIENT
Start: 2022-09-21 | End: 2022-09-23 | Stop reason: HOSPADM

## 2022-09-21 RX ORDER — CYANOCOBALAMIN (VITAMIN B-12) 5000 MCG
5000 TABLET,DISINTEGRATING ORAL DAILY
COMMUNITY

## 2022-09-21 RX ORDER — NICOTINE 21 MG/24HR
1 PATCH, TRANSDERMAL 24 HOURS TRANSDERMAL DAILY
Status: DISCONTINUED | OUTPATIENT
Start: 2022-09-21 | End: 2022-09-23 | Stop reason: HOSPADM

## 2022-09-21 RX ORDER — BUDESONIDE AND FORMOTEROL FUMARATE DIHYDRATE 160; 4.5 UG/1; UG/1
2 AEROSOL RESPIRATORY (INHALATION) 2 TIMES DAILY
Status: DISCONTINUED | OUTPATIENT
Start: 2022-09-21 | End: 2022-09-23 | Stop reason: HOSPADM

## 2022-09-21 RX ORDER — HYDROCODONE BITARTRATE AND ACETAMINOPHEN 5; 325 MG/1; MG/1
1 TABLET ORAL ONCE
Status: COMPLETED | OUTPATIENT
Start: 2022-09-21 | End: 2022-09-21

## 2022-09-21 RX ORDER — VENLAFAXINE HYDROCHLORIDE 75 MG/1
150 CAPSULE, EXTENDED RELEASE ORAL
Status: DISCONTINUED | OUTPATIENT
Start: 2022-09-22 | End: 2022-09-23 | Stop reason: HOSPADM

## 2022-09-21 RX ORDER — HYDROCHLOROTHIAZIDE 12.5 MG/1
12.5 CAPSULE, GELATIN COATED ORAL DAILY
Status: DISCONTINUED | OUTPATIENT
Start: 2022-09-21 | End: 2022-09-23 | Stop reason: HOSPADM

## 2022-09-21 RX ORDER — AZITHROMYCIN 250 MG/1
500 TABLET, FILM COATED ORAL EVERY 24 HOURS
Status: DISCONTINUED | OUTPATIENT
Start: 2022-09-22 | End: 2022-09-23 | Stop reason: HOSPADM

## 2022-09-21 RX ORDER — ACETAMINOPHEN 325 MG/1
650 TABLET ORAL EVERY 6 HOURS PRN
Status: DISCONTINUED | OUTPATIENT
Start: 2022-09-21 | End: 2022-09-23 | Stop reason: HOSPADM

## 2022-09-21 RX ORDER — ENOXAPARIN SODIUM 100 MG/ML
40 INJECTION SUBCUTANEOUS DAILY
Status: DISCONTINUED | OUTPATIENT
Start: 2022-09-21 | End: 2022-09-21

## 2022-09-21 RX ORDER — SODIUM CHLORIDE 0.9 % (FLUSH) 0.9 %
10 SYRINGE (ML) INJECTION PRN
Status: DISCONTINUED | OUTPATIENT
Start: 2022-09-21 | End: 2022-09-23 | Stop reason: HOSPADM

## 2022-09-21 RX ORDER — DEXTROSE MONOHYDRATE 100 MG/ML
INJECTION, SOLUTION INTRAVENOUS CONTINUOUS PRN
Status: DISCONTINUED | OUTPATIENT
Start: 2022-09-21 | End: 2022-09-23 | Stop reason: HOSPADM

## 2022-09-21 RX ORDER — SODIUM CHLORIDE 9 MG/ML
INJECTION, SOLUTION INTRAVENOUS CONTINUOUS
Status: DISCONTINUED | OUTPATIENT
Start: 2022-09-21 | End: 2022-09-22

## 2022-09-21 RX ORDER — TIMOLOL MALEATE 5 MG/ML
1 SOLUTION/ DROPS OPHTHALMIC 2 TIMES DAILY
Status: DISCONTINUED | OUTPATIENT
Start: 2022-09-21 | End: 2022-09-23 | Stop reason: HOSPADM

## 2022-09-21 RX ORDER — IPRATROPIUM BROMIDE AND ALBUTEROL SULFATE 2.5; .5 MG/3ML; MG/3ML
1 SOLUTION RESPIRATORY (INHALATION)
Status: DISCONTINUED | OUTPATIENT
Start: 2022-09-21 | End: 2022-09-21

## 2022-09-21 RX ORDER — DORZOLAMIDE HYDROCHLORIDE AND TIMOLOL MALEATE 20; 5 MG/ML; MG/ML
1 SOLUTION/ DROPS OPHTHALMIC EVERY 12 HOURS SCHEDULED
Status: DISCONTINUED | OUTPATIENT
Start: 2022-09-21 | End: 2022-09-21

## 2022-09-21 RX ORDER — ENOXAPARIN SODIUM 100 MG/ML
30 INJECTION SUBCUTANEOUS 2 TIMES DAILY
Status: DISCONTINUED | OUTPATIENT
Start: 2022-09-22 | End: 2022-09-23 | Stop reason: HOSPADM

## 2022-09-21 RX ORDER — INSULIN LISPRO 100 [IU]/ML
0-4 INJECTION, SOLUTION INTRAVENOUS; SUBCUTANEOUS
Status: DISCONTINUED | OUTPATIENT
Start: 2022-09-21 | End: 2022-09-23 | Stop reason: HOSPADM

## 2022-09-21 RX ORDER — LANOLIN ALCOHOL/MO/W.PET/CERES
5000 CREAM (GRAM) TOPICAL DAILY
Status: DISCONTINUED | OUTPATIENT
Start: 2022-09-22 | End: 2022-09-23 | Stop reason: HOSPADM

## 2022-09-21 RX ORDER — DORZOLAMIDE HCL 20 MG/ML
1 SOLUTION/ DROPS OPHTHALMIC 2 TIMES DAILY
Status: DISCONTINUED | OUTPATIENT
Start: 2022-09-21 | End: 2022-09-23 | Stop reason: HOSPADM

## 2022-09-21 RX ORDER — LOSARTAN POTASSIUM 50 MG/1
50 TABLET ORAL DAILY
Status: DISCONTINUED | OUTPATIENT
Start: 2022-09-21 | End: 2022-09-23 | Stop reason: HOSPADM

## 2022-09-21 RX ORDER — SODIUM CHLORIDE 9 MG/ML
INJECTION, SOLUTION INTRAVENOUS PRN
Status: DISCONTINUED | OUTPATIENT
Start: 2022-09-21 | End: 2022-09-23 | Stop reason: HOSPADM

## 2022-09-21 RX ORDER — SODIUM CHLORIDE 9 MG/ML
INJECTION, SOLUTION INTRAVENOUS CONTINUOUS
Status: DISCONTINUED | OUTPATIENT
Start: 2022-09-21 | End: 2022-09-21

## 2022-09-21 RX ORDER — LEVOTHYROXINE SODIUM 0.07 MG/1
75 TABLET ORAL DAILY
Status: DISCONTINUED | OUTPATIENT
Start: 2022-09-22 | End: 2022-09-23 | Stop reason: HOSPADM

## 2022-09-21 RX ORDER — INSULIN LISPRO 100 [IU]/ML
0-4 INJECTION, SOLUTION INTRAVENOUS; SUBCUTANEOUS NIGHTLY
Status: DISCONTINUED | OUTPATIENT
Start: 2022-09-21 | End: 2022-09-23 | Stop reason: HOSPADM

## 2022-09-21 RX ORDER — ONDANSETRON 4 MG/1
4 TABLET, ORALLY DISINTEGRATING ORAL EVERY 8 HOURS PRN
Status: DISCONTINUED | OUTPATIENT
Start: 2022-09-21 | End: 2022-09-23 | Stop reason: HOSPADM

## 2022-09-21 RX ORDER — LOSARTAN POTASSIUM AND HYDROCHLOROTHIAZIDE 12.5; 5 MG/1; MG/1
1 TABLET ORAL DAILY
Status: DISCONTINUED | OUTPATIENT
Start: 2022-09-22 | End: 2022-09-21

## 2022-09-21 RX ORDER — SODIUM CHLORIDE 0.9 % (FLUSH) 0.9 %
5-40 SYRINGE (ML) INJECTION EVERY 12 HOURS SCHEDULED
Status: DISCONTINUED | OUTPATIENT
Start: 2022-09-21 | End: 2022-09-23 | Stop reason: HOSPADM

## 2022-09-21 RX ORDER — ACETAMINOPHEN 650 MG/1
650 SUPPOSITORY RECTAL EVERY 6 HOURS PRN
Status: DISCONTINUED | OUTPATIENT
Start: 2022-09-21 | End: 2022-09-23 | Stop reason: HOSPADM

## 2022-09-21 RX ADMIN — LOSARTAN POTASSIUM 50 MG: 50 TABLET, FILM COATED ORAL at 22:19

## 2022-09-21 RX ADMIN — BUDESONIDE AND FORMOTEROL FUMARATE DIHYDRATE 2 PUFF: 160; 4.5 AEROSOL RESPIRATORY (INHALATION) at 20:55

## 2022-09-21 RX ADMIN — CEFTRIAXONE SODIUM 1000 MG: 1 INJECTION, POWDER, FOR SOLUTION INTRAMUSCULAR; INTRAVENOUS at 17:11

## 2022-09-21 RX ADMIN — HYDROCHLOROTHIAZIDE 12.5 MG: 12.5 CAPSULE ORAL at 22:19

## 2022-09-21 RX ADMIN — SODIUM CHLORIDE: 9 INJECTION, SOLUTION INTRAVENOUS at 18:52

## 2022-09-21 RX ADMIN — DORZOLAMIDE HYDROCHLORIDE 1 DROP: 20 SOLUTION/ DROPS OPHTHALMIC at 22:25

## 2022-09-21 RX ADMIN — TIMOLOL MALEATE 1 DROP: 5 SOLUTION/ DROPS OPHTHALMIC at 22:24

## 2022-09-21 RX ADMIN — SODIUM CHLORIDE: 9 INJECTION, SOLUTION INTRAVENOUS at 22:28

## 2022-09-21 RX ADMIN — HYDROCODONE BITARTRATE AND ACETAMINOPHEN 1 TABLET: 5; 325 TABLET ORAL at 15:04

## 2022-09-21 RX ADMIN — METFORMIN HYDROCHLORIDE 500 MG: 500 TABLET ORAL at 22:19

## 2022-09-21 RX ADMIN — AZITHROMYCIN MONOHYDRATE 500 MG: 500 INJECTION, POWDER, LYOPHILIZED, FOR SOLUTION INTRAVENOUS at 18:50

## 2022-09-21 ASSESSMENT — PAIN SCALES - GENERAL
PAINLEVEL_OUTOF10: 5
PAINLEVEL_OUTOF10: 10
PAINLEVEL_OUTOF10: 10

## 2022-09-21 ASSESSMENT — LIFESTYLE VARIABLES
HOW OFTEN DO YOU HAVE A DRINK CONTAINING ALCOHOL: NEVER
HOW MANY STANDARD DRINKS CONTAINING ALCOHOL DO YOU HAVE ON A TYPICAL DAY: PATIENT DOES NOT DRINK

## 2022-09-21 ASSESSMENT — ENCOUNTER SYMPTOMS
COUGH: 1
WHEEZING: 0
CONSTIPATION: 0
VOMITING: 0
COUGH: 0
SHORTNESS OF BREATH: 1
ABDOMINAL PAIN: 0
ABDOMINAL PAIN: 1
CHEST TIGHTNESS: 0
DIARRHEA: 0
NAUSEA: 0

## 2022-09-21 ASSESSMENT — PAIN DESCRIPTION - DESCRIPTORS: DESCRIPTORS: SHARP

## 2022-09-21 ASSESSMENT — PAIN DESCRIPTION - FREQUENCY: FREQUENCY: CONTINUOUS

## 2022-09-21 ASSESSMENT — PAIN DESCRIPTION - ORIENTATION: ORIENTATION: RIGHT

## 2022-09-21 ASSESSMENT — PAIN DESCRIPTION - LOCATION: LOCATION: CHEST

## 2022-09-21 ASSESSMENT — PAIN - FUNCTIONAL ASSESSMENT: PAIN_FUNCTIONAL_ASSESSMENT: 0-10

## 2022-09-21 NOTE — ED NOTES
Pt assisted up to [facility] without difficulty. Standby assist needed. Call light within reach.      Ara Arboleda RN  09/21/22 [id number]

## 2022-09-21 NOTE — ED NOTES
Pt resting quietly on stretcher. Pt aware of wait for bed placement. Denies further needs at this time.       Jesús Hardwick RN  09/21/22 [id number]

## 2022-09-21 NOTE — LETTER
[facility] Emergency Department      [address] [place] [address], [address] [place] [address] [phone]            PROOF OF PRESENCE      To Whom It May Concern:    Kelsey Suarez was present in the Emergency Department at [facility] on 09/21/2022.                                      Sincerely,        Kort[facility] Ramirez Emergency Department

## 2022-09-21 NOTE — ED PROVIDER NOTES
[address] [facility] ED  EMERGENCY DEPARTMENT ENCOUNTER   ATTENDING ATTESTATION     Pt Name: Michael Leon  MRN: [id number]  Uday 1950  Date of evaluation: 22       Michael Leon is a 67 y.o. female who presents with Chest Pain (Onset last Fri)      MDM:     Patient's EKG shows sinus rhythm with a rate of 82 [place] QRS QTC normal is unremarkable patient has left axis deviation no ST elevations or depressions, no significant T wave changes. Nonspecific EKG. Vitals:   Vitals:    22 1417 22 1422 22 1500 22 1545   BP:  (!) 150/73     Pulse: 89 87 86 82   Resp: 20  30 19   Temp:  98.1 °F (36.7 °C)     TempSrc:  Oral     SpO2:  (!) 85% 96% 96%         This visit was performed by both a physician and an APC. I personally evaluated and examined the patient.  I performed all aspects of the MDM as documented     Harleen Erickson MD  Attending Emergency  Physician                 Kimberly Callejas MD  [phone]

## 2022-09-21 NOTE — ED PROVIDER NOTES
[address] [place] [address] ED  EMERGENCY DEPARTMENT ENCOUNTER      Pt Name: Gilbert Castillo  MRN: [id number]  Uday 1950  Date of evaluation: 9/21/2022  Provider: FABI Monroy CNP    CHIEF COMPLAINT       Chief Complaint   Patient presents with    Chest Pain     Onset last Fri         HISTORY OFPRESENT ILLNESS  (Location/Symptom, Timing/Onset, Context/Setting, Quality, Duration, Modifying Factors, Severity.)   Gilbert Castillo is a 67 y.o. female who presents to the emergency department by private auto for evaluation of midsternal chest pain upper abdominal pain has been going on for the past 6 days. Pain is a 10. Pain described as a intermittent sharp sensation. Denies cough but she is tachypneic upon arrival.  No nausea or vomiting. States she does have a history of hiatal hernia. History of tobacco use. Patient states she does not have a history of COPD or asthma but her pulmonologist placed her on oxygen at home 7 months ago. States she wears oxygen at home as needed. Also has a history of stroke, thyroid disease, hypertension, obesity, and brain aneurysm. SPO2 on room air upon arrival 85%. She was placed on 2 L nasal cannula and SPO2 is 96%. She is afebrile. Patient states he had a cardiac cath many years ago but does not have stents. Patient had outpatient labs, CT lung screening, and mammogram done as outpatient. [facility] prior to arriving to the ER today. Nursing Notes were reviewed.     PASTMEDICAL HISTORY     Past Medical History:   Diagnosis Date    Alkaline phosphatase elevation 06/09/2016    stable last 2 check    Arthritis     Brain aneurysm     Bronchitis 10/18/2016    Dyspepsia     Exercise counseling 03/10/2016    Frequent urination 03/07/2019    Headache(784.0)     Hypertension     Leukocytosis 04/11/2016    Major depressive disorder     Obesity     Stroke [facility]) 2002    Thyroid disease     Viral upper respiratory tract infection 06/09/2016         SURGICAL HISTORY 50.00     Pack years: 25.00     Types: Cigarettes    Smokeless tobacco: Never   Vaping Use    Vaping Use: Never used   Substance and Sexual Activity    Alcohol use: No    Drug use: No     Social Determinants of Health     Physical Activity: Unknown    Days of Exercise per Week: 0 days         REVIEW OF SYSTEMS    (2-9 systems for level 4, 10 or more for level 5)     Review of Systems   Constitutional:  Positive for activity change. Negative for fever. Respiratory:  Positive for shortness of breath. Negative for cough and wheezing. Cardiovascular:  Positive for chest pain. Gastrointestinal:  Positive for abdominal pain. Negative for nausea and vomiting. All other systems reviewed and are negative. Except as noted above the remainder of the review of systems was reviewed and negative. PHYSICAL EXAM    (up to 7 for level 4, 8 or more for level 5)     ED Triage Vitals   BP Temp Temp Source Heart Rate Resp SpO2 Height Weight   09/21/22 1422 09/21/22 1422 09/21/22 1422 09/21/22 1417 09/21/22 1417 09/21/22 1422 -- --   (!) 150/73 98.1 °F (36.7 °C) Oral 89 20 (!) 85 %         Physical Exam  Constitutional:       Appearance: Normal appearance. She is well-developed, well-groomed and overweight. HENT:      Head: Normocephalic. Right Ear: External ear normal.      Left Ear: External ear normal.      Nose: Nose normal.   Eyes:      Conjunctiva/sclera: Conjunctivae normal.   Cardiovascular:      Rate and Rhythm: Normal rate and regular rhythm. Heart sounds: Normal heart sounds. Pulmonary:      Effort: Tachypnea present. Breath sounds: Examination of the left-lower field reveals rhonchi. Rhonchi present. Abdominal:      General: Bowel sounds are normal.      Palpations: Abdomen is soft. Tenderness: abdominal tenderness in the epigastric area There is no guarding or rebound. Comments: Abdomen is soft to palpation. She has epigastric tenderness. No lower abdominal tenderness. Musculoskeletal:         General: Normal range of motion. Cervical back: Normal range of motion. Right lower leg: No edema. Left lower leg: No edema. Skin:     General: Skin is warm and dry. Neurological:      Mental Status: She is alert and oriented to person, place, and time. DIAGNOSTIC RESULTS     EKG:All EKG's are interpreted by the Emergency Department Physician who either signs or Co-signs this chart in the absence of a cardiologist.    EKG interpreted by ER physician    RADIOLOGY:   Non-plain film images such as CT, Ultrasound and MRI are read by theradiologist. [facility] radiographic images are visualized and preliminarily interpreted by the emergency physician with the below findings:    [facility] HERSON DIGITAL SCREEN BILATERAL    Result Date: 9/21/2022  EXAMINATION: SCREENING DIGITAL BILATERAL MAMMOGRAM WITH TOMOSYNTHESIS, 9/21/2022 TECHNIQUE: Screening mammography was performed with tomosynthesis including MLO and CC views of the bilateral breasts. Computer aided detection was used for the interpretation of this exam. COMPARISON: Mammogram dated 12/10/2020, 12/05/2018, 11/26/2016, 10/31/2014 HISTORY: Screening. FINDINGS: The breasts are almost entirely fatty. There is no suspicious mass, architectural distortion, or calcification. No mammographic findings of malignancy. BI-RADS 1 BIRADS - CATEGORY 1 Negative, no evidence of malignancy. Normal interval follow-up is recommended in 12 months. OVERALL ASSESSMENT - NEGATIVE A letter of notification will be sent to the patient regarding the results. The [employer] of [employer] recommends annual mammograms for women 40 years and older.      CT Lung Screening    Result Date: 9/21/2022  EXAMINATION: LOW DOSE SCREENING CT OF THE CHEST WITHOUT CONTRAST 9/21/2022 1:31 pm TECHNIQUE: Low dose lung cancer screening CT of the chest was performed without the administration of intravenous contrast.  Multiplanar reformatted images are provided for review. Automated exposure control, iterative reconstruction, and/or weight based adjustment of the mA/kV was utilized to reduce the radiation dose to as low as reasonably achievable. COMPARISON: CT lung screening examination from 2019 HISTORY: Screening. Patient Age: 66 y/o Number of pack years of smokin If no longer smoking, number of years since cessation:  NA Other symptoms:  None. Additional history: ORDERING SYSTEM PROVIDED HISTORY: Nicotine dependence, cigarettes, uncomplicated TECHNOLOGIST PROVIDED HISTORY: Patient age is 50-69 years? ->Yes Current smoker? ->Yes Pack years?->25 Last Chest CT >= 11 months ago? ->Yes Offered tobacco cessation counseling? ->Yes Signs/symptoms of lung cancer? ->No Engaged in shared decision making? ->Yes Life expectancy > 5 years? ->No Is there documentation of shared decision making? ->Yes Does the patient show any signs or symptoms of lung cancer? ->No Is this the first (baseline) CT or an annual exam?->Baseline Is this a low dose CT or a routine CT?->Low Dose CT Smoking Status? ->Every Day Smoking packs per day?->.5 Years smoking?->50 Screening Reason for Exam: Current smoker, 1 pk a day for 50 years FINDINGS: Mediastinum: Stable enlarged cardiac chambers, more ventricular; unchanged mild dilatation main PA (34 mm). New small pericardial effusion. Unchanged right coronary artery calcification. No acute abnormality thoracic aorta; scattered plaque disease again noted. No bulky lymphadenopathy. No acute thyroid or esophageal abnormality. Lungs/Pleura:  Interval development LLL consolidation/volume loss with air bronchograms/adjacent moderate sized loculated appearing pleural effusion extending to the apex posteriorly, also with extension into the major fissure (with a probable pseudo mass). Underlying pathology LLL not excluded on this unenhanced scan. Right lung clear; no right pleural effusion. No suspicious nodule tracheobronchial tree patent.  Upper Abdomen: No acute abnormality visualized structures upper abdomen. Soft Tissues/Bones: Similar mild convex-right curvature and scoliosis thoracic spine with multilevel mild degenerative changes and DDD, similar by comparison. Interval development moderate-sized loculated left pleural effusion with LLL consolidation, most likely on the basis and infectious or inflammatory process. Underlying LLL pathology, however, not excluded. No suspicious nodule seen aerated lung. Following thoracentesis, depending upon results, follow-up CT chest in 3-6 months recommended (see below). LUNG RADS: Per ACR Lung-RADS Version 1.1 Category 3, Probably benign (Probably benign finding(s) - short term follow up suggested; includes nodules with a low likelihood of becoming a clinically active cancer). Management: 6 Month LDCT. RECOMMENDATIONS: If you would like to register your patient with the [facility] Nodule/Lung Cancer Screening Program, please contact the Nurse Navigator at [phone]).        Interpretation per the Radiologist below, if available at the time of this note:    No orders to display         EDBEDSIDE ULTRASOUND:   Performed by Elsa grewal    LABS:  Labs Reviewed   CBC WITH AUTO DIFFERENTIAL - Abnormal; Notable for the following components:       Result Value    WBC 17.5 (*)     RBC 5.18 (*)     MCH 24.7 (*)     RDW 15.9 (*)     Platelets 946 (*)     Seg Neutrophils 74 (*)     Lymphocytes 14 (*)     Immature Granulocytes 1 (*)     Segs Absolute 12.93 (*)     Absolute Mono # 1.58 (*)     All other components within normal limits   COMPREHENSIVE METABOLIC PANEL W/ REFLEX TO MG FOR LOW K - Abnormal; Notable for the following components:    Glucose 106 (*)     Creatinine 1.10 (*)     Alkaline Phosphatase 105 (*)     Total Protein 8.8 (*)     Albumin 3.3 (*)     GFR Non- 49 (*)     GFR  59 (*)     All other components within normal limits   TROPONIN - Abnormal; Notable for the following components:    Troponin, High Sensitivity 23 (*)     All other components within normal limits   BRAIN NATRIURETIC PEPTIDE - Abnormal; Notable for the following components:    Pro-BNP 1,049 (*)     All other components within normal limits   LACTATE, SEPSIS - Abnormal; Notable for the following components:    Lactic Acid, Sepsis 2.9 (*)     All other components within normal limits   TROPONIN - Abnormal; Notable for the following components:    Troponin, High Sensitivity 18 (*)     All other components within normal limits   COVID-19, RAPID   CULTURE, BLOOD 1   CULTURE, BLOOD 2   MAGNESIUM   TSH   LIPASE   AMYLASE   LACTATE, SEPSIS       All other labs were within normal range or not returned as of this dictation. EMERGENCY DEPARTMENT COURSE andDIFFERENTIAL DIAGNOSIS/MDM:   Patient evaluated in conjunction with ER physician. SPO2 85% on room air upon arrival.  Respirations 20. She is afebrile. She was placed on 2 L nasal cannula SPO2 96%. She initially refused IV. Was given Norco for pain upon arrival.  Patient was informed she needs to be admitted for further evaluation she then agreed to IV access. CT lung scan shows Interval development moderate-sized loculated left pleural effusion with LLL consolidation, most likely on the basis and infectious or inflammatory process. Underlying LLL pathology, however, not excluded. No suspicious nodule seen aerated lung. BC 17.5. TSH 2.41. Initial lactic acid 2.9. 2 hour repeat lactic acid ordered. Blood cultures obtained. Patient is not hypotensive. She is afebrile. Troponin 23->18.  proBNP 1049. Patient started on Alena@[employer] and given Rocephin and azithromycin in the ED. Test results and  admission to hospital discussed with patient. Internal medicine consult-I spoke with Tg Gomez from [facility] who accepts admission.       Vitals:    Vitals:    09/21/22 1500 09/21/22 1545 09/21/22 1558 09/21/22 1630   BP:   (!) 134/90    Pulse: 86 82 87 79   Resp: 30 19 18 23   Temp:       TempSrc:       SpO2: 96% 96% 96% 97%         CONSULTS:  IP CONSULT TO INTERNAL MEDICINE    RES:  Procedures    FINAL IMPRESSION      1. Pneumonia of left lower lobe due to infectious organism    2. Hypoxia          DISPOSITION/PLAN   DISPOSITION Admitted 09/21/2022 04:43:40 PM      PATIENT REFERRED TO:   No follow-up provider specified.     DISCHARGE MEDICATIONS:     New Prescriptions    No medications on file     Electronically signed by FABI Campbell 9/21/2022 at 5:40 PM            FABI Campbell CNP  09/21/22 1742

## 2022-09-21 NOTE — H&P
[facility]  Office: [address], DO, Raj Ards, DO, Emilysandeep Imperial, DO, [place] Will Blood, DO, [place] Saxena MD, Ursula Mckenzie MD, Jojo Schilling MD, Martina Garza MD,  Marilu Pineda MD, Swetha Pacheco MD, Maria E Jacobsen, DO, Kamila Melton MD,  Flower Nagel MD, Denisha Yeboah MD, Tori Moritz, DO, Moraima Vieira MD, Melania Baca MD, Dariel Ponce MD, Teresa Brito MD, John Shane MD, Emily Coppola MD, Facundo Mccarthy, DO, Delvin Matos MD, Kathleen Lam MD, Rumalda Snellen, CNP,  Kia Painter, CNP, Henok Knox, CNP, Arley Cash, CNP,  Macarena Roach, DNP, Mahsa Mcpherson, CNP, Aleah Landa, CNP, Debbie Ornelas, CNP, Marisol Summers, CNP, Fernando Lisa, CNP, Aric Monsivais PA-C, Garo Baird, CNS, Fidel Hendrickson, DNP, Odilia Chahal, CNP, Odessa Love, CNP, Cecelia Swartz, CNP         [facility]    HISTORY AND PHYSICAL EXAMINATION            Date:   9/21/2022  Patient name:  Beba Byrd  Date of admission:  9/21/2022  2:24 PM  MRN:   [id number]  Account:  [de-identified]  YOB: 1950  PCP:    Devyn Joiner MD  Room:   [place] [id number]  Code Status:    Full    Chief Complaint:     Chief Complaint   Patient presents with    Chest Pain     Onset last Fri     History Obtained From:     patient, electronic medical record    History of Present Illness:     Patient presents to the emergency room today with complaints of shortness of breath and chest pain. Patient states that her chest pain started last Friday and has persisted. Patient's shortness of breath started when she arrived to the emergency room. Patient's chest pain is midsternal/upper abdomen and is described as intermittently sharp. Patient states that she has also been experiencing a dry, non-productive cough.  Patient states that she was placed on oxygen by her pulmonologist. She states that she uses about 2L and states that the last time she has used it has [place] a while\". Patient also states that she has not been eating and drinking well over the past few days due to the tests that she had scheduled. She states that some of them required her to be NPO. Of note, patient was scheduled for a CT chest and mamogram, which she had completed prior to her ED arrival. Patient has a significant past medical history of stroke, brain aneurysm, hypertension, and depression. Patient continues to smoke cigarettes but does not drink alcohol. Throughout the emergency room evaluation, it was noted that the patient's pulse oximetry level was 85% on RA. It improved to 96% with 2L/ NC. Her creatinine 1.10. GFR 59. Lactic acid 2.9.  proBNP 1049. Troponin 23 and 18. WBC 17.5. Platelet count 436. CT lung screen shows:   Interval development moderate-sized loculated left pleural effusion with LLL   consolidation, most likely on the basis and infectious or inflammatory   process. Underlying LLL pathology, however, not excluded. No suspicious nodule seen aerated lung. Following thoracentesis, depending   upon results, follow-up CT chest in 3-6 months recommended (see below).        Past Medical History:     Past Medical History:   Diagnosis Date    Alkaline phosphatase elevation 06/09/2016    stable last 2 check    Arthritis     Brain aneurysm     Bronchitis 10/18/2016    Dyspepsia     Exercise counseling 03/10/2016    Frequent urination 03/07/2019    Headache(784.0)     Hypertension     Leukocytosis 04/11/2016    Major depressive disorder     Obesity     Stroke [facility]) 2002    Thyroid disease     Viral upper respiratory tract infection 06/09/2016        Past Surgical History:     Past Surgical History:   Procedure Laterality Date    APPENDECTOMY      BRAIN SURGERY      for Cerebral aneurysm    CARDIAC SURGERY      cardiac catheterization no stents    COLONOSCOPY      CYST REMOVAL      hand and eye    HYSTERECTOMY (CERVIX STATUS UNKNOWN) OVARIAN CYST REMOVAL      TUBAL LIGATION          Medications Prior to Admission:     Prior to Admission medications    Medication Sig Start Date End Date Taking? Authorizing Provider   losartan-hydroCHLOROthiazide [facility]) 50-12.5 MG per tablet Take 1 tablet by mouth daily 9/12/22   Umer Sabillon MD   levothyroxine (SYNTHROID) 75 MCG tablet Take 1 tablet by mouth Daily 9/12/22   Umer Sabillon MD   venlafaxine (EFFEXOR XR) 150 MG extended release capsule TAKE ONE CAPSULE BY MOUTH DAILY 9/12/22   Umer Sabillon MD   liothyronine (CYTOMEL) 5 MCG tablet Take 2 tablets by mouth daily 9/12/22 12/11/22  Umer Sabillon MD   BREO ELLIPTA 200-25 MCG/INH AEPB inhaler INHALE 1 PUFF BY MOUTH DAILY 7/25/22   Historical Provider, MD   metFORMIN (GLUCOPHAGE) 500 MG tablet Take 1 tablet by mouth 2 times daily (with meals) 8/23/22 11/21/22  Umer Sabillon MD   ibuprofen (ADVIL;MOTRIN) 200 MG tablet Take 800 mg by mouth as needed for Pain    Historical Provider, MD   Blood Pressure KIT Check B P BID  Patient not taking: Reported on 5/6/2022 6/24/21   Umer Sabillon MD   dorzolamide-timolol (COSOPT) 22.3-6.8 MG/ML ophthalmic solution INSTILL 1 DROP IN RIGHT EYE TWICE DAILY 2/27/21   Historical Provider, MD   Cholecalciferol (VITAMIN D) 2000 UNITS CAPS capsule Take by mouth daily    Historical Provider, MD   aspirin 81 MG tablet Take 81 mg by mouth daily. Historical Provider, MD        Allergies:     Aspirin, Baclofen, and Wellbutrin [bupropion]    Social History:     Tobacco:    reports that she has been smoking cigarettes. She has a 25.00 pack-year smoking history. She has never used smokeless tobacco.  Alcohol:      reports no history of alcohol use. Drug Use:  reports no history of drug use.     Family History:     Family History   Problem Relation Age of Onset    High Blood Pressure Mother     Diabetes Mother     Alcohol Abuse Father     High Blood Pressure Sister     Diabetes Sister     Cancer Sister leukemia    Alcohol Abuse Sister     Thyroid Disease Other     Breast Cancer Sister         aunt     Cancer Maternal Aunt         breast       Review of Systems:     Positive and Negative as described in HPI. Review of Systems   Constitutional:  Positive for diaphoresis. Negative for activity change, chills, fatigue and fever. HENT:  Negative for congestion. Respiratory:  Positive for cough and shortness of breath. Negative for chest tightness. Cardiovascular:  Positive for chest pain. Negative for palpitations and leg swelling. Gastrointestinal:  Negative for abdominal pain, constipation, diarrhea, nausea and vomiting. Endocrine: Negative for cold intolerance and polyuria. Genitourinary:  Negative for difficulty urinating. Musculoskeletal:  Negative for arthralgias and myalgias. Skin:  Negative for wound. Neurological:  Negative for dizziness, weakness and numbness. Psychiatric/Behavioral:  Negative for confusion. Physical Exam:   BP (!) 150/73   Pulse 82   Temp 98.1 °F (36.7 °C) (Oral)   Resp 19   SpO2 96%   Temp (24hrs), Av.1 °F (36.7 °C), Min:98.1 °F (36.7 °C), Max:98.1 °F (36.7 °C)    No results for input(s): POCGLU in the last 72 hours. No intake or output data in the 24 hours ending 22 1647    Physical Exam  Vitals and nursing note reviewed. Constitutional:       General: She is not in acute distress. Appearance: Normal appearance. Interventions: Nasal cannula in place. HENT:      Head: Normocephalic. Mouth/Throat:      Mouth: Mucous membranes are moist.   Eyes:      Pupils: Pupils are equal, round, and reactive to light. Cardiovascular:      Rate and Rhythm: Normal rate and regular rhythm. Pulses: Normal pulses. Heart sounds: Normal heart sounds. No murmur heard. No friction rub. No gallop. Pulmonary:      Effort: Pulmonary effort is normal. No respiratory distress.       Breath sounds: Examination of the left-middle field reveals rales. Examination of the left-lower field reveals rales. Rales present. No wheezing. Abdominal:      General: Bowel sounds are normal. There is no distension. Palpations: Abdomen is soft. Tenderness: There is no abdominal tenderness. Musculoskeletal:         General: No swelling. Normal range of motion. Cervical back: Normal range of motion. Skin:     General: Skin is warm and dry. Capillary Refill: Capillary refill takes less than 2 seconds. Coloration: Skin is not pale. Neurological:      General: No focal deficit present. Mental Status: She is alert and oriented to person, place, and time. Motor: No weakness. Psychiatric:         Mood and Affect: Mood normal.         Behavior: Behavior normal.         Thought Content:  Thought content normal.         Judgment: Judgment normal.       Investigations:      Laboratory Testing:  Recent Results (from the past 24 hour(s))   CBC with Auto Differential    Collection Time: 09/21/22  1:40 PM   Result Value Ref Range    WBC 17.5 (H) 3.5 - 11.3 k/uL    RBC 5.18 (H) 3.95 - 5.11 m/uL    Hemoglobin 12.8 11.9 - 15.1 g/dL    Hematocrit 43.6 36.3 - 47.1 %    MCV 84.2 82.6 - 102.9 fL    MCH 24.7 (L) 25.2 - 33.5 pg    MCHC 29.4 28.4 - 34.8 g/dL    RDW 15.9 (H) 11.8 - 14.4 %    Platelets 328 (H) 895 - 453 k/uL    MPV 9.3 8.1 - 13.5 fL    NRBC Automated 0.0 0.0 per 100 WBC    Seg Neutrophils 74 (H) 36 - 65 %    Lymphocytes 14 (L) 24 - 43 %    Monocytes 9 3 - 12 %    Eosinophils % 1 1 - 4 %    Basophils 1 0 - 2 %    Immature Granulocytes 1 (H) 0 %    Segs Absolute 12.93 (H) 1.50 - 8.10 k/uL    Absolute Lymph # 2.45 1.10 - 3.70 k/uL    Absolute Mono # 1.58 (H) 0.10 - 1.20 k/uL    Absolute Eos # 0.18 0.00 - 0.44 k/uL    Basophils Absolute 0.18 0.00 - 0.20 k/uL    Absolute Immature Granulocyte 0.18 0.00 - 0.30 k/uL   Comprehensive Metabolic Panel w/ Reflex to MG    Collection Time: 09/21/22  1:40 PM   Result Value Ref Range    Glucose 106 (H) 70 - 99 mg/dL    BUN 16 8 - 23 mg/dL    Creatinine 1.10 (H) 0.50 - 0.90 mg/dL    Bun/Cre Ratio 15 9 - 20    Calcium 9.8 8.6 - 10.4 mg/dL    Sodium 138 135 - 144 mmol/L    Potassium 3.7 3.7 - 5.3 mmol/L    Chloride 100 98 - 107 mmol/L    CO2 28 20 - 31 mmol/L    Anion Gap 10 9 - 17 mmol/L    Alkaline Phosphatase 105 (H) 35 - 104 U/L    ALT 7 5 - 33 U/L    AST 12 <32 U/L    Total Bilirubin 0.4 0.3 - 1.2 mg/dL    Total Protein 8.8 (H) 6.4 - 8.3 g/dL    Albumin 3.3 (L) 3.5 - 5.2 g/dL    GFR Non- 49 (L) >60 mL/min    GFR  59 (L) >60 mL/min    GFR Comment         Troponin    Collection Time: 09/21/22  1:40 PM   Result Value Ref Range    Troponin, High Sensitivity 23 (H) 0 - 14 ng/L   Brain Natriuretic Peptide    Collection Time: 09/21/22  1:40 PM   Result Value Ref Range    Pro-BNP 1,049 (H) <300 pg/mL   Magnesium    Collection Time: 09/21/22  1:40 PM   Result Value Ref Range    Magnesium 2.1 1.6 - 2.6 mg/dL   TSH    Collection Time: 09/21/22  1:40 PM   Result Value Ref Range    TSH 2.41 0.30 - 5.00 uIU/mL   Lipase    Collection Time: 09/21/22  1:40 PM   Result Value Ref Range    Lipase 17 13 - 60 U/L   Amylase    Collection Time: 09/21/22  1:40 PM   Result Value Ref Range    Amylase 35 28 - 100 U/L   CBC with Auto Differential    Collection Time: 09/21/22  1:42 PM   Result Value Ref Range    WBC 17.5 (H) 3.5 - 11.3 k/uL    RBC 5.18 (H) 3.95 - 5.11 m/uL    Hemoglobin 12.8 11.9 - 15.1 g/dL    Hematocrit 43.6 36.3 - 47.1 %    MCV 84.2 82.6 - 102.9 fL    MCH 24.7 (L) 25.2 - 33.5 pg    MCHC 29.4 28.4 - 34.8 g/dL    RDW 15.9 (H) 11.8 - 14.4 %    Platelets 622 (H) 985 - 453 k/uL    MPV 9.3 8.1 - 13.5 fL    NRBC Automated 0.0 0.0 per 100 WBC    Seg Neutrophils 74 (H) 36 - 65 %    Lymphocytes 14 (L) 24 - 43 %    Monocytes 9 3 - 12 %    Eosinophils % 1 1 - 4 %    Basophils 1 0 - 2 %    Immature Granulocytes 1 (H) 0 %    Segs Absolute 12.93 (H) 1.50 - 8.10 k/uL    Absolute Lymph # 2.45 1.10 - 3.70 k/uL    Absolute Mono # 1.58 (H) 0.10 - 1.20 k/uL    Absolute Eos # 0.18 0.00 - 0.44 k/uL    Basophils Absolute 0.18 0.00 - 0.20 k/uL    Absolute Immature Granulocyte 0.18 0.00 - 0.30 k/uL   TSH    Collection Time: 09/21/22  1:42 PM   Result Value Ref Range    TSH 2.41 0.30 - 5.00 uIU/mL   Comprehensive Metabolic Panel    Collection Time: 09/21/22  1:55 PM   Result Value Ref Range    Glucose 106 (H) 70 - 99 mg/dL    BUN 16 8 - 23 mg/dL    Creatinine 1.10 (H) 0.50 - 0.90 mg/dL    Bun/Cre Ratio 15 9 - 20    Calcium 9.8 8.6 - 10.4 mg/dL    Sodium 138 135 - 144 mmol/L    Potassium 3.7 3.7 - 5.3 mmol/L    Chloride 100 98 - 107 mmol/L    CO2 28 20 - 31 mmol/L    Anion Gap 10 9 - 17 mmol/L    Alkaline Phosphatase 105 (H) 35 - 104 U/L    ALT 7 5 - 33 U/L    AST 12 <32 U/L    Total Bilirubin 0.4 0.3 - 1.2 mg/dL    Total Protein 8.8 (H) 6.4 - 8.3 g/dL    Albumin 3.3 (L) 3.5 - 5.2 g/dL    GFR Non- 49 (L) >60 mL/min    GFR  59 (L) >60 mL/min    GFR Comment         EKG 12 Lead    Collection Time: 09/21/22  3:05 PM   Result Value Ref Range    Ventricular Rate 82 BPM    Atrial Rate 82 BPM    P-R Interval 162 ms    QRS Duration 82 ms    Q-T Interval 374 ms    QTc Calculation (Bazett) 436 ms    P Axis 45 degrees    R Axis -59 degrees    T Axis 31 degrees       Imaging/Diagnostics:  JORDAN HERSON DIGITAL SCREEN BILATERAL    Result Date: 9/21/2022  No mammographic findings of malignancy. BI-RADS 1 BIRADS - CATEGORY 1 Negative, no evidence of malignancy. Normal interval follow-up is recommended in 12 months. OVERALL ASSESSMENT - NEGATIVE A letter of notification will be sent to the patient regarding the results. The [address] [place] [address] of Radiology recommends annual mammograms for women 40 years and older.      CT Lung Screening    Result Date: 9/21/2022  Interval development moderate-sized loculated left pleural effusion with LLL consolidation, most likely on the basis and infectious or inflammatory process. Underlying LLL pathology, however, not excluded. No suspicious nodule seen aerated lung. Following thoracentesis, depending upon results, follow-up CT chest in 3-6 months recommended (see below). LUNG RADS: Per ACR Lung-RADS Version 1.1 Category 3, Probably benign (Probably benign finding(s) - short term follow up suggested; includes nodules with a low likelihood of becoming a clinically active cancer). Management: 6 Month LDCT. RECOMMENDATIONS: If you would like to register your patient with the [facility] Nodule/Lung Cancer Screening Program, please contact the Nurse Navigator at [phone]). Assessment :      Hospital Problems             Last Modified POA    * (Principal) Community acquired pneumonia of left lower lobe of lung 9/21/2022 Yes    BMI 40.0-44.9, adult ([address] 9/21/2022 Yes    Essential hypertension 9/21/2022 Yes    Hypothyroidism 9/21/2022 Yes    Major depressive disorder, recurrent episode, mild ([address] 9/21/2022 Yes    Nicotine dependence, cigarettes, uncomplicated 1/89/5374 Yes       Plan:     Patient status inpatient in the  Med/Surge    Pneumonia  Azithromycin and Rocephin started in the emergency room  Administer supplemental O2 as needed  Gentle IV hydration  As needed breathing treatments  Obesity  Advised lifestyle modification  Hypertension  Continue home medications with holding parameters  Hypothyroidism  Continue home medications  Depression  Continue home medications  Nicotine dependence  Nicotine patch for tachycardia  Diabetes  Continue home medications  Monitor blood glucose levels AC/HS with sliding scale coverage. Adult diet  Monitor a.m. labs    Consultations:   IP CONSULT TO INTERNAL MEDICINE    Patient is admitted as inpatient status because of co-morbidities listed above, severity of signs and symptoms as outlined, requirement for current medical therapies and most importantly because of direct risk to patient if care not provided in a hospital setting. Expected length of stay > 48 hours. On this date 9/21/2022 I have spent 31 minutes reviewing previous notes, test results and face to face with the patient discussing the diagnosis and importance of compliance with the treatment plan as well as documenting on the day of the visit. At least 50% of the time documented was spent with the patient to provide counseling and/or coordination of care.       FABI Slade - CNP  9/21/2022  4:47 PM    Copy sent to Dr. Elayne Malcolm MD

## 2022-09-21 NOTE — ED NOTES
Pt c/o left upper chest pain and SOB. SOB is ongoing. Pt able to speak in full sentences. Initial SpO2 85% on RA - placed on 2L NC, SpO2 increased to 96%. Pt difficult historian - does state she sees a pulmonologist, a podiatrist and a hematologist. States she wears home O2 and has for the last 7-8 months. Pt not wanting IV started d/t just having blood drawn outpatient.       Raúl Romo RN  09/21/22 [address] [facility] Megan Ornelas RN  09/21/22 [id number]

## 2022-09-21 NOTE — ED NOTES
Pt back in bed per self. Reconnected to cardiac monitoring. HOB lowered for pt comfort. Pt denies further needs or complaints at this time. Awaiting bed placement; duration unknown.       Deya Sheets RN  09/21/22 [id number]

## 2022-09-22 LAB
ABSOLUTE EOS #: 0.29 K/UL (ref 0–0.44)
ABSOLUTE IMMATURE GRANULOCYTE: 0.09 K/UL (ref 0–0.3)
ABSOLUTE LYMPH #: 2.43 K/UL (ref 1.1–3.7)
ABSOLUTE MONO #: 1.39 K/UL (ref 0.1–1.2)
ANION GAP SERPL CALCULATED.3IONS-SCNC: 10 MMOL/L (ref 9–17)
BASOPHILS # BLD: 0 % (ref 0–2)
BASOPHILS ABSOLUTE: 0.05 K/UL (ref 0–0.2)
BUN BLDV-MCNC: 13 MG/DL (ref 8–23)
BUN/CREAT BLD: 13 (ref 9–20)
CALCIUM SERPL-MCNC: 9.1 MG/DL (ref 8.6–10.4)
CHLORIDE BLD-SCNC: 101 MMOL/L (ref 98–107)
CO2: 29 MMOL/L (ref 20–31)
CREAT SERPL-MCNC: 1 MG/DL (ref 0.5–0.9)
EOSINOPHILS RELATIVE PERCENT: 2 % (ref 1–4)
GFR AFRICAN AMERICAN: >60 ML/MIN
GFR NON-AFRICAN AMERICAN: 54 ML/MIN
GFR SERPL CREATININE-BSD FRML MDRD: ABNORMAL ML/MIN/{1.73_M2}
GLUCOSE BLD-MCNC: 102 MG/DL (ref 65–105)
GLUCOSE BLD-MCNC: 109 MG/DL (ref 65–105)
GLUCOSE BLD-MCNC: 131 MG/DL (ref 65–105)
GLUCOSE BLD-MCNC: 152 MG/DL (ref 70–99)
GLUCOSE BLD-MCNC: 98 MG/DL (ref 65–105)
HCT VFR BLD CALC: 40.2 % (ref 36.3–47.1)
HEMOGLOBIN: 11.6 G/DL (ref 11.9–15.1)
IMMATURE GRANULOCYTES: 1 %
LYMPHOCYTES # BLD: 18 % (ref 24–43)
MAGNESIUM: 1.9 MG/DL (ref 1.6–2.6)
MCH RBC QN AUTO: 24.5 PG (ref 25.2–33.5)
MCHC RBC AUTO-ENTMCNC: 28.9 G/DL (ref 28.4–34.8)
MCV RBC AUTO: 85 FL (ref 82.6–102.9)
MONOCYTES # BLD: 10 % (ref 3–12)
NRBC AUTOMATED: 0 PER 100 WBC
PDW BLD-RTO: 15.8 % (ref 11.8–14.4)
PLATELET # BLD: 409 K/UL (ref 138–453)
PMV BLD AUTO: 8.8 FL (ref 8.1–13.5)
POTASSIUM SERPL-SCNC: 3.3 MMOL/L (ref 3.7–5.3)
RBC # BLD: 4.73 M/UL (ref 3.95–5.11)
RBC # BLD: ABNORMAL 10*6/UL
SEG NEUTROPHILS: 69 % (ref 36–65)
SEGMENTED NEUTROPHILS ABSOLUTE COUNT: 9.66 K/UL (ref 1.5–8.1)
SODIUM BLD-SCNC: 140 MMOL/L (ref 135–144)
VITAMIN D 25-HYDROXY: 35.3 NG/ML
WBC # BLD: 13.9 K/UL (ref 3.5–11.3)

## 2022-09-22 PROCEDURE — 99232 SBSQ HOSP IP/OBS MODERATE 35: CPT | Performed by: STUDENT IN AN ORGANIZED HEALTH CARE EDUCATION/TRAINING PROGRAM

## 2022-09-22 PROCEDURE — 97167 OT EVAL HIGH COMPLEX 60 MIN: CPT

## 2022-09-22 PROCEDURE — 97535 SELF CARE MNGMENT TRAINING: CPT

## 2022-09-22 PROCEDURE — 97163 PT EVAL HIGH COMPLEX 45 MIN: CPT

## 2022-09-22 PROCEDURE — 84156 ASSAY OF PROTEIN URINE: CPT

## 2022-09-22 PROCEDURE — 6360000002 HC RX W HCPCS: Performed by: NURSE PRACTITIONER

## 2022-09-22 PROCEDURE — 6370000000 HC RX 637 (ALT 250 FOR IP): Performed by: NURSE PRACTITIONER

## 2022-09-22 PROCEDURE — 83735 ASSAY OF MAGNESIUM: CPT

## 2022-09-22 PROCEDURE — 6370000000 HC RX 637 (ALT 250 FOR IP): Performed by: STUDENT IN AN ORGANIZED HEALTH CARE EDUCATION/TRAINING PROGRAM

## 2022-09-22 PROCEDURE — 36415 COLL VENOUS BLD VENIPUNCTURE: CPT

## 2022-09-22 PROCEDURE — 86335 IMMUNFIX E-PHORSIS/URINE/CSF: CPT

## 2022-09-22 PROCEDURE — 82306 VITAMIN D 25 HYDROXY: CPT

## 2022-09-22 PROCEDURE — 97530 THERAPEUTIC ACTIVITIES: CPT

## 2022-09-22 PROCEDURE — 84155 ASSAY OF PROTEIN SERUM: CPT

## 2022-09-22 PROCEDURE — 82947 ASSAY GLUCOSE BLOOD QUANT: CPT

## 2022-09-22 PROCEDURE — 94761 N-INVAS EAR/PLS OXIMETRY MLT: CPT

## 2022-09-22 PROCEDURE — 84165 PROTEIN E-PHORESIS SERUM: CPT

## 2022-09-22 PROCEDURE — 2500000003 HC RX 250 WO HCPCS: Performed by: STUDENT IN AN ORGANIZED HEALTH CARE EDUCATION/TRAINING PROGRAM

## 2022-09-22 PROCEDURE — 85025 COMPLETE CBC W/AUTO DIFF WBC: CPT

## 2022-09-22 PROCEDURE — 84166 PROTEIN E-PHORESIS/URINE/CSF: CPT

## 2022-09-22 PROCEDURE — 97116 GAIT TRAINING THERAPY: CPT

## 2022-09-22 PROCEDURE — 2700000000 HC OXYGEN THERAPY PER DAY

## 2022-09-22 PROCEDURE — 2580000003 HC RX 258: Performed by: NURSE PRACTITIONER

## 2022-09-22 PROCEDURE — 1200000000 HC SEMI PRIVATE

## 2022-09-22 PROCEDURE — 80048 BASIC METABOLIC PNL TOTAL CA: CPT

## 2022-09-22 PROCEDURE — 94640 AIRWAY INHALATION TREATMENT: CPT

## 2022-09-22 PROCEDURE — 2580000003 HC RX 258: Performed by: STUDENT IN AN ORGANIZED HEALTH CARE EDUCATION/TRAINING PROGRAM

## 2022-09-22 RX ORDER — POTASSIUM CHLORIDE 20 MEQ/1
40 TABLET, EXTENDED RELEASE ORAL ONCE
Status: COMPLETED | OUTPATIENT
Start: 2022-09-22 | End: 2022-09-22

## 2022-09-22 RX ORDER — LABETALOL HYDROCHLORIDE 5 MG/ML
10 INJECTION, SOLUTION INTRAVENOUS ONCE
Status: COMPLETED | OUTPATIENT
Start: 2022-09-22 | End: 2022-09-22

## 2022-09-22 RX ORDER — OXYCODONE HYDROCHLORIDE 5 MG/1
5 TABLET ORAL EVERY 6 HOURS PRN
Status: DISCONTINUED | OUTPATIENT
Start: 2022-09-22 | End: 2022-09-23 | Stop reason: HOSPADM

## 2022-09-22 RX ORDER — HYDROCODONE BITARTRATE AND ACETAMINOPHEN 5; 325 MG/1; MG/1
1 TABLET ORAL ONCE
Status: COMPLETED | OUTPATIENT
Start: 2022-09-22 | End: 2022-09-22

## 2022-09-22 RX ADMIN — HYDROCHLOROTHIAZIDE 12.5 MG: 12.5 CAPSULE ORAL at 09:16

## 2022-09-22 RX ADMIN — ENOXAPARIN SODIUM 30 MG: 100 INJECTION SUBCUTANEOUS at 21:32

## 2022-09-22 RX ADMIN — AZITHROMYCIN MONOHYDRATE 500 MG: 250 TABLET ORAL at 18:10

## 2022-09-22 RX ADMIN — FOLIC ACID 1 MG: 5 INJECTION, SOLUTION INTRAMUSCULAR; INTRAVENOUS; SUBCUTANEOUS at 09:16

## 2022-09-22 RX ADMIN — LIOTHYRONINE SODIUM 10 MCG: 5 TABLET ORAL at 09:16

## 2022-09-22 RX ADMIN — POTASSIUM CHLORIDE 40 MEQ: 1500 TABLET, EXTENDED RELEASE ORAL at 09:17

## 2022-09-22 RX ADMIN — VENLAFAXINE HYDROCHLORIDE 150 MG: 75 CAPSULE, EXTENDED RELEASE ORAL at 09:17

## 2022-09-22 RX ADMIN — METFORMIN HYDROCHLORIDE 500 MG: 500 TABLET ORAL at 15:59

## 2022-09-22 RX ADMIN — CEFTRIAXONE SODIUM 1000 MG: 1 INJECTION, POWDER, FOR SOLUTION INTRAMUSCULAR; INTRAVENOUS at 16:01

## 2022-09-22 RX ADMIN — HYDROCODONE BITARTRATE AND ACETAMINOPHEN 1 TABLET: 5; 325 TABLET ORAL at 00:40

## 2022-09-22 RX ADMIN — METFORMIN HYDROCHLORIDE 500 MG: 500 TABLET ORAL at 09:17

## 2022-09-22 RX ADMIN — BUDESONIDE AND FORMOTEROL FUMARATE DIHYDRATE 2 PUFF: 160; 4.5 AEROSOL RESPIRATORY (INHALATION) at 08:37

## 2022-09-22 RX ADMIN — SODIUM CHLORIDE, PRESERVATIVE FREE 10 ML: 5 INJECTION INTRAVENOUS at 21:33

## 2022-09-22 RX ADMIN — SODIUM CHLORIDE, PRESERVATIVE FREE 10 ML: 5 INJECTION INTRAVENOUS at 09:24

## 2022-09-22 RX ADMIN — DORZOLAMIDE HYDROCHLORIDE 1 DROP: 20 SOLUTION/ DROPS OPHTHALMIC at 21:39

## 2022-09-22 RX ADMIN — SODIUM CHLORIDE: 9 INJECTION, SOLUTION INTRAVENOUS at 09:14

## 2022-09-22 RX ADMIN — CYANOCOBALAMIN TAB 1000 MCG 5000 MCG: 1000 TAB at 09:16

## 2022-09-22 RX ADMIN — LOSARTAN POTASSIUM 50 MG: 50 TABLET, FILM COATED ORAL at 09:17

## 2022-09-22 RX ADMIN — LEVOTHYROXINE SODIUM 75 MCG: 75 TABLET ORAL at 06:14

## 2022-09-22 RX ADMIN — ASPIRIN 81 MG: 81 TABLET, COATED ORAL at 09:17

## 2022-09-22 RX ADMIN — DORZOLAMIDE HYDROCHLORIDE 1 DROP: 20 SOLUTION/ DROPS OPHTHALMIC at 09:23

## 2022-09-22 RX ADMIN — LABETALOL HYDROCHLORIDE 10 MG: 5 INJECTION INTRAVENOUS at 14:55

## 2022-09-22 RX ADMIN — TIMOLOL MALEATE 1 DROP: 5 SOLUTION/ DROPS OPHTHALMIC at 21:39

## 2022-09-22 RX ADMIN — TIMOLOL MALEATE 1 DROP: 5 SOLUTION/ DROPS OPHTHALMIC at 09:23

## 2022-09-22 RX ADMIN — BUDESONIDE AND FORMOTEROL FUMARATE DIHYDRATE 2 PUFF: 160; 4.5 AEROSOL RESPIRATORY (INHALATION) at 20:41

## 2022-09-22 RX ADMIN — OXYCODONE 5 MG: 5 TABLET ORAL at 15:59

## 2022-09-22 ASSESSMENT — ENCOUNTER SYMPTOMS
CHEST TIGHTNESS: 0
EYE DISCHARGE: 0
DIARRHEA: 0
BACK PAIN: 0
ABDOMINAL DISTENTION: 0
SHORTNESS OF BREATH: 1
COUGH: 1
EYE ITCHING: 0
ABDOMINAL PAIN: 0
CONSTIPATION: 0
FACIAL SWELLING: 0
COLOR CHANGE: 0
APNEA: 0

## 2022-09-22 ASSESSMENT — PAIN SCALES - GENERAL
PAINLEVEL_OUTOF10: 9
PAINLEVEL_OUTOF10: 7
PAINLEVEL_OUTOF10: 9

## 2022-09-22 NOTE — CARE COORDINATION
Case Management Initial Discharge Plan  Cooper Yap,         Pablo Risk              Risk of Unplanned Readmission:  10             Met with:patient to discuss discharge plans. Information verified: address, contacts, phone number, , insurance Yes  PCP: Sandra Olguin MD  Date of last visit:     Insurance Provider: medicare    Discharge Planning  Current Residence:     Living Arrangements:  Children (lives with son)   Home has one stories/none stairs to climb  Support Systems:  Children  Current Services PTA:    Supplier: none  Patient able to perform ADL's:Independent  DME used to aid ambulation prior to admission: cane/during admissionTBD    Potential Assistance Needed:  [id number]: Horacio Andreas/[place]   Potential Assistance Purchasing Medications:  No  Does patient want to participate in local refill/ meds to beds program?  No    Patient agreeable to home care: Yes  Freedom of choice provided:  yes      Type of Home Care Services:  None  Patient expects to be discharged to:       Prior SNF/Rehab Placement and Facility: No  Agreeable to SNF/Rehab: No  [place] of choice provided: n/a   Evaluation: n/a    Expected Discharge date: Follow Up Appointment: Best Day/ Time:  (mid-week, mid-day)    Transportation provider: Family  Transportation arrangements needed for discharge: No    Discharge Plan: Met with patient to discuss dc options. Patient lives with son. He works during the day. Patient  has cane and O2. She does not know the supplier of O2. Discussed that she was weak with therapy and discussed SNF for rehab. She does not want SNF. She is agreeable to home care. She has no preference for agencies.   [facility]        Electronically signed by VINNY Mariano on 22 at 2:21 PM EDT

## 2022-09-22 NOTE — RT PROTOCOL NOTE
RT Inhaler-Nebulizer Bronchodilator Protocol Note    There is a bronchodilator order in the chart from a provider indicating to follow the RT Bronchodilator Protocol and there is an Initiate RT Inhaler-Nebulizer Bronchodilator Protocol order as well (see protocol at bottom of note). CXR Findings:  No results found. The findings from the last RT Protocol Assessment were as follows:   History Pulmonary Disease: Smoker 15 pack years or more  Respiratory Pattern: Regular pattern and RR 12-20 bpm  Breath Sounds: Clear breath sounds  Cough: Strong, spontaneous, non-productive  Indication for Bronchodilator Therapy: On home bronchodilators  Bronchodilator Assessment Score: 1    Aerosolized bronchodilator medication orders have been revised according to the RT Inhaler-Nebulizer Bronchodilator Protocol below. Respiratory Therapist to perform RT Therapy Protocol Assessment initially then follow the protocol. Repeat RT Therapy Protocol Assessment PRN for score 0-3 or on second treatment, BID, and PRN for scores above 3. No Indications - adjust the frequency to every 6 hours PRN wheezing or bronchospasm, if no treatments needed after 48 hours then discontinue using Per Protocol order mode. If indication present, adjust the RT bronchodilator orders based on the Bronchodilator Assessment Score as indicated below. Use Inhaler orders unless patient has one or more of the following: on home nebulizer, not able to hold breath for 10 seconds, is not alert and oriented, cannot activate and use MDI correctly, or respiratory rate 25 breaths per minute or more, then use the equivalent nebulizer order(s) with same Frequency and PRN reasons based on the score. If a patient is on this medication at home then do not decrease Frequency below that used at home.     0-3 - enter or revise RT bronchodilator order(s) to equivalent RT Bronchodilator order with Frequency of every 4 hours PRN for wheezing or increased work of breathing using Per Protocol order mode. 4-6 - enter or revise RT Bronchodilator order(s) to two equivalent RT bronchodilator orders with one order with BID Frequency and one order with Frequency of every 4 hours PRN wheezing or increased work of breathing using Per Protocol order mode. 7-10 - enter or revise RT Bronchodilator order(s) to two equivalent RT bronchodilator orders with one order with TID Frequency and one order with Frequency of every 4 hours PRN wheezing or increased work of breathing using Per Protocol order mode. 11-13 - enter or revise RT Bronchodilator order(s) to one equivalent RT bronchodilator order with QID Frequency and an Albuterol order with Frequency of every 4 hours PRN wheezing or increased work of breathing using Per Protocol order mode. Greater than 13 - enter or revise RT Bronchodilator order(s) to one equivalent RT bronchodilator order with every 4 hours Frequency and an Albuterol order with Frequency of every 2 hours PRN wheezing or increased work of breathing using Per Protocol order mode. RT to enter RT Home Evaluation for COPD & MDI Assessment order using Per Protocol order mode.     Electronically signed by Robert Yang RCP on 9/21/2022 at 9:15 PM

## 2022-09-22 NOTE — PROGRESS NOTES
Occupational Therapy  Facility/Department: [facility] CARE  Occupational Therapy Initial Assessment    Name: Yolanda Monson  : 1950  MRN: [id number]  Date of Service: 2022    Discharge Recommendations:  Patient would benefit from continued therapy after discharge      RN reports patient is medically stable for therapy treatment this date. Chart reviewed prior to treatment and patient is agreeable for therapy. All lines intact and patient positioned comfortably at end of treatment. All patient needs addressed prior to ending therapy session. Patient Diagnosis(es): The primary encounter diagnosis was Pneumonia of left lower lobe due to infectious organism. A diagnosis of Hypoxia was also pertinent to this visit. Past Medical History:  has a past medical history of Alkaline phosphatase elevation, Arthritis, Brain aneurysm, Bronchitis, Dyspepsia, Exercise counseling, Frequent urination, Headache(784.0), Hypertension, Leukocytosis, Major depressive disorder, Obesity, Stroke ([facility] [address] Thyroid disease, and Viral upper respiratory tract infection. Past Surgical History:  has a past surgical history that includes brain surgery; Tubal ligation; Hysterectomy; ovarian cyst removal; cyst removal; Appendectomy; Colonoscopy; and Cardiac surgery. Per h&P: Pt presented to ED on 22 for evaluation of midsternal chest pain upper abdominal pain has been going on for the past 6 days. Pain is a 10. Pain described as a intermittent sharp sensation. Denies cough but she is tachypneic upon arrival.  No nausea or vomiting. States she does have a history of hiatal hernia. History of tobacco use. Patient states she does not have a history of COPD or asthma but her pulmonologist placed her on oxygen at home 7 months ago. States she wears oxygen at home as needed. SPO2 on room air upon arrival 85%.   She was placed on 2 L nasal cannula and SPO2 is 96  Pt admitted for further medical management  Imaging revealed community acquired pneumonia of left lower lobe of lung & Pt admitted for further medical management    Assessment   Performance deficits / Impairments: Decreased functional mobility ; Decreased strength;Decreased endurance;Decreased ADL status; Decreased safe awareness;Decreased cognition;Decreased balance;Decreased sensation  Assessment: pt fatigues quickly with movement OOB.  Skilled OT is indicated to increase overall IND and safety with self-care and functional tasks to return to PLOF when appropriate  Prognosis: Good  Decision Making: High Complexity  REQUIRES OT FOLLOW-UP: Yes  Activity Tolerance  Activity Tolerance: Patient Tolerated treatment well;Patient limited by fatigue        Plan   Plan  Times per Week: 4-5x per week, 1-2x per day as cara  Current Treatment Recommendations: Strengthening, Balance training, Functional mobility training, Endurance training, Neuromuscular re-education, Safety education & training, Patient/Caregiver education & training, Equipment evaluation, education, & procurement, Self-Care / ADL, Positioning, Cognitive/Perceptual training     Restrictions  Restrictions/Precautions  Restrictions/Precautions: General Precautions, Fall Risk, Up as Tolerated  Required Braces or Orthoses?: No  Position Activity Restriction  Other position/activity restrictions: Up as tolerated, telemetry, cont pulse ox, [email or web address], ALARMS    Subjective   General  Chart Reviewed: Yes  Patient assessed for rehabilitation services?: Yes  Family / Caregiver Present: No  Subjective  Subjective: pt pleasant and cooperative for OT eval     Social/Functional History  Social/Functional History  Lives With: Son  Type of Home: Apartment  Home Layout: One level  Home Access:  (pt has one small lip to enter building)  Bathroom Shower/Tub: Tub/Shower unit  Bathroom Toilet: Standard  Bathroom Equipment: Shower chair  Home Equipment: Cane, Oxygen (wears home O2 as needed)  Has the patient had two or more falls in the past year or any fall with injury in the past year?: Yes (pt fell when she stumbled hitting wall by her kitchen, 2nd one tripped over something, 3rd time tripped over fan in her room(denies injury on any ot the falls))  Receives Help From: Family (pt has supportive son but he does work days)  ADL Assistance: Independent  Homemaking Assistance: Needs assistance (pt eats alot of microwaveable bowl sized meals for portion contron, Son cooks & does housekeeping)  Ambulation Assistance: Independent (uses cane all the time)  Transfer Assistance: Independent  Active : No  Patient's  Info: grandson(she still can drive but is only done minimally)  Mode of Transportation: Car  Occupation: Retired  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 With activity even on 2LO2 did drop to 81% & needed 2 minutes of pursed lip breathing to recover to 90% or better  Edema: Cordell ankles & r knee    Safety Devices  Type of Devices: Call light within reach; Chair alarm in place;Gait belt; Heels elevated for pressure relief;Patient at risk for falls; Left in chair;Nurse notified    Bed Mobility Training  Bed Mobility Training: Yes  Overall Level of Assistance: Minimum assistance  Interventions: Verbal cues; Tactile cues (Min cues for proper bed mob tech, use of bed rails, and assist with lines)    Pt is at risk for skin breakdown. Pt educated on pressure relief measures and was able to perform them with verbal cueing and repeat back education. Patient positioned appropriately after treatment with all high risk areas elevated or propped. Air mattress is inflated to appropriate level. Pt reports being comfortable at end of treatment.            Transfer Training  Transfer Training: Yes  Overall Level of Assistance: Minimum assistance;Assist X2  Interventions: Verbal cues; Weight shifting training/pressure relief; Safety awareness training; Tactile cues (Mod cues for proper hand placement on stable surface, controlled sit<>stand, upright posture, pacing, squaring self/AD to surface, RW safety/mgmt, assist with lines, all to inc safety/reduce fall risk)  Sit to Stand: Minimum assistance;Assist X2  Stand to Sit: Minimum assistance;Assist X2    Functional Mobility  Overall Level of Assistance: Minimum assistance; Moderate assistance;Assist X2 (pt completed functional mob from bed to door, and door to bathroom, and back to bedside chair. Pt visibly fatigued and SPO2 ~81%. Pt took 1-2 mins to recover with pursed lip breathing. Pt stood for ~7-8 mins total)  Interventions: Verbal cues; Tactile cues; Safety awareness training;Weight shifting training/pressure relief (Mod cues for RW safety/mgmt, upright posture, pacing, pursed lip breathing, assist with lines, scanning, all to inc safety/reduce fall risk)     AROM: Within functional limits  PROM: Within functional limits  Strength: Grossly decreased, non-functional (~4-/5)  Coordination: Within functional limits  Tone: Normal    ADL  Feeding: Modified independent   Grooming: Minimal assistance  Grooming Skilled Clinical Factors: Min A for standing balance while pt washed her hands at the sink. Pt unable to complete oral care as she was SOB and lightheaded. SPO2  ~81%  UE Bathing: Minimal assistance  LE Bathing: Minimal assistance  UE Dressing: Minimal assistance  UE Dressing Skilled Clinical Factors: to adjust hosp gown  LE Dressing: Contact guard assistance  LE Dressing Skilled Clinical Factors: pt donned B socks with CGA while seated EOB  Toileting: Minimal assistance  Toileting Skilled Clinical Factors: Min A for standing balance and clothing mgmt. Pt able to complete hygiene while seated with inc time and effort       Activity Tolerance  Activity Tolerance: Patient limited by endurance; Patient limited by fatigue        Vision  Vision: Impaired  Vision Exceptions: Wears glasses at all times (pt blind in left eye)  Hearing  Hearing: Within functional limits  Cognition  Overall Cognitive Status: Exceptions  Arousal/Alertness: Appropriate responses to stimuli  Following Commands: Follows one step commands with repetition; Follows one step commands with increased time  Attention Span: Attends with cues to redirect  Memory: Appears intact  Safety Judgement: Decreased awareness of need for assistance;Decreased awareness of need for safety  Problem Solving: Decreased awareness of errors;Assistance required to identify errors made;Assistance required to correct errors made;Assistance required to implement solutions  Insights: Decreased awareness of deficits  Initiation: Requires cues for some  Sequencing: Does not require cues  Orientation  Overall Orientation Status: Within Functional Limits                  Education Given To: Patient  Education Provided: Role of Therapy; ADL Adaptive Strategies; Fall Prevention Strategies;Transfer Training;Energy Conservation;Plan of Care  Education Provided Comments: OT POC, safety in function, call light/fall prevention, benefits of OOB activity, pursed lip breathing  Education Method: Demonstration;Verbal  Education Outcome: Continued education needed         Pt instructed to \"move what moves\", such as moving all joints in any comfortable direction, within ROM and pain tolerance. OT provided demonstration of AROM of neck, shoulders, elbows, forearms, wrists, fingers, knees, and ankles. OT recommended while pt is watching TV to use commercials as a guide to initiate AROM of one joint. Pt with Fair understanding of importance of AROM to promote circulation, maintain strength/endurance and to prevent overall stiffness.                     AM-PAC Score        AM-[facility] Inpatient Daily Activity Raw Score: 19 (09/22/22 1503)  -PAC Inpatient ADL T-Scale Score : 40.22 (09/22/22 1503)  ADL Inpatient CMS 0-100% Score: 42.8 (09/22/22 1503)  ADL Inpatient CMS G-Code Modifier : CK (09/22/22 1503)           Goals  Short Term Goals  Time Frame for Short term goals: by discharge, pt to demo  Short Term Goal 1: I/MI for bed mob tasks without bed rails  Short Term Goal 2: SBA for ADL transfers and functional mob with AD and Good safety  Short Term Goal 3: SBA for total body ADLs with AE as needed and Good safety  Short Term Goal 4: increase BUE strength by 1/2 grade to inc IND with self-care and be IND with HEP  Short Term Goal 5: pt/family to be IND with EC/WS, fall prevention tech, pressure relief education, discharge recommendations, with use of handouts as needed  Patient Goals   Patient goals : to go home       Therapy Time   Individual Concurrent Group Co-treatment   Time In 1311         Time Out 1404 (+10 chart review)         Minutes 63          Tx time: 53 min    Upon writer exit, call light within reach, pt retired to chair. All lines intact and patient positioned comfortably. All patient needs addressed prior to ending therapy session. Chart reviewed prior to treatment and patient is agreeable for therapy. RN reports patient is medically stable for therapy treatment this date. Co-treatment with PT warranted secondary to decreased safety and independence requiring 2 skilled therapy professionals to address individual discipline's goals. OT addressing preparation for ADL transfer, sitting balance for increased ADL performance, sitting/activity tolerance, functional reaching, environmental safety/scanning, fall prevention, functional mobility for ADL transfers, ability to sequence and follow directions, bed mobility tech, and functional UE strength.     Ileana Delarosa OTR/L

## 2022-09-22 NOTE — PROGRESS NOTES
Comprehensive Nutrition Assessment    Type and Reason for Visit:  Initial, Positive Nutrition Screen, Consult (14 to 23 pounds weight loss, decreased appetite, malnutrition score:3 ; dietitian consult: requesting to speak with dietitian)    Nutrition Recommendations/Plan:   ADULT DIET; Easy to Chew; 4 carb choices (60 gm/meal)  Ensure High Protein BID   Monitor PO intakes, ONS acceptance, weights, and labs      Malnutrition Assessment:  Malnutrition Status:  Mild malnutrition (09/22/22 1346)    Context:  Acute Illness     Findings of the 6 clinical characteristics of malnutrition:  Energy Intake:  75% or less of estimated energy requirements for 7 or more days  Weight Loss:  Greater than 5% over 1 month     Body Fat Loss:  Unable to assess     Muscle Mass Loss:  Unable to assess    Fluid Accumulation:  No significant fluid accumulation     Strength:  Not Performed    Nutrition Assessment:    Patient admission r/t community acquired pneumonia of left lower lobe of lung. Patient has hx stroke, brain aneurysm, HTN, depression. Patient reports 20# weight loss since 9/12/22 (10 days). She reports she wasn't eating well PTA but then reports she had % of breakfast today. She reports #. She has no N/V. She does seem out of it while talking to writer. She reports she used to drink Ensure products when she had her brain aneurysm but hasn't been drinking them lately. She agreed to Ensure High Protein BID. Patient reported not requesting to speak with dietitian. She does not have any questions about her diet and she is now eating OK. Will continue current diet and montior PO intakes, ONS acceptance, weights, and labs. Nutrition Related Findings:    Active bowel sounds. No edema. Labs reviewed: POC Glucose 102 (WNL).  Creatinine 1.00 (H), K+ 3.3 (L) Wound Type: None       Current Nutrition Intake & Therapies:    Average Meal Intake: %  Average Supplements Intake: None Ordered  ADULT DIET; Easy to Chew; 4 carb choices (60 gm/meal)  ADULT ORAL NUTRITION SUPPLEMENT; Breakfast, Dinner; Low Calorie/High Protein Oral Supplement    Anthropometric Measures:  Height: 5' 7\" (170.2 cm)  Ideal Body Weight (IBW): 135 lbs (61 kg)       Current Body Weight: 260 lb 3.2 oz (118 kg), 192.7 % IBW. Weight Source:  Other (Comment) (Actual; standing scale)  Current BMI (kg/m2): 40.7  Usual Body Weight: 285 lb (129.3 kg) (9/12/22)  % Weight Change (Calculated): -8.7  Weight Adjustment For: No Adjustment                 BMI Categories: Obese Class 3 (BMI 40.0 or greater)    Estimated Daily Nutrient Needs:  Energy Requirements Based On: Kcal/kg  Weight Used for Energy Requirements: Current  Energy (kcal/day): 0048-0897 kcal (13-14 kcal/kg)  Weight Used for Protein Requirements: Ideal  Protein (g/day): 122-130 gm protein (2.0-2.1 g/kg)       Nutrition Diagnosis:   Inadequate protein-energy intake related to inadequate protein-energy intake as evidenced by poor intake prior to admission, weight loss, weight loss greater than or equal to 5% in 1 month    Nutrition Interventions:   Food and/or Nutrient Delivery: Continue Current Diet, Start Oral Nutrition Supplement  Nutrition Education/Counseling: Education declined  Coordination of Nutrition Care: Continue to monitor while inpatient       Goals:     Goals: Meet at least 75% of estimated needs       Nutrition Monitoring and Evaluation:   Behavioral-Environmental Outcomes: None Identified  Food/Nutrient Intake Outcomes: Food and Nutrient Intake, Supplement Intake  Physical Signs/Symptoms Outcomes: Biochemical Data, Skin, Weight    Discharge Planning:    Continue current diet, Continue Oral Nutrition Supplement     Humaira Garcia, [address],   Office Number: [phone]

## 2022-09-22 NOTE — PROGRESS NOTES
Physical Therapy  Facility/Department: [facility] PROGRESSIVE CARE  Physical Therapy Initial Assessment    Name: Peggy Nowak  : 1950  MRN: [id number]  Date of Service: 2022    Discharge Recommendations:  Patient would benefit from continued therapy after discharge     Pt presented to ED on 22 for evaluation of midsternal chest pain upper abdominal pain has been going on for the past 6 days. Pain is a 10. Pain described as a intermittent sharp sensation. Denies cough but she is tachypneic upon arrival.  No nausea or vomiting. States she does have a history of hiatal hernia. History of tobacco use. Patient states she does not have a history of COPD or asthma but her pulmonologist placed her on oxygen at home 7 months ago. States she wears oxygen at home as needed. SPO2 on room air upon arrival 85%. She was placed on 2 L nasal cannula and SPO2 is 96  Pt admitted for further medical management  Imaging revealed community acquired pneumonia of left lower lobe of lung & Pt admitted for further medical management  RN reports patient is medically stable for therapy treatment this date. Chart reviewed prior to treatment and patient is agreeable for therapy. Patient Diagnosis(es): The primary encounter diagnosis was Pneumonia of left lower lobe due to infectious organism. A diagnosis of Hypoxia was also pertinent to this visit. Past Medical History:  has a past medical history of Alkaline phosphatase elevation, Arthritis, Brain aneurysm, Bronchitis, Dyspepsia, Exercise counseling, Frequent urination, Headache(784.0), Hypertension, Leukocytosis, Major depressive disorder, Obesity, Stroke ([address] Thyroid disease, and Viral upper respiratory tract infection. Past Surgical History:  has a past surgical history that includes brain surgery; Tubal ligation; Hysterectomy; ovarian cyst removal; cyst removal; Appendectomy; Colonoscopy; and Cardiac surgery.     Assessment   Body Structures, Functions, Activity Limitations Requiring Skilled Therapeutic Intervention: Decreased functional mobility ; Decreased ADL status; Decreased strength;Decreased safe awareness;Decreased endurance;Decreased posture  Assessment: Pt with deficits of bed mobility, transfers, ambulation, balance, safety awareness and endurance this session,  & required 2 assist for safe functional mobility, transfers & gait. , is globally deconditioned, & is decline compared to prior level of function. With current deficits, Pt at risk for falls & requires continued PT to maximize independence with functional mobility, balance, safety awareness & activity tolerance to improve aerobic capacity & overall tolerance of ADL's. Pt currently functioning below baseline with AM-PAC score of 14/24. Would suggest additional therapy at time of discharge to maximize long term outcomes and prevent re-admission. Therapy Prognosis: Good  Decision Making: High Complexity  Exam: ROM, MMT, functional mobility, activity tolerance, Balance, & [address] [facility] [address] [address] AM-[facility] 6 Clicks Basic Mobility  Clinical Presentation: evolving  Requires PT Follow-Up: Yes  Activity Tolerance  Activity Tolerance: Patient limited by endurance; Patient limited by fatigue     Plan   Plan  Plan: 5-7 times per week  Current Treatment Recommendations: Strengthening, Balance training, Functional mobility training, Transfer training, Endurance training, Gait training, Home exercise program, Safety education & training, Patient/Caregiver education & training  Safety Devices  Type of Devices: Call light within reach, Chair alarm in place, Gait belt, Heels elevated for pressure relief, Patient at risk for falls, Left in chair, Nurse notified     Restrictions  Restrictions/Precautions  Restrictions/Precautions: General Precautions, Fall Risk, Up as Tolerated  Required Braces or Orthoses?: No  Position Activity Restriction  Other position/activity restrictions: Up as tolerated, telemetry, cont pulse ox, [email or web address], ALARMS     Subjective   General  Chart Reviewed: Yes  Patient assessed for rehabilitation services?: Yes  Additional Pertinent Hx: CVA, thyroid disease, hypertension, obesity, and brain aneurysm. S  Response To Previous Treatment: Not applicable  Referring Practitioner: Pt agreeable to PT  General Comment  Comments: RN okays PT  Subjective  Subjective: Pt agreeable to PT         Social/Functional History  Social/Functional History  Lives With: Son  Type of Home: Apartment  Home Layout: One level  Home Access:  (pt has one small lip to enter building)  Bathroom Shower/Tub: Tub/Shower unit  Bathroom Toilet: Standard  Bathroom Equipment: Shower chair  Home Equipment: Brionna Mauro (wears home O2 as needed)  Has the patient had two or more falls in the past year or any fall with injury in the past year?: Yes (pt fell when she stumbled hitting wall by her kitchen, 2nd one tripped over something, 3rd time tripped over fan in her room(denies injury on any ot the falls))  Receives Help From: Family (pt has supportive son but he does work days)  ADL Assistance: Independent  Homemaking Assistance: Needs assistance (pt eats alot of microwaveable bowl sized meals for portion contron, Son cooks & does housekeeping)  Ambulation Assistance: Independent (uses cane all the time)  Transfer Assistance: Independent  Active : No  Patient's  Info: grandson(she still can drive but is only done minimally)  Mode of Transportation: Car  Occupation: Retired  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  Leisure & Hobbies: [employer]  [address]: Impaired  Vision Exceptions: Wears glasses at all times (pt blind in left eye)  Hearing  Hearing: Within functional limits    Cognition   Orientation  Overall Orientation Status: Within Functional Limits  Cognition  Overall Cognitive Status: Exceptions  Arousal/Alertness: Appropriate responses to stimuli  Following Commands:  Follows one step commands with repetition; Follows one step commands with increased time  Attention Span: Attends with cues to redirect  Memory: Appears intact  Safety Judgement: Decreased awareness of need for assistance;Decreased awareness of need for safety  Problem Solving: Decreased awareness of errors;Assistance required to identify errors made;Assistance required to correct errors made;Assistance required to implement solutions  Insights: Decreased awareness of deficits  Initiation: Requires cues for some  Sequencing: Does not require cues     Objective   Heart Rate: 67  Heart Rate Source: Monitor  BP: (!) 164/102  BP Location: Right upper arm  Patient Position: Supine  MAP (Calculated): 122.67  Resp: 18  SpO2: 94 %  O2 Device: Nasal cannula     Observation/Palpation  Posture: Fair  Observation: LUE IV, on 2LO2, resting saO2 91%, off O2 dropped to 86%. With activity even on 2LO2 did drop to 81% & needed 2 minutes of pursed lip breathing to recover to 90% or better  Edema: Cordell ankles & r knee  Gross Assessment  Sensation: Impaired (pt reports paresthesias in toes all the time)     AROM RLE (degrees)  RLE AROM: WFL  AROM LLE (degrees)  LLE AROM : WFL  AROM RUE (degrees)  RUE General AROM: see OT assessment  AROM LUE (degrees)  LUE General AROM: see OT assessment  Strength RLE  Comment: 4-/5 hip/knee  Strength LLE  Comment: 4-/5 hip  Strength RUE  Comment: see OT assessment  Strength LUE  Comment: see OT assessment        Bed Mobility Training  Bed Mobility Training: Yes  Overall Level of Assistance: Minimum assistance  Interventions: Verbal cues; Tactile cues (Min cues for proper bed mob tech, use of bed rails, and assist with lines)  Transfer Training  Transfer Training: Yes  Overall Level of Assistance: Minimum assistance;Assist X2  Interventions: Verbal cues; Weight shifting training/pressure relief; Safety awareness training; Tactile cues (Mod cues for proper hand placement on stable surface, controlled sit<>stand, upright posture, pacing, squaring self/AD to surface, RW safety/mgmt, assist with lines, all to inc safety/reduce fall risk)  Sit to Stand: Minimum assistance;Assist X2  Stand to Sit: Minimum assistance;Assist X2  Gait  Overall Level of Assistance: Minimum assistance; Moderate assistance;Assist X2 (pt completed functional mob from bed to door, and door to bathroom, and back to bedside chair. Pt visibly fatigued and SPO2 ~81%. Pt took 1-2 mins to recover with pursed lip breathing.)  Interventions: Verbal cues; Tactile cues; Safety awareness training;Weight shifting training/pressure relief (Mod cues for RW safety/mgmt, upright posture, pacing, pursed lip breathing, assist with lines, scanning, all to inc safety/reduce fall risk)  Bed mobility  Supine to Sit: Minimal assistance  Scooting: Minimal assistance  Bed Mobility Comments: MOD verbal instruction/tactile assist for UE hand placement on rail and proper log rolling tech + proper use of UB to scoot completely out to EOB with B foot placement to establish safe sitting balance, pursed lip breathing,  pacing, awareness/assist with line mgt,  with increased time needed all to increase safety & reduce fall risk  Transfers  Sit to Stand: Minimal Assistance;2 Person Assistance  Stand to sit: Minimal Assistance;2 Person Assistance  Bed to Chair: Minimal assistance;2 Person Assistance  Stand Pivot Transfers: Minimal Assistance;2 Person Assistance  Lateral Transfers: Minimal Assistance;2 Person Assistance  Comment: MOD VC + tactile assist on correct use of upper body for safe sit/stand + to back all way back to surface with walker until she feels touch behind legs & to ensure she reaches with UB support to arms of chair + extra assist for line management  Ambulation  Surface: level tile  Device: Rolling Walker  Assistance: Minimal assistance; Moderate assistance;2 Person assistance  Quality of Gait: step to pattern, MOD cues to keep walker close at all times/amb inside base of walker & standing balance to good with device to facilitate pt independence for performance of ADL's & functional mobility, & reduce fall risk  Short term goal 4: Pt able to tolerate 30 min of activity to include 15-20 reps of ex, NMR & functional mobility with device to facilitate activity tolerance to [facility]  Short term goal 5: Ed pt on home ex's, safety & energy conservation(planning, pacing, prioritizing & positioning), self regulating breathing techniques, fall prevention, & issue written pt education       Education  Patient Education  Education Given To: Patient  Education Provided: Role of Therapy;Plan of Care  Education Provided Comments: Pt educated on purpose of acute PT eval, importance of continued mobility throughout admission, general safety awareness, fall risk prevention, safe transfers & ambulation w/ RW, prevention of sedentary complications, and PT POC. Pt demonstrated FAIR carryover  Pt requires continued reinforcement of education. Therapy Time   Individual Concurrent Group Co-treatment   Time In 7659         Time Out 1405         Minutes 58             Additional 10 minutes for chart review    Treatment time: 50 minutes  Co-treatment with OT warranted secondary to decreased safety and independence requiring 2 skilled therapy professionals to address individual discipline's goals. PT addressing core control in transitions with bed mobility & transfers, seated/standing posture, pressure relief, seated & standing postural alignment and breathing techniques, safety/scanning, & fall prevention in ambulation techniques.         [address], PT

## 2022-09-22 NOTE — PROGRESS NOTES
Writer spoke with pt's son Raúl Melissa, per pt's permission, and provided general status update. Answered all questions, son satisfied.

## 2022-09-22 NOTE — PROGRESS NOTES
[facility]  Office: [address], DO, Julisa Sterrett, DO, Palak Beets, DO, Aisha Martin Blood, DO, Denisha Ochoa MD, Angie Mckee MD, Johnnie Claude, MD, Mirella Caicedo MD,  Johny Salgado MD, Truett Burkitt, MD, Gabriela Soriano, DO, Zabrina Mcgregor MD,  Karla Blake MD, Alexis Jimenez MD, Mariluz Lee, DO, Nelda Schaefer MD, Ute Seo MD, Sudheer Davison MD, Carlos Carty MD, Tatianna Sepulveda MD, Kelsey Lima MD, Darien Washington, DO, Lubna Vincent MD, Cathie Pino MD, Donato Guzman, CNP,  Kailee Brown, CNP, Yen Corona, CNP, Shama Wilcox, CNP,  Marissa Gunter, DNP, Marylen Net, CNP, Panfilo Polk, CNP, Ivy Inman, CNP, Sammi Hartleyt, CNP, Eliezer Heller, CNP, Jennifer Juarez PA-C, Erna Enciso, CNS, Ro Sanches, DNP, Sonny Geller, CNP, Caleb Mancuso, CNP, Josue Goins, [facility]    Progress Note    9/22/2022    3:03 PM    Name:   Nancy Cardoza  MRN:     [id number]     Acct:      [de-identified]   Room:   [address] [place] [address] Day:  1  Admit Date:  9/21/2022  2:24 PM    PCP:   Elbert Sanchez MD  Code Status:  Full Code    Subjective:     C/C:   Chief Complaint   Patient presents with    Chest Pain     Onset last Fri Interval History Status: not changed. Patietn seen and examined. Sitting comfortably in chair. Breathing better. Not back to baseline but improved. Still having some left sided pain. Brief History: This is a very pleasant 67year old female with past medical history of obesity, hypothyroidisim, HTN, Depression, CKD stage II presents with cough and shortenss of breath found to have PNA. Patient being treated with azithromycin and rocephin    Review of Systems:     Review of Systems   Constitutional:  Positive for fatigue. Negative for activity change, appetite change, chills and fever.    HENT:  Negative for congestion, ear pain, facial swelling and **OR** ondansetron, magnesium hydroxide, acetaminophen **OR** acetaminophen, albuterol, glucose, dextrose bolus **OR** dextrose bolus, glucagon (rDNA), dextrose    Data:     Past Medical History:   has a past medical history of Alkaline phosphatase elevation, Arthritis, Brain aneurysm, Bronchitis, Dyspepsia, Exercise counseling, Frequent urination, Headache(784.0), Hypertension, Leukocytosis, Major depressive disorder, Obesity, Stroke ([address] Thyroid disease, and Viral upper respiratory tract infection. Social History:   reports that she has been smoking cigarettes. She has a 25.00 pack-year smoking history. She has never used smokeless tobacco. She reports that she does not drink alcohol and does not use drugs. Family History:   Family History   Problem Relation Age of Onset    High Blood Pressure Mother     Diabetes Mother     Alcohol Abuse Father     High Blood Pressure Sister     Diabetes Sister     Cancer Sister         leukemia    Alcohol Abuse Sister     Thyroid Disease Other     Breast Cancer Sister         aunt     Cancer Maternal Aunt         breast       Vitals:  BP (!) 164/102   Pulse 67   Temp 99 °F (37.2 °C) (Oral)   Resp 18   Ht 5' 7\" (1.702 m)   Wt 260 lb 3.2 oz (118 kg)   SpO2 94%   BMI 40.75 kg/m²   Temp (24hrs), Av.7 °F (37.1 °C), Min:98.2 °F (36.8 °C), Max:99 °F (37.2 °C)    Recent Labs     22  0743 22  1159   POCGLU 124* 131* 102       I/O (24Hr):     Intake/Output Summary (Last 24 hours) at 2022 1503  Last data filed at 2022 7192  Gross per 24 hour   Intake 580 ml   Output 200 ml   Net 380 ml       Labs:  Hematology:  Recent Labs     22  1340 22  1342 22  0530   WBC 17.5* 17.5* 13.9*   RBC 5.18* 5.18* 4.73   HGB 12.8 12.8 11.6*   HCT 43.6 43.6 40.2   MCV 84.2 84.2 85.0   MCH 24.7* 24.7* 24.5*   MCHC 29.4 29.4 28.9   RDW 15.9* 15.9* 15.8*   * 525* 409   MPV 9.3 9.3 8.8     Chemistry:  Recent Labs 09/21/22  1340 09/21/22  1355 09/21/22  1615 09/22/22  0530    138  --  140   K 3.7 3.7  --  3.3*    100  --  101   CO2 28 28  --  29   GLUCOSE 106* 106*  --  152*   BUN 16 16  --  13   CREATININE 1.10* 1.10*  --  1.00*   MG 2.1  --   --  1.9   ANIONGAP 10 10  --  10   LABGLOM 49* 49*  --  54*   GFRAA 59* 59*  --  >60   CALCIUM 9.8 9.8  --  9.1   PROBNP 1,049*  --   --   --    TROPHS 23*  --  18*  --      Recent Labs     09/21/22  1340 09/21/22  1342 09/21/22  1355 09/21/22 2021 09/22/22  0719 09/22/22  0743 09/22/22  1159   PROT 8.8*  --  8.8*  --  7.0  --   --    LABALBU 3.3*  --  3.3*  --   --   --   --    TSH 2.41 2.41  --   --   --   --   --    AST 12  --  12  --   --   --   --    ALT 7  --  7  --   --   --   --    ALKPHOS 105*  --  105*  --   --   --   --    BILITOT 0.4  --  0.4  --   --   --   --    AMYLASE 35  --   --   --   --   --   --    LIPASE 17  --   --   --   --   --   --    POCGLU  --   --   --  124*  --  131* 102     ABG:No results found for: POCPH, PHART, PH, POCPCO2, UGA6DNC, PCO2, POCPO2, PO2ART, PO2, POCHCO3, DON3OEM, HCO3, NBEA, PBEA, BEART, BE, THGBART, THB, DZZ5EAJ, RWEN6KDM, K9DWLMOH, O2SAT, FIO2  Lab Results   Component Value Date/Time    SPECIAL LEFT HAND 20 ML 09/21/2022 04:30 PM     Lab Results   Component Value Date/Time    CULTURE NO GROWTH 12 HOURS 09/21/2022 04:30 PM       Radiology:  XR CHEST PORTABLE    Result Date: 9/21/2022  Interstitial prominence and bilateral pulmonary opacities concerning for edema or pneumonia. Bilateral pleural effusions. JORDAN HERSON DIGITAL SCREEN BILATERAL    Result Date: 9/21/2022  No mammographic findings of malignancy. BI-RADS 1 BIRADS - CATEGORY 1 Negative, no evidence of malignancy. Normal interval follow-up is recommended in 12 months. OVERALL ASSESSMENT - NEGATIVE A letter of notification will be sent to the patient regarding the results.  The [employer] of [employer] recommends annual mammograms for women 40 years and older.    CT Lung Screening    Result Date: 9/21/2022  Interval development moderate-sized loculated left pleural effusion with LLL consolidation, most likely on the basis and infectious or inflammatory process. Underlying LLL pathology, however, not excluded. No suspicious nodule seen aerated lung. Following thoracentesis, depending upon results, follow-up CT chest in 3-6 months recommended (see below). LUNG RADS: Per ACR Lung-RADS Version 1.1 Category 3, Probably benign (Probably benign finding(s) - short term follow up suggested; includes nodules with a low likelihood of becoming a clinically active cancer). Management: 6 Month LDCT. RECOMMENDATIONS: If you would like to register your patient with the [facility] Nodule/Lung Cancer Screening Program, please contact the Nurse Navigator at [phone]). Physical Examination:        Physical Exam  Constitutional:       General: She is not in acute distress. Appearance: She is obese. She is ill-appearing. HENT:      Head: Normocephalic and atraumatic. Right Ear: External ear normal.      Left Ear: External ear normal.      Nose: Nose normal.      Mouth/Throat:      Mouth: Mucous membranes are moist.   Eyes:      Pupils: Pupils are equal, round, and reactive to light. Cardiovascular:      Rate and Rhythm: Normal rate and regular rhythm. Heart sounds: Murmur heard. Pulmonary:      Effort: Tachypnea and prolonged expiration present. Breath sounds: Examination of the left-lower field reveals decreased breath sounds and rhonchi. Decreased breath sounds and rhonchi present. Abdominal:      General: Abdomen is flat. Palpations: Abdomen is soft. Musculoskeletal:      Cervical back: Neck supple. Right lower leg: No edema. Left lower leg: No edema. Skin:     General: Skin is warm and dry. Capillary Refill: Capillary refill takes less than 2 seconds. Neurological:      General: No focal deficit present. Mental Status: She is alert and oriented to person, place, and time. Psychiatric:         Mood and Affect: Mood normal.         Behavior: Behavior normal.       Assessment:        Hospital Problems             Last Modified POA    * (Principal) Community acquired pneumonia of left lower lobe of lung 9/21/2022 Yes    [facility] 40.0-44.9, [facility] 9/21/2022 Yes    Essential hypertension 9/21/2022 Yes    Hypothyroidism 9/21/2022 Yes    Major depressive disorder, recurrent episode, mild ([facility] 9/21/2022 Yes    Nicotine dependence, cigarettes, uncomplicated 5/44/6283 Yes    Prediabetes 9/21/2022 Yes       Plan:        PNA. Azithromycin, rocephin, wean down oxygen as tolerating.  Continue inhalers  Diabetes IIS  Hypothyroidisim, patient is on cytomel and synthroid at home  Continue cozaar and HCTZ, labetalol PRN  Depression venlafaxine  Follow up Vitamin d  Roxicodone added for pain    Julius Carl MD  9/22/2022  3:03 PM

## 2022-09-22 NOTE — PLAN OF CARE
BRONCHOSPASM/BRONCHOCONSTRICTION    IMPROVE  AERATION/BREATHSOUNDS  ADMINISTER BRONCHODILATOR THERAPY AS APPROPRIATE  ASSESS BREATH SOUNDS  PATIENT EDUCATION AS NEEDED PROVIDE ADEQUATE OXYGENATION WITH ACCEPTABLE SP02/ABG'S    [x]  IDENTIFY APPROPRIATE OXYGEN THERAPY  [x]   MONITOR SP02/ABG'S AS NEEDED   [x]   PATIENT EDUCATION AS NEEDED

## 2022-09-22 NOTE — PLAN OF CARE
Problem: Respiratory - Adult  Goal: Achieves optimal ventilation and oxygenation  9/22/2022 0835 by Deana Nieto RCP  Outcome: Progressing     Problem: Respiratory - Adult  Goal: Adequate oxygenation  Outcome: Progressing     Problem: Respiratory - Adult  Goal: Able to breathe comfortably  Outcome: Progressing

## 2022-09-22 NOTE — PROGRESS NOTES
Physician Progress Note      Stefani Thurston  CSN #:                  [id number]  :                       1950  ADMIT DATE:       2022 2:24 PM  [address] [place] DATE:  RESPONDING  PROVIDER #:        Kamila Melton MD          QUERY TEXT:    Pt admitted with pneumonia. Pt noted to have chest pain. If possible, please   document in the progress notes and discharge summary if you are evaluating   and/or treating any of the following: The medical record reflects the following:  Risk Factors: PNA, Home o2  Clinical Indicators: noted to be tachypneic on arrival, Spo2 of 85%, placed on   supplemental o2 with improvement to 96%, wear home o2 as needed, RR as high   as 30, sob  Treatment: supplemental o2    Thank you,  Malathi Valdes RN  Options provided:  -- Acute respiratory failure with hypoxia  -- Acute respiratory failure with hypercapnia  -- Acute respiratory failure with hypoxia and hypercapnia  -- Other - I will add my own diagnosis  -- Disagree - Not applicable / Not valid  -- Disagree - Clinically unable to determine / Unknown  -- Refer to Clinical Documentation Reviewer    PROVIDER RESPONSE TEXT:    This patient is in acute respiratory failure with hypoxia. Query created by:  Pablo Andrews on 2022 9:01 AM      Electronically signed by:  Kamila Melton MD 2022 2:43 PM

## 2022-09-23 VITALS
BODY MASS INDEX: 40.84 KG/M2 | DIASTOLIC BLOOD PRESSURE: 90 MMHG | OXYGEN SATURATION: 90 % | RESPIRATION RATE: 16 BRPM | SYSTOLIC BLOOD PRESSURE: 124 MMHG | HEART RATE: 90 BPM | HEIGHT: 67 IN | TEMPERATURE: 97.7 F | WEIGHT: 260.2 LBS

## 2022-09-23 LAB
ABSOLUTE EOS #: 0.31 K/UL (ref 0–0.44)
ABSOLUTE IMMATURE GRANULOCYTE: 0.09 K/UL (ref 0–0.3)
ABSOLUTE LYMPH #: 2.55 K/UL (ref 1.1–3.7)
ABSOLUTE MONO #: 1.45 K/UL (ref 0.1–1.2)
ALBUMIN (CALCULATED): 2.8 G/DL (ref 3.2–5.2)
ALBUMIN PERCENT: 40 % (ref 45–65)
ALPHA 1 PERCENT: 6 % (ref 3–6)
ALPHA 2 PERCENT: 13 % (ref 6–13)
ALPHA-1-GLOBULIN: 0.4 G/DL (ref 0.1–0.4)
ALPHA-2-GLOBULIN: 0.9 G/DL (ref 0.5–0.9)
ANION GAP SERPL CALCULATED.3IONS-SCNC: 7 MMOL/L (ref 9–17)
BASOPHILS # BLD: 0 % (ref 0–2)
BASOPHILS ABSOLUTE: 0.05 K/UL (ref 0–0.2)
BETA GLOBULIN: 0.9 G/DL (ref 0.5–1.1)
BETA PERCENT: 12 % (ref 11–19)
BUN BLDV-MCNC: 13 MG/DL (ref 8–23)
BUN/CREAT BLD: 13 (ref 9–20)
CALCIUM SERPL-MCNC: 9.1 MG/DL (ref 8.6–10.4)
CHLORIDE BLD-SCNC: 101 MMOL/L (ref 98–107)
CO2: 31 MMOL/L (ref 20–31)
CREAT SERPL-MCNC: 1 MG/DL (ref 0.5–0.9)
EKG ATRIAL RATE: 82 BPM
EKG P AXIS: 45 DEGREES
EKG P-R INTERVAL: 162 MS
EKG Q-T INTERVAL: 374 MS
EKG QRS DURATION: 82 MS
EKG QTC CALCULATION (BAZETT): 436 MS
EKG R AXIS: -59 DEGREES
EKG T AXIS: 31 DEGREES
EKG VENTRICULAR RATE: 82 BPM
EOSINOPHILS RELATIVE PERCENT: 3 % (ref 1–4)
GAMMA GLOBULIN %: 29 % (ref 9–20)
GAMMA GLOBULIN: 2 G/DL (ref 0.5–1.5)
GFR AFRICAN AMERICAN: >60 ML/MIN
GFR NON-AFRICAN AMERICAN: 54 ML/MIN
GFR SERPL CREATININE-BSD FRML MDRD: ABNORMAL ML/MIN/{1.73_M2}
GLUCOSE BLD-MCNC: 100 MG/DL (ref 65–105)
GLUCOSE BLD-MCNC: 90 MG/DL (ref 65–105)
GLUCOSE BLD-MCNC: 93 MG/DL (ref 65–105)
GLUCOSE BLD-MCNC: 95 MG/DL (ref 70–99)
HCT VFR BLD CALC: 38.5 % (ref 36.3–47.1)
HEMOGLOBIN: 11.3 G/DL (ref 11.9–15.1)
IMMATURE GRANULOCYTES: 1 %
LYMPHOCYTES # BLD: 20 % (ref 24–43)
MCH RBC QN AUTO: 24.8 PG (ref 25.2–33.5)
MCHC RBC AUTO-ENTMCNC: 29.4 G/DL (ref 28.4–34.8)
MCV RBC AUTO: 84.6 FL (ref 82.6–102.9)
MONOCYTES # BLD: 12 % (ref 3–12)
NRBC AUTOMATED: 0 PER 100 WBC
PATHOLOGIST: ABNORMAL
PDW BLD-RTO: 15.7 % (ref 11.8–14.4)
PLATELET # BLD: 408 K/UL (ref 138–453)
PMV BLD AUTO: 9.2 FL (ref 8.1–13.5)
POTASSIUM SERPL-SCNC: 4.3 MMOL/L (ref 3.7–5.3)
PROTEIN ELECTROPHORESIS, SERUM: ABNORMAL
RBC # BLD: 4.55 M/UL (ref 3.95–5.11)
RBC # BLD: ABNORMAL 10*6/UL
SEG NEUTROPHILS: 64 % (ref 36–65)
SEGMENTED NEUTROPHILS ABSOLUTE COUNT: 8.17 K/UL (ref 1.5–8.1)
SODIUM BLD-SCNC: 139 MMOL/L (ref 135–144)
TOTAL PROT. SUM,%: 100 % (ref 98–102)
TOTAL PROT. SUM: 7 G/DL (ref 6.3–8.2)
TOTAL PROTEIN: 7 G/DL (ref 6.4–8.3)
WBC # BLD: 12.6 K/UL (ref 3.5–11.3)

## 2022-09-23 PROCEDURE — 6360000002 HC RX W HCPCS: Performed by: NURSE PRACTITIONER

## 2022-09-23 PROCEDURE — 94761 N-INVAS EAR/PLS OXIMETRY MLT: CPT

## 2022-09-23 PROCEDURE — 80048 BASIC METABOLIC PNL TOTAL CA: CPT

## 2022-09-23 PROCEDURE — 6370000000 HC RX 637 (ALT 250 FOR IP): Performed by: STUDENT IN AN ORGANIZED HEALTH CARE EDUCATION/TRAINING PROGRAM

## 2022-09-23 PROCEDURE — 94640 AIRWAY INHALATION TREATMENT: CPT

## 2022-09-23 PROCEDURE — 6360000002 HC RX W HCPCS: Performed by: STUDENT IN AN ORGANIZED HEALTH CARE EDUCATION/TRAINING PROGRAM

## 2022-09-23 PROCEDURE — 97535 SELF CARE MNGMENT TRAINING: CPT

## 2022-09-23 PROCEDURE — 6370000000 HC RX 637 (ALT 250 FOR IP): Performed by: NURSE PRACTITIONER

## 2022-09-23 PROCEDURE — 2580000003 HC RX 258: Performed by: NURSE PRACTITIONER

## 2022-09-23 PROCEDURE — 2700000000 HC OXYGEN THERAPY PER DAY

## 2022-09-23 PROCEDURE — 97116 GAIT TRAINING THERAPY: CPT

## 2022-09-23 PROCEDURE — 82947 ASSAY GLUCOSE BLOOD QUANT: CPT

## 2022-09-23 PROCEDURE — 36415 COLL VENOUS BLD VENIPUNCTURE: CPT

## 2022-09-23 PROCEDURE — 97112 NEUROMUSCULAR REEDUCATION: CPT

## 2022-09-23 PROCEDURE — 99232 SBSQ HOSP IP/OBS MODERATE 35: CPT | Performed by: STUDENT IN AN ORGANIZED HEALTH CARE EDUCATION/TRAINING PROGRAM

## 2022-09-23 PROCEDURE — 97530 THERAPEUTIC ACTIVITIES: CPT

## 2022-09-23 PROCEDURE — 97110 THERAPEUTIC EXERCISES: CPT

## 2022-09-23 PROCEDURE — 85025 COMPLETE CBC W/AUTO DIFF WBC: CPT

## 2022-09-23 RX ORDER — BENZOCAINE/MENTHOL 6 MG-10 MG
LOZENGE MUCOUS MEMBRANE 2 TIMES DAILY
Status: DISCONTINUED | OUTPATIENT
Start: 2022-09-23 | End: 2022-09-23 | Stop reason: HOSPADM

## 2022-09-23 RX ORDER — AMOXICILLIN AND CLAVULANATE POTASSIUM 875; 125 MG/1; MG/1
1 TABLET, FILM COATED ORAL 2 TIMES DAILY
Qty: 10 TABLET | Refills: 0 | Status: SHIPPED | OUTPATIENT
Start: 2022-09-23 | End: 2022-09-28

## 2022-09-23 RX ORDER — GREEN TEA/HOODIA GORDONII 315-12.5MG
1 CAPSULE ORAL DAILY
Qty: 10 TABLET | Refills: 0 | Status: SHIPPED | OUTPATIENT
Start: 2022-09-23 | End: 2022-10-03

## 2022-09-23 RX ORDER — FUROSEMIDE 10 MG/ML
20 INJECTION INTRAMUSCULAR; INTRAVENOUS ONCE
Status: COMPLETED | OUTPATIENT
Start: 2022-09-23 | End: 2022-09-23

## 2022-09-23 RX ADMIN — MAGNESIUM HYDROXIDE 30 ML: 2400 SUSPENSION ORAL at 00:44

## 2022-09-23 RX ADMIN — LIOTHYRONINE SODIUM 10 MCG: 5 TABLET ORAL at 08:53

## 2022-09-23 RX ADMIN — HYDROCHLOROTHIAZIDE 12.5 MG: 12.5 CAPSULE ORAL at 08:54

## 2022-09-23 RX ADMIN — DORZOLAMIDE HYDROCHLORIDE 1 DROP: 20 SOLUTION/ DROPS OPHTHALMIC at 08:51

## 2022-09-23 RX ADMIN — SODIUM CHLORIDE, PRESERVATIVE FREE 10 ML: 5 INJECTION INTRAVENOUS at 08:50

## 2022-09-23 RX ADMIN — VENLAFAXINE HYDROCHLORIDE 150 MG: 75 CAPSULE, EXTENDED RELEASE ORAL at 08:54

## 2022-09-23 RX ADMIN — ASPIRIN 81 MG: 81 TABLET, COATED ORAL at 08:54

## 2022-09-23 RX ADMIN — TIMOLOL MALEATE 1 DROP: 5 SOLUTION/ DROPS OPHTHALMIC at 08:51

## 2022-09-23 RX ADMIN — METFORMIN HYDROCHLORIDE 500 MG: 500 TABLET ORAL at 17:35

## 2022-09-23 RX ADMIN — CYANOCOBALAMIN TAB 1000 MCG 5000 MCG: 1000 TAB at 08:53

## 2022-09-23 RX ADMIN — BUDESONIDE AND FORMOTEROL FUMARATE DIHYDRATE 2 PUFF: 160; 4.5 AEROSOL RESPIRATORY (INHALATION) at 09:43

## 2022-09-23 RX ADMIN — ENOXAPARIN SODIUM 30 MG: 100 INJECTION SUBCUTANEOUS at 08:52

## 2022-09-23 RX ADMIN — METFORMIN HYDROCHLORIDE 500 MG: 500 TABLET ORAL at 08:54

## 2022-09-23 RX ADMIN — FUROSEMIDE 20 MG: 10 INJECTION, SOLUTION INTRAMUSCULAR; INTRAVENOUS at 08:50

## 2022-09-23 RX ADMIN — LEVOTHYROXINE SODIUM 75 MCG: 75 TABLET ORAL at 06:11

## 2022-09-23 RX ADMIN — LOSARTAN POTASSIUM 50 MG: 50 TABLET, FILM COATED ORAL at 08:54

## 2022-09-23 RX ADMIN — AZITHROMYCIN MONOHYDRATE 500 MG: 250 TABLET ORAL at 17:34

## 2022-09-23 RX ADMIN — HYDROCORTISONE: 0.01 CREAM TOPICAL at 15:50

## 2022-09-23 ASSESSMENT — ENCOUNTER SYMPTOMS
BACK PAIN: 0
COLOR CHANGE: 0
CHEST TIGHTNESS: 0
ABDOMINAL PAIN: 0
SHORTNESS OF BREATH: 1
COUGH: 1
APNEA: 0
FACIAL SWELLING: 0
EYE ITCHING: 0
CONSTIPATION: 0
EYE DISCHARGE: 0
ABDOMINAL DISTENTION: 0
DIARRHEA: 0

## 2022-09-23 NOTE — PROGRESS NOTES
Transitions of [facility] Pharmacy Service   Medication Review    The patient's list of current home medications has been reviewed. Source(s) of information: Patient, [employer]. Based on information provided by the above source(s), I have updated the patient's home med list as described below. Please review the ACTION REQUESTED section of this note for any discrepancies on current hospital orders. I changed or updated the following medications on the patient's home medication list:  Discontinued None     Added None     Adjusted   Vitamin D  Ibuprofen     Other Notes None         Please feel free to call me with any questions about this encounter. Thank you. This note will be reviewed and co-signed by the [facility] Pharmacist.    Ashlee EllingtonD student  [facility]acy Service  Phone:  [phone]  Fax: [phone]      Electronically signed by Richard Mcpherson on 9/23/2022 at 11:49 AM         Prior to Admission medications    Medication Sig Start Date End Date Taking?  Authorizing Provider   Cyanocobalamin (VITAMIN B-12) 5000 MCG TBDP Take 5,000 mcg by mouth daily   Yes Historical Provider, MD   losartan-hydroCHLOROthiazide (HYZAAR) 50-12.5 MG per tablet Take 1 tablet by mouth daily 9/12/22  Yes Ada Dennis MD   levothyroxine (SYNTHROID) 75 MCG tablet Take 1 tablet by mouth Daily 9/12/22  Yes Ada Dennis MD   venlafaxine (EFFEXOR XR) 150 MG extended release capsule TAKE ONE CAPSULE BY MOUTH DAILY 9/12/22  Yes Ada Dennis MD   liothyronine (CYTOMEL) 5 MCG tablet Take 2 tablets by mouth daily 9/12/22 12/11/22 Yes Ada Dennis MD   BREO ELLIPTA 200-25 MCG/INH AEPB inhaler Inhale 1 puff into the lungs daily 7/25/22  Yes Historical Provider, MD   metFORMIN (GLUCOPHAGE) 500 MG tablet Take 1 tablet by mouth 2 times daily (with meals) 8/23/22 11/21/22 Yes Ada Dennis MD   dorzolamide-timolol (COSOPT) 22.3-6.8 MG/ML ophthalmic solution INSTILL 1 DROP IN RIGHT EYE TWICE DAILY 2/27/21  Yes Historical Provider, MD   Cholecalciferol (VITAMIN D) 2000 UNITS CAPS capsule Take 2,000 Units by mouth daily   Yes Historical Provider, MD   aspirin 81 MG tablet Take 81 mg by mouth daily.    Yes Historical Provider, MD   ibuprofen (ADVIL;MOTRIN) 200 MG tablet Take 400 mg by mouth as needed for Pain    Historical Provider, MD

## 2022-09-23 NOTE — DISCHARGE INSTR - COC
Continuity of Care Form    Patient Name: Lizeth Mims   :  1950  MRN:  [id number]    Admit date:  2022  Discharge date:  2022    Code Status Order: Full Code   Advance Directives:     Admitting Physician:  Isabella Ordaz MD  PCP: Petty Bae MD    Discharging Nurse: [address] [place] [address] Unit/Room#: 1002/1002-02  Discharging Unit Phone Number: [phone]    Emergency Contact:   Extended Emergency Contact Information  Primary Emergency Contact: Savannah Mcdermott [address] Phone: [phone]  Mobile Phone: [phone]  Relation: Grandchild    Past Surgical History:  Past Surgical History:   Procedure Laterality Date    APPENDECTOMY      BRAIN SURGERY      for Cerebral aneurysm    CARDIAC SURGERY      cardiac catheterization no stents    COLONOSCOPY      CYST REMOVAL      hand and eye    HYSTERECTOMY (CERVIX STATUS UNKNOWN)      OVARIAN CYST REMOVAL      TUBAL LIGATION         Immunization History:   Immunization History   Administered Date(s) Administered    COVID-19, PFIZER GRAY top, DO NOT Dilute, (age 15 y+), IM, 30 mcg/0.3 mL 2022, 2022    COVID-19, PFIZER PURPLE top, DILUTE for use, (age 15 y+), 30mcg/0.3mL 2021, 2021    Pneumococcal Conjugate 13-valent Monique Estrada) 10/01/2008    Zoster Live (Zostavax) 2016       Active Problems:  Patient Active Problem List   Diagnosis Code    BMI 40.0-44.9, adult ([facility] [address] Z68.41    Essential hypertension I10    Osteoarthritis, hip, bilateral M16.0    Primary osteoarthritis of both knees M17.0    Hemoglobin A1c above reference range R73.09    Vitamin D deficiency E55.9    Lumbar stenosis M48.061    Post-menopausal Z78.0    Fatigue R53.83    Hypothyroidism E03.9    Major depressive disorder, recurrent episode, mild ([facility] [address] F33.0    Family history of breast cancer Z80.3    Postmenopausal Z78.0    DDD (degenerative disc disease), lumbar M51.36    Osteoarthritis of spine with radiculopathy, lumbar region M47.26    Encounter for breast cancer screening using non-mammogram modality Z12.39    Renal insufficiency N28.9    Nicotine dependence, cigarettes, uncomplicated Y61.074    Prediabetes R73.03    Lump of right breast N63.10    History of abnormal mammogram Z87.898    Positive depression screening Z13.31    Calculus of gallbladder without cholecystitis without obstruction K80.20    Alkaline phosphatase elevation R74.8    Cervical radiculopathy M54.12    Chronic left shoulder pain M25.512, G89.29    On potassium wasting diuretic therapy Z79.899    Osteoarthritis of spine with radiculopathy, cervical region M47.22    Rhinosinusitis J31.0, J32.9    Chronic bronchitis (HCC) J42    Acute pain of left shoulder M25.512    Atypical nevi D22.9    Osteoarthritis of left knee M17.12    Localized edema R60.0    Hiatal hernia K44.9    Osteoarthritis of cervical spine M47.812    Leukocytosis D72.829    Insomnia G47.00    High triglycerides E78.1    Dizziness R42    Left hip pain M25.552    Personal history of tobacco use Z87.891    Need for pneumococcal vaccination Z23    Need for shingles vaccine Z23    Need for zoster vaccine Z23    Facet arthritis, degenerative, lumbar spine M47.816    Vertebrogenic low back pain M54.51    Dyslipidemia E78.5    Low serum vitamin B12 E53.8    Dyspepsia R10.13    Left ankle swelling M25.472    Morbid obesity (HCC) E66.01    Hyperproteinemia E88.09    Lumbosacral spondylosis without myelopathy M47.817    Essential (hemorrhagic) thrombocythemia (HCC) D47.3    Chronic renal disease, stage III ([facility] [address] [714812] N18.30    Community acquired pneumonia of left lower lobe of lung J18.9       Isolation/Infection:   Isolation            No Isolation          Patient Infection Status       Infection Onset Added Last Indicated Last Indicated By Review Planned Expiration Resolved Resolved By    None active    Resolved    COVID-19 (Rule Out) 09/21/22 09/21/22 09/21/22 COVID-19, Rapid (Ordered) 09/21/22 Rule-Out Test Resulted            Nurse Assessment:  Last Vital Signs: /63   Pulse 70   Temp 97.5 °F (36.4 °C) (Oral)   Resp 18   Ht 5' 7\" (1.702 m)   Wt 260 lb 3.2 oz (118 kg)   SpO2 (!) 89%   BMI 40.75 kg/m²     Last documented pain score (0-10 scale): Pain Level: 7  Last Weight:   Wt Readings from Last 1 Encounters:   09/21/22 260 lb 3.2 oz (118 kg)     Mental Status:  oriented, alert, and thought processes intact    IV Access:  - None    Nursing Mobility/ADLs:  Walking   Independent  Transfer  Independent  Bathing  Independent  Dressing  Independent  Toileting  Independent  Feeding  [address]  Independent  Med Delivery   whole    Wound Care Documentation and Therapy:        Elimination:  Continence: Bowel: Yes  Bladder: Yes  Urinary Catheter: None   Colostomy/Ileostomy/Ileal Conduit: No       Date of Last BM: 09/22/2022    Intake/Output Summary (Last 24 hours) at 9/23/2022 1425  Last data filed at 9/23/2022 0057  Gross per 24 hour   Intake --   Output 500 ml   Net -500 ml     I/O last 3 completed shifts: In: 580 [I.V.:580]  Out: 700 [Urine:700]    Safety Concerns:      At Risk for Falls    Impairments/Disabilities:      Vision    Nutrition Therapy:  Current Nutrition Therapy:   - Oral Diet:  General and Carb Control 4 carbs/meal (1800kcals/day)    Routes of Feeding: Oral  Liquids: No Restrictions  Daily Fluid Restriction: no  Last Modified Barium Swallow with Video (Video Swallowing Test): not done    Treatments at the Time of Hospital Discharge:   Respiratory Treatments: n/a  Oxygen Therapy:  {Therapy; copd oxygen:14933}  Ventilator:    - No ventilator support    Rehab Therapies: Physical Therapy and Occupational Therapy  Weight Bearing Status/Restrictions: No weight bearing restrictions  Other Medical Equipment (for information only, NOT a DME order):  cane and walker  Other Treatments:   SN  Medication teaching     Patient's personal belongings (please select all that are sent with patient):  Todd    RN SIGNATURE:  Electronically signed by Gregg Galloway RN on 9/23/22 at 4:54 PM EDT    CASE MANAGEMENT/SOCIAL WORK SECTION    Inpatient Status Date: ***    Readmission Risk Assessment Score:  Readmission Risk              Risk of Unplanned Readmission:  11           Discharging to Facility/ Agency   Name: [facility]  Address:[address] [place] [address]  Phone: [phone]  Fax: Home care agency will pull data electronically. / signature: Electronically signed by Haris Marr RN on 9/23/22 at 2:43 PM EDT    PHYSICIAN SECTION    Prognosis: Guarded    Condition at Discharge: Stable    Rehab Potential (if transferring to Rehab): Fair    Recommended Labs or Other Treatments After Discharge: PT, OT, nursing evaluation and treatment, continue Antibiotics for PNA, chest x-ray as outapteint    Physician Certification: I certify the above information and transfer of Birt Kehr  is necessary for the continuing treatment of the diagnosis listed and that she requires Home Care for less 30 days.      Update Admission H&P: No change in H&P    PHYSICIAN SIGNATURE:  Electronically signed by Shira You MD on 9/23/22 at 2:30 PM EDT

## 2022-09-23 NOTE — CARE COORDINATION
DC Planning    Pt walker has not been delivered yet-she is ok with delivery to home-informed it may not be till Monday-she is ok with that-she does have a cane and uses normally.

## 2022-09-23 NOTE — PROGRESS NOTES
[facility]  Office: [address], DO, Dean Pinto, DO, Nam Patrick, DO, [place] Matscorin Blood, DO, Velton Habermann, MD, Marleny Grissom MD, Adriana Vidal MD, Harpal Serrano MD,  Irma Sanchez MD, Jack Dumont MD, Latrice Leroy, DO, Anh Patrick MD,  Caron Trent MD, Shreya Jade MD, Cathie Varner DO, Tomeka Rosado MD, Susan Hi MD, Magalys Diane MD, Julia Sanderson MD, Benjy Huggins MD, Donis Salvador MD, Rod Masters, DO, Ashlee Vega MD, Robert Devi MD, Hank Coronel, CNP,  Ghazala Marc, CNP, Yelitza Kelly, CNP, Yuly Holman, CNP,  Lilian Jasso, [facility], Mayte Vernon, CNP, Bay Tristan, CNP, Praneeth Nolasco, CNP, Rossana Holden, CNP, Gwendolyn Kiser, CNP, Frankie Phleoralia, PA-C, Falguni Amaya, CNS, Terri Chang, [facility], Toni Drew, CNP, Luis Jimenez, CNP, Ap Norris, [address]    Progress Note    9/23/2022    1:52 PM    Name:   Yolanda Monson  MRN:     [id number]     Acct:      [de-identified]   Room:   [address] [place] [address] Day:  2  Admit Date:  9/21/2022  2:24 PM    PCP:   Eneida Hoover MD  Code Status:  Full Code    Subjective:     C/C:   Chief Complaint   Patient presents with    Chest Pain     Onset last Fri     Interval History Status: not changed. Patietn seen and examined. Resting comfortably in chair. Cough improved. Some itching on her back but othersies ok. Resting comfortably on oxygen. Brief History: This is a very pleasant 67year old female with past medical history of obesity, hypothyroidisim, HTN, Depression, CKD stage II presents with cough and shortenss of breath found to have PNA. Patient being treated with azithromycin and rocephin    Review of Systems:     Review of Systems   Constitutional:  Positive for fatigue. Negative for activity change, appetite change, chills and fever.    HENT:  Negative for congestion, ear pain, facial swelling and mouth sores. Eyes:  Negative for discharge and itching. Respiratory:  Positive for cough and shortness of breath. Negative for apnea and chest tightness. Cardiovascular:  Negative for chest pain and leg swelling. Gastrointestinal:  Negative for abdominal distention, abdominal pain, constipation and diarrhea. Endocrine: Negative for cold intolerance, polyphagia and polyuria. Genitourinary:  Negative for difficulty urinating, dysuria and flank pain. Musculoskeletal:  Negative for arthralgias, back pain and joint swelling. Skin:  Negative for color change and wound. Neurological:  Negative for dizziness, seizures, light-headedness and headaches. Psychiatric/Behavioral:  Negative for agitation, behavioral problems and self-injury. Medications: Allergies:     Allergies   Allergen Reactions    Aspirin       325mg aspirin gives stomach cramps, but tolerates 81mg     Baclofen Itching    Wellbutrin [Bupropion] Itching       Current Meds:   Scheduled Meds:    hydrocortisone-aloe   Topical BID    aspirin  81 mg Oral Daily    budesonide-formoterol  2 puff Inhalation BID    levothyroxine  75 mcg Oral Daily    liothyronine  10 mcg Oral Daily    metFORMIN  500 mg Oral BID WC    venlafaxine  150 mg Oral Daily with breakfast    sodium chloride flush  5-40 mL IntraVENous 2 times per day    cefTRIAXone (ROCEPHIN) IV  1,000 mg IntraVENous Q24H    And    azithromycin  500 mg Oral Q24H    insulin lispro  0-4 Units SubCUTAneous TID WC    insulin lispro  0-4 Units SubCUTAneous Nightly    nicotine  1 patch TransDERmal Daily    enoxaparin  30 mg SubCUTAneous BID    dorzolamide  1 drop Right Eye BID    And    timolol  1 drop Right Eye BID    losartan  50 mg Oral Daily    And    hydroCHLOROthiazide  12.5 mg Oral Daily    vitamin B-12  5,000 mcg Oral Daily     Continuous Infusions:    sodium chloride      dextrose       PRN Meds: oxyCODONE, sodium chloride flush, sodium chloride, ondansetron **OR** ondansetron, magnesium hydroxide, acetaminophen **OR** acetaminophen, albuterol, glucose, dextrose bolus **OR** dextrose bolus, glucagon (rDNA), dextrose    Data:     Past Medical History:   has a past medical history of Alkaline phosphatase elevation, Arthritis, Brain aneurysm, Bronchitis, Dyspepsia, Exercise counseling, Frequent urination, Headache(784.0), Hypertension, Leukocytosis, Major depressive disorder, Obesity, Stroke ([address] Thyroid disease, and Viral upper respiratory tract infection. Social History:   reports that she has been smoking cigarettes. She has a 25.00 pack-year smoking history. She has never used smokeless tobacco. She reports that she does not drink alcohol and does not use drugs. Family History:   Family History   Problem Relation Age of Onset    High Blood Pressure Mother     Diabetes Mother     Alcohol Abuse Father     High Blood Pressure Sister     Diabetes Sister     Cancer Sister         leukemia    Alcohol Abuse Sister     Thyroid Disease Other     Breast Cancer Sister         aunt     Cancer Maternal Aunt         breast       Vitals:  /63   Pulse 70   Temp 97.5 °F (36.4 °C) (Oral)   Resp 18   Ht 5' 7\" (1.702 m)   Wt 260 lb 3.2 oz (118 kg)   SpO2 (!) 89%   BMI 40.75 kg/m²   Temp (24hrs), Av.7 °F (36.5 °C), Min:97.3 °F (36.3 °C), Max:98.2 °F (36.8 °C)    Recent Labs     22  1644 22  0803 22  1121   POCGLU 109* 98 100 93         I/O (24Hr):     Intake/Output Summary (Last 24 hours) at 2022 1352  Last data filed at 2022 0057  Gross per 24 hour   Intake --   Output 500 ml   Net -500 ml         Labs:  Hematology:  Recent Labs     22  1342 22  0530 22  0503   WBC 17.5* 13.9* 12.6*   RBC 5.18* 4.73 4.55   HGB 12.8 11.6* 11.3*   HCT 43.6 40.2 38.5   MCV 84.2 85.0 84.6   MCH 24.7* 24.5* 24.8*   MCHC 29.4 28.9 29.4   RDW 15.9* 15.8* 15.7*   * 409 408   MPV 9.3 8.8 9.2       Chemistry:  Recent Labs     22  2966 09/21/22  1355 09/21/22  1615 09/22/22  0530 09/23/22  0503    138  --  140 139   K 3.7 3.7  --  3.3* 4.3    100  --  101 101   CO2 28 28  --  29 31   GLUCOSE 106* 106*  --  152* 95   BUN 16 16  --  13 13   CREATININE 1.10* 1.10*  --  1.00* 1.00*   MG 2.1  --   --  1.9  --    ANIONGAP 10 10  --  10 7*   LABGLOM 49* 49*  --  54* 54*   GFRAA 59* 59*  --  >60 >60   CALCIUM 9.8 9.8  --  9.1 9.1   PROBNP 1,049*  --   --   --   --    TROPHS 23*  --  18*  --   --        Recent Labs     09/21/22  1340 09/21/22  1342 09/21/22  1355 09/21/22 2021 09/22/22  0719 09/22/22  0743 09/22/22  1159 09/22/22  1644 09/22/22 2007 09/23/22  0803 09/23/22  1121   PROT 8.8*  --  8.8*  --  7.0  --   --   --   --   --   --    LABALBU 3.3*  --  3.3*  --   --   --   --   --   --   --   --    TSH 2.41 2.41  --   --   --   --   --   --   --   --   --    AST 12  --  12  --   --   --   --   --   --   --   --    ALT 7  --  7  --   --   --   --   --   --   --   --    ALKPHOS 105*  --  105*  --   --   --   --   --   --   --   --    BILITOT 0.4  --  0.4  --   --   --   --   --   --   --   --    AMYLASE 35  --   --   --   --   --   --   --   --   --   --    LIPASE 17  --   --   --   --   --   --   --   --   --   --    POCGLU  --   --   --    < >  --  131* 102 109* 98 100 93    < > = values in this interval not displayed. ABG:No results found for: POCPH, PHART, PH, POCPCO2, AHG6IXW, PCO2, POCPO2, PO2ART, PO2, POCHCO3, VCM8KGS, HCO3, NBEA, PBEA, BEART, BE, THGBART, THB, WLP6HMU, RZLB4EOT, C1XEODFY, O2SAT, FIO2  Lab Results   Component Value Date/Time    SPECIAL LEFT HAND 20 ML 09/21/2022 04:30 PM     Lab Results   Component Value Date/Time    CULTURE NO GROWTH 1 DAY 09/21/2022 04:30 PM       Radiology:  XR CHEST PORTABLE    Result Date: 9/21/2022  Interstitial prominence and bilateral pulmonary opacities concerning for edema or pneumonia. Bilateral pleural effusions.      JORDAN HERSON DIGITAL SCREEN BILATERAL    Result Date: 9/21/2022  No mammographic findings of malignancy. BI-RADS 1 BIRADS - CATEGORY 1 Negative, no evidence of malignancy. Normal interval follow-up is recommended in 12 months. OVERALL ASSESSMENT - NEGATIVE A letter of notification will be sent to the patient regarding the results. The [employer] of Radiology recommends annual mammograms for women 40 years and older. CT Lung Screening    Result Date: 9/21/2022  Interval development moderate-sized loculated left pleural effusion with LLL consolidation, most likely on the basis and infectious or inflammatory process. Underlying LLL pathology, however, not excluded. No suspicious nodule seen aerated lung. Following thoracentesis, depending upon results, follow-up CT chest in 3-6 months recommended (see below). LUNG RADS: Per ACR Lung-RADS Version 1.1 Category 3, Probably benign (Probably benign finding(s) - short term follow up suggested; includes nodules with a low likelihood of becoming a clinically active cancer). Management: 6 Month LDCT. RECOMMENDATIONS: If you would like to register your patient with the [facility] Nodule/Lung Cancer Screening Program, please contact the Nurse Navigator at [phone] Physical Examination:        Physical Exam  Constitutional:       General: She is not in acute distress. Appearance: She is obese. She is ill-appearing. HENT:      Head: Normocephalic and atraumatic. Right Ear: External ear normal.      Left Ear: External ear normal.      Nose: Nose normal.      Mouth/Throat:      Mouth: Mucous membranes are moist.   Eyes:      Pupils: Pupils are equal, round, and reactive to light. Cardiovascular:      Rate and Rhythm: Normal rate and regular rhythm. Heart sounds: Murmur heard. Pulmonary:      Effort: Tachypnea and prolonged expiration present. Breath sounds: Examination of the left-lower field reveals decreased breath sounds and rhonchi. Decreased breath sounds and rhonchi present.    Abdominal: General: Abdomen is flat. Palpations: Abdomen is soft. Musculoskeletal:      Cervical back: Neck supple. Right lower leg: No edema. Left lower leg: No edema. Skin:     General: Skin is warm and dry. Capillary Refill: Capillary refill takes less than 2 seconds. Neurological:      General: No focal deficit present. Mental Status: She is alert and oriented to person, place, and time. Psychiatric:         Mood and Affect: Mood normal.         Behavior: Behavior normal.       Assessment:        Hospital Problems             Last Modified POA    * (Principal) Community acquired pneumonia of left lower lobe of lung 9/21/2022 Yes    BMI 40.0-44.9, adult ([facility] [address] 9/21/2022 Yes    Essential hypertension 9/21/2022 Yes    Hypothyroidism 9/21/2022 Yes    Major depressive disorder, recurrent episode, mild ([facility] [address] 9/21/2022 Yes    Nicotine dependence, cigarettes, uncomplicated 2/23/2277 Yes    Prediabetes 9/21/2022 Yes     Plan:        PNA. Azithromycin, rocephin, wean down oxygen as tolerating. Continue inhalers  Diabetes IIS  Hypothyroidisim, patient is on cytomel and synthroid at home  Continue cozaar and HCTZ, labetalol PRN  Depression venlafaxine  Follow up Vitamin d  Roxicodone for pain  Discharge planning possibly this afternoon, may need assistance with ride.     Kamila Melton MD  9/23/2022  1:52 PM

## 2022-09-23 NOTE — PROGRESS NOTES
Occupational Therapy  Facility/Department: [facility] PROGRESSIVE CARE  Rehabilitation Occupational Therapy Daily Treatment Note    Date: 22  Patient Name: Carlitos Welch       Room: 2351/7221-99  MRN: [id number]  Account: [de-identified]   : 1950  (77 y.o.) Gender: female        Due to recent hospitalization and medical condition, pt would benefit from additional intermittent skilled therapy at time of discharge. Please refer to the AM-PAC score for current functional status. Past Medical History:  has a past medical history of Alkaline phosphatase elevation, Arthritis, Brain aneurysm, Bronchitis, Dyspepsia, Exercise counseling, Frequent urination, Headache(784.0), Hypertension, Leukocytosis, Major depressive disorder, Obesity, Stroke ([facility] [address] Thyroid disease, and Viral upper respiratory tract infection. Past Surgical History:   has a past surgical history that includes brain surgery; Tubal ligation; Hysterectomy; ovarian cyst removal; cyst removal; Appendectomy; Colonoscopy; and Cardiac surgery. Restrictions  Restrictions/Precautions: General Precautions; Fall Risk;Up as Tolerated  Other position/activity restrictions: Up as tolerated, telemetry, [email or web address], ALARMS  Required Braces or Orthoses?: No    Subjective  Subjective: Pt in bed upon arrival and agreeable to therapy. Restrictions/Precautions: General Precautions; Fall Risk;Up as Tolerated             Objective     Cognition  Overall Cognitive Status: Exceptions  Arousal/Alertness: Appropriate responses to stimuli  Following Commands:  Follows multistep commands consistently  Attention Span: Appears intact  Memory: Appears intact  Safety Judgement: Decreased awareness of need for safety  Problem Solving: Assistance required to correct errors made  Insights: Decreased awareness of deficits  Initiation: Requires cues for some  Sequencing: Requires cues for some  Orientation  Overall Orientation Status: Within Functional Limits Perception  Overall Perceptual Status: WFL     ADL  Upper Extremity Bathing  Assistance Level: Set-up; Supervision  Skilled Clinical Factors: seated  Lower Extremity Bathing  Assistance Level: Set-up; Stand by assist  Skilled Clinical Factors: standing to bathe padmini area, pt able to bathe legs and feet seated  Upper Extremity Dressing  Assistance Level: Set-up; Minimal assistance  Skilled Clinical Factors: to change gowns  Lower Extremity Dressing  Assistance Level: Set-up; Supervision  Putting On/Taking Off Footwear  Assistance Level: Set-up; Independent  Skilled Clinical Factors: seated  Toileting  Assistance Level: Supervision  Toilet Transfers  Technique: Stand step  Equipment: Beside commode  Assistance Level: Supervision          Functional Mobility  Device: Cane  Activity:  (mobility in room)  Assistance Level: Stand by assist  Skilled Clinical Factors: Min cues for safety and line mgmt  Bed Mobility  Overall Assistance Level: Modified Independent  Supine to Sit  Assistance Level: Modified independent  Sit to Stand  Assistance Level: Supervision  Stand to Sit  Assistance Level: Supervision   Neuromuscular Education  Neuromuscular education: Yes  NDT Treatment: Gait ;Sitting;Standing     Assessment  Assessment  Assessment: Pt progressing towards goals but is limited by resp status. Pt completed part of ADL session on room air and pt's SPO2 dropped to 73-74%. Pt put back on 2L O2 and was instructed on pursed lip breathing technique and pt recovered fairly quickly to 91-92%. Pt is SUP/SBA for all functional tasks and uses personal cane. Skilled OT indicated to increase safety and IND with all functional tasks to ensure a safe return to PLOF. Activity Tolerance: Patient tolerated treatment well; Other  (resp status)  Discharge Recommendations: Patient would benefit from continued therapy after discharge  Safety Devices  Safety Devices in place: Yes  Type of devices: All fall risk precautions in place; Left in chair;Call light within reach;Nurse notified; Chair alarm in place;Gait belt;Patient at risk for falls    Patient Education  Education  Education Provided: Mobility Training;Transfer Training;Energy Conservation; Safety  Education Method: Verbal  Barriers to Learning: None  Education Outcome: Verbalized understanding;Demonstrated understanding    Plan  Plan  Times per Week: 4-5x per week, 1-2x per day as cara  Current Treatment Recommendations: Strengthening;Balance training;Functional mobility training; Endurance training;Neuromuscular re-education; Safety education & training;Patient/Caregiver education & training;Equipment evaluation, education, & procurement;Self-Care / ADL;Positioning;Cognitive/Perceptual training    Goals  Patient Goals   Patient goals : to go home  Short Term Goals  Time Frame for Short term goals: by discharge, pt to demo  Short Term Goal 1: I/MI for bed mob tasks without bed rails  Short Term Goal 2: SBA for ADL transfers and functional mob with AD and Good safety  Short Term Goal 3: SBA for total body ADLs with AE as needed and Good safety  Short Term Goal 4: increase BUE strength by 1/2 grade to inc IND with self-care and be IND with HEP  Short Term Goal 5: pt/family to be IND with EC/WS, fall prevention tech, pressure relief education, discharge recommendations, with use of handouts as needed    AM-PAC Score        AM-PAC Inpatient Daily Activity Raw Score: 21 (09/23/22 1330)  AM-PAC Inpatient ADL T-Scale Score : 44.27 (09/23/22 1330)  ADL Inpatient CMS 0-100% Score: 32.79 (09/23/22 1330)  ADL Inpatient CMS G-Code Modifier : CJ (09/23/22 1330)      Therapy Time   Individual Concurrent Group Co-treatment   Time In 0958         Time Out 1036         Minutes [address] [address]

## 2022-09-23 NOTE — CARE COORDINATION
DC Planning    Spoke with pt at bedside. PT/OT recs c, pt agrees. Referral sent to [facility] spoke with Zuly-pt has been approved. BILL is signed. Home care agency will pull data electronically. Pt does need a walker-Perfect serve to attending. Order for walker, F2F, face sheet Faxed to [address] [place] [address]. Spoke with Samantha Young. Pt does wear home 02 24/7 mostly 2l and has portability. Called [place], TATIANA, PHM, AHM none have her on service for 02.      Her son can pick her up after 6pm.

## 2022-09-23 NOTE — PLAN OF CARE
Problem: Respiratory - Adult  Goal: Achieves optimal ventilation and oxygenation  9/23/2022 0949 by Nolan Albright RCP  Outcome: Progressing     Problem: Respiratory - Adult  Goal: Adequate oxygenation  Outcome: Progressing     Problem: Respiratory - Adult  Goal: Able to breathe comfortably  Outcome: Progressing

## 2022-09-23 NOTE — PLAN OF CARE
Problem: Discharge Planning  Goal: Discharge to home or other facility with appropriate resources  Outcome: Progressing  Note: Discharge teaching and instructions for diagnosis/procedure explained with patient using teachback method. Patient voiced understanding regarding prescriptions, follow up appointments, and care of self at home. Problem: Pain  Goal: Verbalizes/displays adequate comfort level or baseline comfort level  Outcome: Progressing  Note: Monitoring pain with each assessment and prn. CHANDRIKA 0-10 pain scale utilized. Non-pharmacological measures to be encouraged prior to pharmacological measures. Problem: Safety - Adult  Goal: Free from fall injury  Outcome: Progressing  Note: Pt fall risk, fall band present, falling star, safety alarm activated and in use as needed. Hourly rounding performed. Pt encouraged to use call light. See Leonarda Real fall risk assessment. Problem: ABCDS Injury Assessment  Goal: Absence of physical injury  Outcome: Progressing  Note: Non-skid socks in place, up with assistance, bed in lowest position, bed exit & alarm as needed, provide toileting every 2 hours an d as needed. Problem: Chronic Conditions and Co-morbidities  Goal: Patient's chronic conditions and co-morbidity symptoms are monitored and maintained or improved  Outcome: Progressing     Problem: Respiratory - Adult  Goal: Achieves optimal ventilation and oxygenation  Outcome: Progressing  Note: Assess breath sounds every shift and as needed. Assess oxygenation level & respiration rate. Encourage coughing & deep breathing. Encourage use of incentive spirometer. Assess cough & sputum. Administer oxygen as needed. Problem: Skin/Tissue Integrity - Adult  Goal: Skin integrity remains intact  Outcome: Progressing  Note: Continuing to monitor for skin integrity risks. Patient independent with turning/repositioning. Turning/repositioning encouraged at least once every 2 hrs, and prn basis.  Hygiene care being completed independently per patient; assistance provided when deemed necessary. Problem: Nutrition Deficit:  Goal: Optimize nutritional status  Outcome: Progressing  Note: Review diet daily and as needed with pt. Provide supplement nutrition as ordered by physician & educate pt. Review nutritional guidelines with pt.

## 2022-09-23 NOTE — PLAN OF CARE
Peggy Nowak was evaluated today and a DME order was entered for a wheeled walker because she requires this to successfully complete daily living tasks of eating, toileting, personal cares, and ambulating. A wheeled walker is necessary due to the patient's unsteady gait, upper body weakness, and inability to  an ambulation device; and she can ambulate only by pushing a walker instead of a lesser assistive device such as a cane, crutch, or standard walker. The need for this equipment was discussed with the patient and she understands and is in agreement.

## 2022-09-23 NOTE — PROGRESS NOTES
IV and telemetry removed, discharge teaching/instructions given to patient using AVS. Printed prescriptions given for chest x-ray. Patient voiced understanding regarding prescriptions/medications, follow up appointments, and care of self at home.  Pt states grandson will be in to transport her home about 1800

## 2022-09-23 NOTE — DISCHARGE SUMMARY
[facility]  Office: [address], DO, Nevin Salinas, DO, Stacey Jordan, DO, Reta Richardson Blood, DO, Paul Potter MD, Jean Claude Chritsine MD, Ai Patel MD, Deangelo Caldera MD,  Quan Gatica MD, Mira Guzmán MD, Aye Weeks, DO, Annika Moctezuma MD,  Jhonny Parsons MD, Ching Meehan MD, Sudha Cheek, DO, Eileen Rutledge MD, Kristin Hoang MD, Myles Colmenares MD, Altagracia Recinos MD, Antelmo Mancuso MD, Jacky Orellana MD, Emily Garcia, DO, Diya Sagastume MD, Meron Hansen MD, Dylan Lee CNP,  Miko Lucas, CNP, Nohemi Page, CNP, Jose Coppola, CNP,  Yuli Raphael, DNP, Sakshi Stone, CNP, Sloane Wilson, CNP, Maria Luisa Marie, CNP, Omkar Avila, CNP, Mor Shen, CNP, Pushpa Mart PADeannC, Oksana Acevedo, CNS, Chayo Curtis, DNP, Sixto Rosas, CNP, Janelle Uribe, CNP, Nickolas Ruff, [facility]    Discharge Summary     Patient ID: Maribel Bates  :  1950   MRN: [id number]     ACCOUNT:  [de-identified]   Patient's PCP: Lila Martinez MD  Admit Date: 2022   Discharge Date: 2022     Length of Stay: 2  Code Status:  Full Code  Admitting Physician: Annika Moctezuma MD  Discharge Physician: Annika Moctezuma MD     Active Discharge Diagnoses:     Hospital Problem Lists:  Principal Problem:    Community acquired pneumonia of left lower lobe of lung  Active Problems:    BMI 40.0-44.9, adult [facility])    Essential hypertension    Hypothyroidism    Major depressive disorder, recurrent episode, mild (HCC)    Nicotine dependence, cigarettes, uncomplicated    Prediabetes  Resolved Problems:    * No resolved hospital problems.  *      Admission Condition:  stable     Discharged Condition: stable    Hospital Stay:     Hospital Course:  Maribel Bates is a 67 y.o. female who was admitted for the management of  Community acquired pneumonia of left lower lobe of lung , presented to ER with Chest Pain (Onset last Fri)    This is a very pleasant 67year old female with past medical history of obesity, hypothyroidisim, HTN, Depression, CKD stage II presents with cough and shortenss of breath found to have PNA. Patient being treated with azithromycin and rocephin transitioned to PO augmentin. Significant therapeutic interventions: N/A    Significant Diagnostic Studies:   Labs / Micro:  CBC:   Lab Results   Component Value Date/Time    WBC 12.6 09/23/2022 05:03 AM    RBC 4.55 09/23/2022 05:03 AM    HGB 11.3 09/23/2022 05:03 AM    HCT 38.5 09/23/2022 05:03 AM    MCV 84.6 09/23/2022 05:03 AM    MCH 24.8 09/23/2022 05:03 AM    MCHC 29.4 09/23/2022 05:03 AM    RDW 15.7 09/23/2022 05:03 AM     09/23/2022 05:03 AM     BMP:    Lab Results   Component Value Date/Time    GLUCOSE 95 09/23/2022 05:03 AM     09/23/2022 05:03 AM    K 4.3 09/23/2022 05:03 AM     09/23/2022 05:03 AM    CO2 31 09/23/2022 05:03 AM    ANIONGAP 7 09/23/2022 05:03 AM    BUN 13 09/23/2022 05:03 AM    CREATININE 1.00 09/23/2022 05:03 AM    BUNCRER 13 09/23/2022 05:03 AM    CALCIUM 9.1 09/23/2022 05:03 AM    LABGLOM 54 09/23/2022 05:03 AM    GFRAA >60 09/23/2022 05:03 AM    GFR      09/23/2022 05:03 AM        Radiology:  XR CHEST PORTABLE    Result Date: 9/21/2022  Interstitial prominence and bilateral pulmonary opacities concerning for edema or pneumonia. Bilateral pleural effusions. JORDAN HERSON DIGITAL SCREEN BILATERAL    Result Date: 9/21/2022  No mammographic findings of malignancy. BI-RADS 1 BIRADS - CATEGORY 1 Negative, no evidence of malignancy. Normal interval follow-up is recommended in 12 months. OVERALL ASSESSMENT - NEGATIVE A letter of notification will be sent to the patient regarding the results. The [employer] of Radiology recommends annual mammograms for women 40 years and older.     CT Lung Screening    Result Date: 9/21/2022  Interval development moderate-sized loculated left pleural effusion with LLL consolidation, most likely on the basis and infectious or inflammatory process. Underlying LLL pathology, however, not excluded. No suspicious nodule seen aerated lung. Following thoracentesis, depending upon results, follow-up CT chest in 3-6 months recommended (see below). LUNG RADS: Per ACR Lung-RADS Version 1.1 Category 3, Probably benign (Probably benign finding(s) - short term follow up suggested; includes nodules with a low likelihood of becoming a clinically active cancer). Management: 6 Month LDCT. RECOMMENDATIONS: If you would like to register your patient with the [facility] Nodule/Lung Cancer Screening Program, please contact the Nurse Navigator at [phone]). Consultations:    Consults:     Final Specialist Recommendations/Findings:   IP CONSULT TO INTERNAL MEDICINE  IP CONSULT TO DIETITIAN      The patient was seen and examined on day of discharge and this discharge summary is in conjunction with any daily progress note from day of discharge. Discharge plan:     Disposition: Home    Physician Follow Up:     Rana Closs, MD  [address] [facility] [address] [place] [address] [phone]  Follow up  Home care agency       Requiring Further Evaluation/Follow Up POST HOSPITALIZATION/Incidental Findings: follow up PCP    Diet: cardiac diet    Activity: As tolerated    Instructions to Patient: please take your midcations as prescribed.  Follow up PCP     Discharge Medications:      Medication List        START taking these medications      amoxicillin-clavulanate 875-125 MG per tablet  Commonly known as: AUGMENTIN  Take 1 tablet by mouth 2 times daily for 5 days     Probiotic Acidophilus Tabs  Take 1 tablet by mouth daily for 10 days            CONTINUE taking these medications      aspirin 81 MG tablet     Breo Ellipta 200-25 MCG/INH Aepb inhaler  Generic drug: Fluticasone furoate-vilanterol     dorzolamide-timolol 22.3-6.8 MG/ML ophthalmic solution  Commonly known as: COSOPT     ibuprofen 200 MG tablet  Commonly known as: ADVIL;MOTRIN     levothyroxine 75 MCG tablet  Commonly known as: SYNTHROID  Take 1 tablet by mouth Daily     liothyronine 5 MCG tablet  Commonly known as: CYTOMEL  Take 2 tablets by mouth daily     losartan-hydroCHLOROthiazide 50-12.5 MG per tablet  Commonly known as: HYZAAR  Take 1 tablet by mouth daily     metFORMIN 500 MG tablet  Commonly known as: GLUCOPHAGE  Take 1 tablet by mouth 2 times daily (with meals)     venlafaxine 150 MG extended release capsule  Commonly known as: EFFEXOR XR  TAKE ONE CAPSULE BY MOUTH DAILY     Vitamin B-12 5000 MCG Tbdp     vitamin D 50 MCG (2000 UT) Caps capsule               Where to Get Your Medications        These medications were sent to [facility],  [address] [place] [address] [phone]  [address] [address]      Phone: [phone]   amoxicillin-clavulanate 875-125 MG per tablet  Probiotic Acidophilus Tabs         Discharge Procedure Orders   XR CHEST STANDARD (2 VW)   Standing Status: Future Standing Exp. Date: 09/23/23       Time Spent on discharge is  31 mins in patient examination, evaluation, counseling as well as medication reconciliation, prescriptions for required medications, discharge plan and follow up. Electronically signed by   Myriam Nugent MD  9/23/2022  6:06 PM      Thank you Dr. Carolee Hernandez MD for the opportunity to be involved in this patient's care.

## 2022-09-23 NOTE — DISCHARGE INSTRUCTIONS
Pneumonia: Care Instructions  Overview     Pneumonia is an infection of the lungs. Most cases are caused by infections from bacteria or viruses. Pneumonia may be mild or very severe. If it is caused by bacteria, you will be treated with antibiotics. It may take a few weeks to a few months to recover fully from pneumonia, depending on how sick you were and whether your overall health is good. Follow-up care is a key part of your treatment and safety. Be sure to make and go to all appointments, and call your doctor if you are having problems. It's also a good idea to know your test results and keep a list of the medicines you take. How can you care for yourself at home? Take your antibiotics exactly as directed. Do not stop taking the medicine just because you are feeling better. You need to take the full course of antibiotics. Take your medicines exactly as prescribed. Call your doctor if you think you are having a problem with your medicine. Get plenty of rest and sleep. You may feel weak and tired for a while, but your energy level will improve with time. To prevent dehydration, drink plenty of fluids. Choose water and other clear liquids. If you have kidney, heart, or liver disease and have to limit fluids, talk with your doctor before you increase the amount of fluids you drink. Take care of your cough so you can rest. A cough that brings up mucus from your lungs is common with pneumonia. It is one way your body gets rid of the infection. But if coughing keeps you from resting or causes severe fatigue and chest-wall pain, talk to your doctor. Your doctor may suggest that you take a medicine to reduce the cough. Use a vaporizer or humidifier to add moisture to your bedroom. Follow the directions for cleaning the machine. Do not smoke or allow others to smoke around you. Smoke will make your cough last longer. If you need help quitting, talk to your doctor about stop-smoking programs and medicines. These can increase your chances of quitting for good. Take an over-the-counter pain medicine, such as acetaminophen (Tylenol), ibuprofen (Advil, Motrin), or naproxen (Aleve). Read and follow all instructions on the label. Do not take two or more pain medicines at the same time unless the doctor told you to. Many pain medicines have acetaminophen, which is Tylenol. Too much acetaminophen (Tylenol) can be harmful. If you were given a spirometer to measure how well your lungs are working, use it as instructed. This can help your doctor tell how your recovery is going. To prevent pneumonia in the future, talk to your doctor about getting a yearly flu vaccine and a pneumococcal vaccine. When should you call for help? Call 911 anytime you think you may need emergency care. For example, call if:    You have severe trouble breathing. Call your doctor now or seek immediate medical care if:    You cough up dark brown or bloody mucus (sputum). You have new or worse trouble breathing. You are dizzy or lightheaded, or you feel like you may faint. Watch closely for changes in your health, and be sure to contact your doctor if:    You have a new or higher fever. You are coughing more deeply or more often. You are not getting better after 2 days (48 hours). You do not get better as expected. Where can you learn more? Go to https://GreenPocketpeDymant.Databox. org and sign in to your testhub account. Enter D336 in the [facility] box to learn more about \"Pneumonia: Care Instructions. \"     If you do not have an account, please click on the \"Sign Up Now\" link. Current as of: February 9, 2022               Content Version: 13.4  © 5822-2108 [employer]. Care instructions adapted under license by [facility] ([facility]).  If you have questions about a medical condition or this instruction, always ask your healthcare professional. Logan Linares disclaims any warranty or liability for your use of this information.

## 2022-09-23 NOTE — PROGRESS NOTES
Physical Therapy  Facility/Department: [facility] PROGRESSIVE CARE  Daily Treatment Note  NAME: Martina Koehler  : 1950  MRN: [id number]    Date of Service: 2022    Discharge Recommendations:  Patient would benefit from continued therapy after discharge    Pt currently functioning below baseline. Recommend daily inpatient skilled therapy at time of discharge to maximize long term outcomes and prevent re-admission. Please refer to AM-PAC score for current level of function. Patient Diagnosis(es): The primary encounter diagnosis was Pneumonia of left lower lobe due to infectious organism. A diagnosis of Hypoxia was also pertinent to this visit. Assessment   Assessment: Patient is progressing towrard STGs for returning to [facility]. Discussed safety with SPC, progressing endurance, HEP to maintain strength and wearing O2 outside of the home as her SpO2 dropped to 85% on 2 L during mobility. Activity Tolerance: Patient tolerated evaluation without incident;Patient limited by endurance   AM-PAC Score:  Plan    Plan  Plan: 5-7 times per week  Current Treatment Recommendations: Strengthening;Balance training;Functional mobility training;Transfer training; Endurance training;Gait training;Home exercise program;Safety education & training;Patient/Caregiver education & training     Restrictions  Restrictions/Precautions  Restrictions/Precautions: General Precautions, Fall Risk, Up as Tolerated  Required Braces or Orthoses?: No  Position Activity Restriction  Other position/activity restrictions: Up as tolerated, telemetry, cont pulse ox, [email or web address], ALARMS     Subjective    Subjective  Subjective: Patient agreeable for PT treatment and is up in chair upon writer's arrival.  Orientation  Overall Orientation Status: Within Functional Limits  Cognition  Overall Cognitive Status: Exceptions  Arousal/Alertness: Appropriate responses to stimuli  Following Commands: Follows one step commands with repetition; Follows one step commands with increased time  Attention Span: Attends with cues to redirect  Memory: Appears intact  Safety Judgement: Decreased awareness of need for assistance;Decreased awareness of need for safety  Problem Solving: Decreased awareness of errors;Assistance required to identify errors made;Assistance required to correct errors made;Assistance required to implement solutions  Insights: Decreased awareness of deficits  Initiation: Requires cues for some  Sequencing: Does not require cues     Objective   Bed Mobility Training  Bed Mobility Training: No  Interventions: Verbal cues; Tactile cues  Sit to Supine: Stand-by assistance  Scooting: Stand-by assistance  Transfer Training  Transfer Training: Yes  Overall Level of Assistance: Contact-guard assistance  Interventions: Verbal cues; Safety awareness training; Tactile cues (VC pursed lip breathing and self pacing.)  Sit to Stand: Contact-guard assistance  Stand to Sit: Contact-guard assistance  Gait Training  Gait Training: Yes  Gait  Overall Level of Assistance: Contact-guard assistance  Interventions: Verbal cues; Tactile cues; Safety awareness training  Base of Support: Widened  Speed/Fabby: Shuffled  Gait Abnormalities: Shuffling gait  Distance (ft):  (50' x 2)  Assistive Device: Cane, straight;Gait belt  Comment: Toward the endo f ambulation patient lateral sway slightly d.t fatigue. Writer assist with portable O2 tank, patient's SpO2 post ambulation ws 85% on 2L 2min recovery to 90s. PT Exercises  Exercise Treatment: 5 STS SBA without cane to promote core stability and increased endurance, fair tolerance patient SOB and fatigued SpO2 90%. Created, issued and reviewed seated, supine and standing HEP with patient. Patient performed seated carmen LE AROM x 15 reps to promoted joint congruency and strength. Standing carmen LE AROM x 10 reps with bed rail for Lt UE support to increased stability. Good tolerance standing rest breaks needed between each exercises.  1 seated rest break needed. Access Code: [email or web address]: [employer]/Prepared by: Cong English  Exercises   Seated Scapular Retraction - 1 x daily - 7 x weekly - 10 reps - 3 sets   Seated Ankle Pumps - 1 x daily - 7 x weekly - 10 reps - 3 sets   Seated Gluteal Sets - 1 x daily - 7 x weekly - 10 reps - 3 sets   Seated Long Arc Quad - 1 x daily - 7 x weekly - 10 reps - 3 sets   Seated March - 1 x daily - 7 x weekly - 10 reps - 3 sets   Seated Hip Abduction - 1 x daily - 7 x weekly - 10 reps - 3 sets   Seated Pursed Lip Breathing - 1 x daily - 7 x weekly - 10 reps - 3 sets   Access Code: [email or web address]: [employer]/  Prepared by: Cong English  Exercises   Supine Ankle Pumps - 1 x daily - 7 x weekly - 10 reps - 3 sets   Supine Quad Set - 1 x daily - 7 x weekly - 10 reps - 3 sets   Supine Heel Slide - 1 x daily - 7 x weekly - 10 reps - 3 sets   Supine Active Straight Leg Raise - 1 x daily - 7 x weekly - 10 reps - 3 sets   Supine Hip Abduction AROM - 1 x daily - 7 x weekly - 10 reps - 3 sets   Supine Bridge - 1 x daily - 7 x weekly - 10 reps - 3 sets   Supine Gluteal Sets - 1 x daily - 7 x weekly - 10 reps - 3 sets   Access Code: [email or web address]: [employer]/   Prepared by: Cong English  Exercises   Standing Marching - 1 x daily - 7 x weekly - 10 reps - 3 sets   Standing 3-Way Kick - 1 x daily - 7 x weekly - 10 reps - 3 sets   Sit to Stand - 1 x daily - 7 x weekly - 10 reps - 3 sets   Standing Hamstring Stretch on Chair - 1 x daily - 7 x weekly - 10 reps - 3 sets   Squat with Chair Support - 1 x daily - 7 x weekly - 10 reps - 3 sets   Standing Heel Raise with Support - 1 x daily - 7 x weekly - 10 reps - 3 sets   Standing Gastroc Stretch - 1 x daily - 7 x weekly - 10 reps - 3 sets      Safety Devices  Type of Devices: Call light within reach; Chair alarm in place;Gait belt; Heels elevated for pressure relief;Patient at risk for falls; Left in chair;Nurse notified  Restraints  Restraints Initially in Place: No       Goals  Short Term Goals  Time Frame for Short term goals: 12 visits  Short term goal 1: Inc bed-mobility & transfers to independent to enable pt to safely get in/OOB & chair to return to PLOF & decrease risk for falls  Short term goal 2: Inc gait to amb 75ft or > indep w/ RW to enable pt to return to previous level of independence & able to demonstrate indep/ safe use of RW in functional activities including approaching surfaces and turning to sit. Short term goal 3: Inc strength to [address] standing balance to good with device to facilitate pt independence for performance of ADL's & functional mobility, & reduce fall risk  Short term goal 4: Pt able to tolerate 30 min of activity to include 15-20 reps of ex, NMR & functional mobility with device to facilitate activity tolerance to [facility]  Short term goal 5: Ed pt on home ex's, safety & energy conservation(planning, pacing, prioritizing & positioning), self regulating breathing techniques, fall prevention, & issue written pt education    Education  Patient Education  Education Given To: Patient  Education Provided: Role of Therapy;Plan of Care;Home Exercise Program;Transfer Training;Energy Conservation; Fall Prevention Strategies  Education Provided Comments: Seated and standing HEP, progressing gait tolerance, pursed lip breathing, endurance training and O2 safety  Education Method: Demonstration  Barriers to Learning: None  Education Outcome: Verbalized understanding;Demonstrated understanding    Therapy Time   Individual Concurrent Group Co-treatment   Time In 1697         Time Out 9000         Minutes [address]

## 2022-09-26 ENCOUNTER — CARE COORDINATION (OUTPATIENT)
Dept: CASE MANAGEMENT | Age: 72
End: 2022-09-26

## 2022-09-26 DIAGNOSIS — J18.9 COMMUNITY ACQUIRED PNEUMONIA OF LEFT LOWER LOBE OF LUNG: Primary | ICD-10-CM

## 2022-09-26 LAB
CULTURE: NORMAL
CULTURE: NORMAL
Lab: NORMAL
Lab: NORMAL
SPECIMEN DESCRIPTION: NORMAL
SPECIMEN DESCRIPTION: NORMAL

## 2022-09-26 PROCEDURE — 1111F DSCHRG MED/CURRENT MED MERGE: CPT | Performed by: FAMILY MEDICINE

## 2022-09-26 NOTE — CARE COORDINATION
Sarah 45 Transitions Initial Follow Up Call    Call within 2 business days of discharge: Yes    Patient: Junior Parekh Patient : 1950   MRN: [id number]  Reason for Admission: Pneumonia of left lower lobe due to infectious organism  Discharge Date: 22 RARS: Readmission Risk Score: 13.1      Last Discharge [facility]       Date Complaint Diagnosis Description Type Department Provider    22 Chest Pain Pneumonia of left lower lobe due to infectious organism . .. ED to Hosp-Admission (Discharged) (ADMITTED) MYLA Peterson MD; Moraima Luque... Spoke with: Brett Lerma she states she is not so good today she was up all night vomiting and having diarrhea, she ate Chili and a chocolate shake for dinner, CTN educated patient on diet choices while taking Augmentin suggested yogurt and bland foods for the time being. She denies chest pain, SOB, dizziness, fever, chills, cough. She is taking in fluid and eating loose stools vomiting and normal bladder elimination. Medications reviewed and taking as directed 1111 f completed, patient declined assistance with scheduling PCP visit number was provided to her to do so. Patient has  no other concerns    Facility: [facility]    Non-face-to-face services provided:  Obtained and reviewed discharge summary and/or continuity of care documents  Education of patient/family/caregiver/guardian to support self-management-diet education staying hydrated   Transitions of Care Initial Call    Was this an external facility discharge? No Discharge Facility: [facility]    Challenges to be reviewed by the provider   Additional needs identified to be addressed with provider: No  none             Method of communication with provider : none    Advance Care Planning:   Does patient have an Advance Directive: not on file and patient declined education. LPN Care Coordinator contacted the patient by telephone to perform post hospital discharge assessment. Verified name and  with patient as identifiers. Provided introduction to self, and explanation of the LPN CC role. LPN CC reviewed discharge instructions, medical action plan and red flags with patient who verbalized understanding. Patient given an opportunity to ask questions and does not have any further questions or concerns at this time. Were discharge instructions available to patient? Yes. Reviewed appropriate site of care based on symptoms and resources available to patient including: PCP  Specialist  Home health. The patient agrees to contact the PCP office for questions related to their healthcare. Medication reconciliation was performed with patient, who verbalizes understanding of administration of home medications. Advised obtaining a 90-day supply of all daily and as-needed medications. Was patient discharged with a pulse oximeter? no    LPN CC provided contact information. Plan for follow-up call in 3-5 days based on severity of symptoms and risk factors.   Plan for next call: symptom management-upset stomach f/u with PCP     Care Transitions 24 Hour Call    Schedule Follow Up Appointment with PCP: Mona Marquez you have a copy of your discharge instructions?: Yes  Do you have all of your prescriptions and are they filled?: Yes  Have you been contacted by a [employer] Pharmacist?: No  Have you scheduled your follow up appointment?: No  Do you feel like you have everything you need to keep you well at home?: Yes  Care Transitions Interventions         Follow Up  Future Appointments   Date Time Provider Sally Vallejo   2022 11:00 AM Barbara Acosta MD [facility]   2022  1:00 PM Ignacia Hager MD afl  consu [address] [place] [address] [facility]       Glen Law LPN

## 2022-09-27 LAB
P E INTERPRETATION, U: NORMAL
PATHOLOGIST: NORMAL
SPECIMEN TYPE: NORMAL
URINE IFX INTERP: NORMAL
URINE IFX SPECIMEN: NORMAL
URINE TOTAL PROTEIN: 14 MG/DL
URINE TOTAL PROTEIN: 14 MG/DL
VITAMIN B6: 8.9 NMOL/L (ref 20–125)
VOLUME: NORMAL ML

## 2022-09-28 LAB — VITAMIN B1 WHOLE BLOOD: 70 NMOL/L (ref 70–180)

## 2022-09-30 ENCOUNTER — CARE COORDINATION (OUTPATIENT)
Dept: CASE MANAGEMENT | Age: 72
End: 2022-09-30

## 2022-09-30 NOTE — CARE COORDINATION
[facility] Care Transitions Follow Up Call    LPN Care Coordinator contacted the patient by telephone to follow up after admission on . Verified name and  with patient as identifiers. Patient: Radha Núñez  Patient : 1950   MRN: [id number]  Reason for Admission: Pneumonia of left lower lobe due to infectious organism  Discharge Date: 22 RARS: Readmission Risk Score: 13.1      Needs to be reviewed by the provider   Additional needs identified to be addressed with provider: No  none             Method of communication with provider: none. CTN spoke briefly to Mol she states she is still feeling the same she could not speak at this time she had [address] from [facility] in home going over things. She stated she woul be fine this weekend and I could call her back net week. Follow Up  Future Appointments   Date Time Provider Sally Vallejo   2022 11:00 AM Tora Lanes Al-Nsour, MD [facility]   2022  1:00 PM Alexandro Mullins MD afl [facility]         LPN Care Coordinator reviewed discharge instructions with patient and discussed any barriers to care and/or understanding of plan of care after discharge. Discussed appropriate site of care based on symptoms and resources available to patient including: PCP  Specialist. The patient agrees to contact the PCP office for questions related to their healthcare. Advance Care Planning:   not on file. Patients top risk factors for readmission: lack of knowledge about disease and medication management  Interventions to address risk factors: Obtained and reviewed discharge summary and/or continuity of care documents      Care Transitions Subsequent and Final Call    Subsequent and Final Calls  Care Transitions Interventions  Other Interventions:             LPN Care Coordinator provided contact information for future needs. Plan for follow-up call in 3-5 days based on severity of symptoms and risk factors.   Plan for next call: symptom management-cough fever     Trudy Ma LPN

## 2022-10-03 ENCOUNTER — CARE COORDINATION (OUTPATIENT)
Dept: CASE MANAGEMENT | Age: 72
End: 2022-10-03

## 2022-10-03 NOTE — CARE COORDINATION
Care Transitions Outreach Attempt    Call within 2 business days of discharge: Yes   Attempted to reach patient for transitions of care follow up. Unable to reach patient. Patient: Priya Ramirez Patient : 1950 MRN: [id number]    Last Discharge  Street       Date Complaint Diagnosis Description Type Department Provider    22 Chest Pain Pneumonia of left lower lobe due to infectious organism . .. ED to Hosp-Admission (Discharged) (ADMITTED) MYLA Paz MD; Rosenda Rodriguez... Called to speak with patient for update with transition of care. Left HIPPA compliant voice message with contact information [phone] for a call  Back with an update.          Noted following upcoming appointments from discharge chart review:   [facility] follow up appointment(s):   Future Appointments   Date Time Provider Sally Vallejo   2022 11:00 AM Mini Chance MD [address] [place] [address]   2022  1:00 PM Hina Davenport MD a[facility]

## 2022-10-04 ENCOUNTER — TELEPHONE (OUTPATIENT)
Dept: INFUSION THERAPY | Facility: MEDICAL CENTER | Age: 72
End: 2022-10-04

## 2022-10-04 NOTE — TELEPHONE ENCOUNTER
Copy of immunofixation-siep, blood result received. Will be scanned to chart; copy to MD bahena for review as well.

## 2022-10-04 NOTE — TELEPHONE ENCOUNTER
Returned call to Pola fu NP with [facility] Allergy/Immunology ([phone]) regarding patient labs completed 9/28/22. Pola fu states that labs indicate monoclonal gammopathy. She is aware patient has upcoming appointment with oncology but wanted us notified should she need a sooner appointment. Writer has requested that labs be faxed to us (she states will need to get patient consent). Has a 10 day follow up scheduled  at [facility]. Currently has appointment with Dr. Tonette Felty 11/11/22. Routed to Dr. Tonette Felty to determine if needs seen sooner.

## 2022-10-05 ENCOUNTER — CARE COORDINATION (OUTPATIENT)
Dept: CASE MANAGEMENT | Age: 72
End: 2022-10-05

## 2022-10-31 ENCOUNTER — HOSPITAL ENCOUNTER (OUTPATIENT)
Age: 72
Setting detail: SPECIMEN
Discharge: HOME | End: 2022-10-31
Payer: MEDICARE

## 2022-10-31 DIAGNOSIS — D47.2 MGUS (MONOCLONAL GAMMOPATHY OF UNKNOWN SIGNIFICANCE): ICD-10-CM

## 2022-10-31 DIAGNOSIS — E53.8 LOW SERUM VITAMIN B12: ICD-10-CM

## 2022-10-31 DIAGNOSIS — D72.823 LEUKEMOID REACTION: ICD-10-CM

## 2022-10-31 LAB
ABSOLUTE EOS #: 0.39 K/UL (ref 0–0.44)
ABSOLUTE IMMATURE GRANULOCYTE: 0.05 K/UL (ref 0–0.3)
ABSOLUTE LYMPH #: 3.04 K/UL (ref 1.1–3.7)
ABSOLUTE MONO #: 1.05 K/UL (ref 0.1–1.2)
ALBUMIN SERPL-MCNC: 3.5 G/DL (ref 3.5–5.2)
ALBUMIN/GLOBULIN RATIO: 0.7 (ref 1–2.5)
ALP BLD-CCNC: 92 U/L (ref 35–104)
ALT SERPL-CCNC: 7 U/L (ref 5–33)
ANION GAP SERPL CALCULATED.3IONS-SCNC: 10 MMOL/L (ref 9–17)
AST SERPL-CCNC: 12 U/L
BASOPHILS # BLD: 1 % (ref 0–2)
BASOPHILS ABSOLUTE: 0.09 K/UL (ref 0–0.2)
BILIRUB SERPL-MCNC: 0.3 MG/DL (ref 0.3–1.2)
BUN BLDV-MCNC: 7 MG/DL (ref 8–23)
CALCIUM SERPL-MCNC: 9.4 MG/DL (ref 8.6–10.4)
CHLORIDE BLD-SCNC: 99 MMOL/L (ref 98–107)
CO2: 30 MMOL/L (ref 20–31)
CREAT SERPL-MCNC: 1.05 MG/DL (ref 0.5–0.9)
EOSINOPHILS RELATIVE PERCENT: 3 % (ref 1–4)
FREE KAPPA/LAMBDA RATIO: 1.08 (ref 0.26–1.65)
GFR SERPL CREATININE-BSD FRML MDRD: 56 ML/MIN/1.73M2
GLUCOSE BLD-MCNC: 96 MG/DL (ref 70–99)
HCT VFR BLD CALC: 46.3 % (ref 36.3–47.1)
HEMOGLOBIN: 13.2 G/DL (ref 11.9–15.1)
IMMATURE GRANULOCYTES: 0 %
KAPPA FREE LIGHT CHAINS QNT: 5.37 MG/DL (ref 0.37–1.94)
LAMBDA FREE LIGHT CHAINS QNT: 4.95 MG/DL (ref 0.57–2.63)
LYMPHOCYTES # BLD: 24 % (ref 24–43)
MCH RBC QN AUTO: 24.9 PG (ref 25.2–33.5)
MCHC RBC AUTO-ENTMCNC: 28.5 G/DL (ref 28.4–34.8)
MCV RBC AUTO: 87.2 FL (ref 82.6–102.9)
MONOCYTES # BLD: 8 % (ref 3–12)
NRBC AUTOMATED: 0 PER 100 WBC
PDW BLD-RTO: 16.7 % (ref 11.8–14.4)
PLATELET # BLD: 465 K/UL (ref 138–453)
PMV BLD AUTO: 10.1 FL (ref 8.1–13.5)
POTASSIUM SERPL-SCNC: 3.9 MMOL/L (ref 3.7–5.3)
RBC # BLD: 5.31 M/UL (ref 3.95–5.11)
RBC # BLD: ABNORMAL 10*6/UL
SEG NEUTROPHILS: 64 % (ref 36–65)
SEGMENTED NEUTROPHILS ABSOLUTE COUNT: 8.16 K/UL (ref 1.5–8.1)
SODIUM BLD-SCNC: 139 MMOL/L (ref 135–144)
TOTAL PROTEIN: 8.5 G/DL (ref 6.4–8.3)
WBC # BLD: 12.8 K/UL (ref 3.5–11.3)

## 2022-10-31 PROCEDURE — 36415 COLL VENOUS BLD VENIPUNCTURE: CPT

## 2022-10-31 PROCEDURE — 80053 COMPREHEN METABOLIC PANEL: CPT

## 2022-10-31 PROCEDURE — 85025 COMPLETE CBC W/AUTO DIFF WBC: CPT

## 2022-10-31 PROCEDURE — 83521 IG LIGHT CHAINS FREE EACH: CPT

## 2022-11-11 ENCOUNTER — HOSPITAL ENCOUNTER (OUTPATIENT)
Dept: GENERAL RADIOLOGY | Age: 72
Discharge: HOME | End: 2022-11-13
Payer: MEDICARE

## 2022-11-11 ENCOUNTER — HOSPITAL ENCOUNTER (OUTPATIENT)
Age: 72
Discharge: HOME | End: 2022-11-13
Payer: MEDICARE

## 2022-11-11 ENCOUNTER — OFFICE VISIT (OUTPATIENT)
Dept: ONCOLOGY | Age: 72
End: 2022-11-11
Payer: MEDICARE

## 2022-11-11 ENCOUNTER — TELEPHONE (OUTPATIENT)
Dept: ONCOLOGY | Age: 72
End: 2022-11-11

## 2022-11-11 VITALS
DIASTOLIC BLOOD PRESSURE: 79 MMHG | HEART RATE: 67 BPM | WEIGHT: 278.7 LBS | RESPIRATION RATE: 16 BRPM | BODY MASS INDEX: 43.65 KG/M2 | TEMPERATURE: 97.7 F | SYSTOLIC BLOOD PRESSURE: 135 MMHG

## 2022-11-11 DIAGNOSIS — I50.22 CHRONIC SYSTOLIC (CONGESTIVE) HEART FAILURE (HCC): ICD-10-CM

## 2022-11-11 DIAGNOSIS — D47.2 MONOCLONAL GAMMOPATHY: Primary | ICD-10-CM

## 2022-11-11 DIAGNOSIS — J18.9 PNEUMONIA OF LEFT LOWER LOBE DUE TO INFECTIOUS ORGANISM: ICD-10-CM

## 2022-11-11 PROCEDURE — 3017F COLORECTAL CA SCREEN DOC REV: CPT | Performed by: INTERNAL MEDICINE

## 2022-11-11 PROCEDURE — 1123F ACP DISCUSS/DSCN MKR DOCD: CPT | Performed by: INTERNAL MEDICINE

## 2022-11-11 PROCEDURE — G8427 DOCREV CUR MEDS BY ELIG CLIN: HCPCS | Performed by: INTERNAL MEDICINE

## 2022-11-11 PROCEDURE — 99211 OFF/OP EST MAY X REQ PHY/QHP: CPT

## 2022-11-11 PROCEDURE — 71046 X-RAY EXAM CHEST 2 VIEWS: CPT

## 2022-11-11 PROCEDURE — G8484 FLU IMMUNIZE NO ADMIN: HCPCS | Performed by: INTERNAL MEDICINE

## 2022-11-11 PROCEDURE — 3078F DIAST BP <80 MM HG: CPT | Performed by: INTERNAL MEDICINE

## 2022-11-11 PROCEDURE — 4004F PT TOBACCO SCREEN RCVD TLK: CPT | Performed by: INTERNAL MEDICINE

## 2022-11-11 PROCEDURE — 99214 OFFICE O/P EST MOD 30 MIN: CPT | Performed by: INTERNAL MEDICINE

## 2022-11-11 PROCEDURE — G8399 PT W/DXA RESULTS DOCUMENT: HCPCS | Performed by: INTERNAL MEDICINE

## 2022-11-11 PROCEDURE — 1090F PRES/ABSN URINE INCON ASSESS: CPT | Performed by: INTERNAL MEDICINE

## 2022-11-11 PROCEDURE — G8417 CALC BMI ABV UP PARAM F/U: HCPCS | Performed by: INTERNAL MEDICINE

## 2022-11-11 PROCEDURE — 3074F SYST BP LT 130 MM HG: CPT | Performed by: INTERNAL MEDICINE

## 2022-11-11 NOTE — PROGRESS NOTES
DIAGNOSIS:   Leukocytosis, thrombocytosis, likely leukemoid reaction  MGUS  Low B12    CURRENT THERAPY:  Monthly B12  Observation for MGUS     BRIEF CASE HISTORY:   Brad Martin is a very pleasant 67 y.o. female who is referred to us for leukocytosis. She has history of spinal damage and back pain, she has had several procedures. She has history of hypertension and hypothyroidism. Her sister and aunt had breast, sister had leukemia. The patient has history of cysts but no malignancy, her last mammogram was 12/2020 with negative results. She smokes a pack every 3 days. Cancer screening, CT of the chest was done in 2019 and was essentially negative. Mammogram December/2020 that was negative. Last colonoscopy was also in 2019 and showed multiple adenomatous polyps but no cancer. Work-up for the patient showed negative JAK2 mutation and negative BCR ABL, she had small MGUS under 1 g. She had normal kidney function and normal hemoglobin and no hypercalcemia. We decided to observe. INTERIM HISTORY: The patient presents for follow up today to review work up with MGUS surveillance. She has had recent hypertension and her medication was doubled. She is taking oral B12 as directed. Her breathing is stable. She has no new complaints or concerns. She had recent increase in back pain radiating to left hip affecting her ability to walk for a few days but has returned to baseline, she does follow with pain management and is considering surgery. PAST MEDICAL HISTORY: has a past medical history of Alkaline phosphatase elevation, Arthritis, Brain aneurysm, Bronchitis, Dyspepsia, Exercise counseling, Frequent urination, Headache(784.0), Hypertension, Leukocytosis, Major depressive disorder, Obesity, Stroke ([address] Thyroid disease, and Viral upper respiratory tract infection. PAST SURGICAL HISTORY: has a past surgical history that includes brain surgery; Tubal ligation;  Hysterectomy; ovarian cyst removal; cyst removal; Appendectomy; Colonoscopy; and Cardiac surgery. CURRENT MEDICATIONS:  has a current medication list which includes the following prescription(s): vitamin b-12, losartan-hydrochlorothiazide, levothyroxine, venlafaxine, liothyronine, breo ellipta, metformin, ibuprofen, dorzolamide-timolol, vitamin d, and aspirin. ALLERGIES:  is allergic to aspirin, baclofen, and wellbutrin [bupropion]. FAMILY HISTORY: Sister and aunt: breast cancer; sister: leukemia     SOCIAL HISTORY:  reports that she has been smoking cigarettes. She has a 25.00 pack-year smoking history. She has never used smokeless tobacco. She reports that she does not drink alcohol and does not use drugs. REVIEW OF SYSTEMS:   General: No fever or night sweats. Weight is stable. ENT: No double or blurred vision, no tinnitus or hearing problem, no dysphagia or sore throat   Respiratory: No chest pain, no shortness of breath, no cough or hemoptysis. Cardiovascular: Denies chest pain, PND or orthopnea. No L E swelling or palpitations. Gastrointestinal: No nausea or vomiting, abdominal pain, diarrhea or constipation. Genitourinary: Denies dysuria, hematuria, frequency, urgency or incontinence. Neurological: Denies headaches, decreased LOC, no sensory or motor focal deficits. +pain around brain shunt  Musculoskeletal: No arthralgia or joint swelling. +chronic back pain  Skin: There are no rashes or bleeding. Psychiatric: No anxiety, no depression. Endocrine: No diabetes or thyroid disease. Hematologic: No bleeding, no adenopathy. PHYSICAL EXAM: Shows a well appearing 67y.o.-year-old female who is not in pain or distress. Vital Signs: Blood pressure 135/79, pulse 67, temperature 97.7 °F (36.5 °C), temperature source Temporal, resp. rate 16, weight 278 lb 11.2 oz (126.4 kg). HEENT: Normocephalic and atraumatic. Pupils are equal, round, reactive to light and accommodation. Extraocular muscles are intact.  Neck: Showed no JVD, no carotid bruit . Lungs: Clear to auscultation bilaterally. Heart: Regular without any murmur. Abdomen: Soft, nontender. No hepatosplenomegaly. Extremities: Lower extremities show bilateral trace edema improved. No clubbing, or cyanosis. Breasts: Examination not done today. Neuro exam: intact cranial nerves bilaterally no motor or sensory deficit, gait is normal. Lymphatic: no adenopathy appreciated in the supraclavicular, axillary, cervical or inguinal area    REVIEW OF LABORATORY DATA:   Lab Results   Component Value Date    WBC 12.8 (H) 10/31/2022    HGB 13.2 10/31/2022    HCT 46.3 10/31/2022    MCV 87.2 10/31/2022     (H) 10/31/2022       Chemistry        Component Value Date/Time     10/31/2022 1158    K 3.9 10/31/2022 1158    CL 99 10/31/2022 1158    CO2 30 10/31/2022 1158    BUN 7 (L) 10/31/2022 1158    CREATININE 1.05 (H) 10/31/2022 1158        Component Value Date/Time    CALCIUM 9.4 10/31/2022 1158    ALKPHOS 92 10/31/2022 1158    AST 12 10/31/2022 1158    ALT 7 10/31/2022 1158    BILITOT 0.3 10/31/2022 1158        Lab Results   Component Value Date    XDGBTSKJ89 >2000 (H) 09/21/2022         SPEP 04/2022  IgG, Lambda. THE APPROXIMATE DENSITOMETRIC QUANTITATION OF PARAPROTEIN IS 0.96 G/DL  SPEP 09/2022  IgG, Lambda. THE APPROXIMATE DENSITOMETRIC QUANTITATION OF PARAPROTEIN IS 0.81 G/DL. REVIEW OF RADIOLOGICAL RESULTS:     IMPRESSION:   Leukocytosis  HX hypertensin and hypothyroidism   Low B12 - plan for supplementation  Small MGUS without any signs of myeloma  Smoking addiction - no plan to quit    PLAN:   We discussed recent hypertension, she has doubled her medication as directed and is reflected in today's reading. Her lab work was reviewed her SPEP remains stable, her leukocytosis and thrombocytosis are stable, B12 is in range with supplementation, light chains remains elevated but stable. We will continue with surveillance. Her pain is poorly controlled.    We will share notes with immunology. Return in 6 months. Scribe Attestation   This note was created by Arnie Valentine acting as scribe for the physician signing this note  Electronically Signed  Arnie Valentine, 11/11/2022  Scribe, [employer]. Attending Attestation   Note was reviewed and edited.   I am in agreement with the note as entered    Adam Man MD  Hematologist/Medical [id number] [facility] hematology oncology physicians

## 2022-11-11 NOTE — TELEPHONE ENCOUNTER
Cassius Johns MD VISIT  Rv in 6 months, need cbc, cmp spep and light chains, skeletal survey prio rto RV  MD VISIT 5/5/23 @ 11:15AM  XR SKELETAL ORDER GIVEN TO PT ON EXIT  LABS CDP CMP SPEP KAPPA LAMBDA FREE LIGHT CHAINS 4/28/23  ORDERS GIVEN TO PT ON EXIT  AVS PRINTED W/ INSTRUCTIONS AND GIVEN TO PT ON EXIT

## 2023-02-20 ENCOUNTER — TELEPHONE (OUTPATIENT)
Dept: ONCOLOGY | Age: 73
End: 2023-02-20

## 2023-04-24 ENCOUNTER — HOSPITAL ENCOUNTER (OUTPATIENT)
Age: 73
Discharge: HOME | End: 2023-04-26
Payer: MEDICARE

## 2023-04-24 ENCOUNTER — HOSPITAL ENCOUNTER (OUTPATIENT)
Age: 73
Discharge: HOME | End: 2023-04-24
Payer: MEDICARE

## 2023-04-24 ENCOUNTER — HOSPITAL ENCOUNTER (OUTPATIENT)
Dept: GENERAL RADIOLOGY | Age: 73
Discharge: HOME | End: 2023-04-26
Payer: MEDICARE

## 2023-04-24 DIAGNOSIS — D72.823 LEUKEMOID REACTION: ICD-10-CM

## 2023-04-24 DIAGNOSIS — E53.8 LOW SERUM VITAMIN B12: ICD-10-CM

## 2023-04-24 DIAGNOSIS — D47.2 MGUS (MONOCLONAL GAMMOPATHY OF UNKNOWN SIGNIFICANCE): ICD-10-CM

## 2023-04-24 DIAGNOSIS — D47.2 MONOCLONAL GAMMOPATHY: ICD-10-CM

## 2023-04-24 LAB
ABSOLUTE EOS #: 0.46 K/UL (ref 0–0.44)
ABSOLUTE IMMATURE GRANULOCYTE: 0.06 K/UL (ref 0–0.3)
ABSOLUTE LYMPH #: 2.84 K/UL (ref 1.1–3.7)
ABSOLUTE MONO #: 1.23 K/UL (ref 0.1–1.2)
ALBUMIN SERPL-MCNC: 3.7 G/DL (ref 3.5–5.2)
ALBUMIN/GLOBULIN RATIO: 0.8 (ref 1–2.5)
ALP SERPL-CCNC: 110 U/L (ref 35–104)
ALT SERPL-CCNC: 15 U/L (ref 5–33)
ANION GAP SERPL CALCULATED.3IONS-SCNC: 10 MMOL/L (ref 9–17)
AST SERPL-CCNC: 22 U/L
BASOPHILS # BLD: 1 % (ref 0–2)
BASOPHILS ABSOLUTE: 0.09 K/UL (ref 0–0.2)
BILIRUB SERPL-MCNC: 0.3 MG/DL (ref 0.3–1.2)
BUN SERPL-MCNC: 13 MG/DL (ref 8–23)
CALCIUM SERPL-MCNC: 9.7 MG/DL (ref 8.6–10.4)
CHLORIDE SERPL-SCNC: 99 MMOL/L (ref 98–107)
CO2 SERPL-SCNC: 29 MMOL/L (ref 20–31)
CREAT SERPL-MCNC: 1.12 MG/DL (ref 0.5–0.9)
EOSINOPHILS RELATIVE PERCENT: 4 % (ref 1–4)
FREE KAPPA/LAMBDA RATIO: 0.95 (ref 0.26–1.65)
GFR SERPL CREATININE-BSD FRML MDRD: 52 ML/MIN/1.73M2
GLUCOSE SERPL-MCNC: 76 MG/DL (ref 70–99)
HCT VFR BLD AUTO: 46.5 % (ref 36.3–47.1)
HGB BLD-MCNC: 13.6 G/DL (ref 11.9–15.1)
IMMATURE GRANULOCYTES: 1 %
KAPPA LC FREE SER-MCNC: 5 MG/DL (ref 0.37–1.94)
LAMBDA LC FREE SERPL-MCNC: 5.29 MG/DL (ref 0.57–2.63)
LYMPHOCYTES # BLD: 23 % (ref 24–43)
MCH RBC QN AUTO: 24.9 PG (ref 25.2–33.5)
MCHC RBC AUTO-ENTMCNC: 29.2 G/DL (ref 28.4–34.8)
MCV RBC AUTO: 85 FL (ref 82.6–102.9)
MONOCYTES # BLD: 10 % (ref 3–12)
NRBC AUTOMATED: 0 PER 100 WBC
PDW BLD-RTO: 17.2 % (ref 11.8–14.4)
PLATELET # BLD AUTO: 470 K/UL (ref 138–453)
PMV BLD AUTO: 9.5 FL (ref 8.1–13.5)
POTASSIUM SERPL-SCNC: 4.6 MMOL/L (ref 3.7–5.3)
PROT SERPL-MCNC: 8.6 G/DL (ref 6.4–8.3)
RBC # BLD: 5.47 M/UL (ref 3.95–5.11)
RBC # BLD: ABNORMAL 10*6/UL
SEG NEUTROPHILS: 61 % (ref 36–65)
SEGMENTED NEUTROPHILS ABSOLUTE COUNT: 7.49 K/UL (ref 1.5–8.1)
SODIUM SERPL-SCNC: 138 MMOL/L (ref 135–144)
WBC # BLD AUTO: 12.2 K/UL (ref 3.5–11.3)

## 2023-04-24 PROCEDURE — 83521 IG LIGHT CHAINS FREE EACH: CPT

## 2023-04-24 PROCEDURE — 77075 RADEX OSSEOUS SURVEY COMPL: CPT

## 2023-04-24 PROCEDURE — 80053 COMPREHEN METABOLIC PANEL: CPT

## 2023-04-24 PROCEDURE — 85025 COMPLETE CBC W/AUTO DIFF WBC: CPT

## 2023-04-24 PROCEDURE — 84165 PROTEIN E-PHORESIS SERUM: CPT

## 2023-04-24 PROCEDURE — 36415 COLL VENOUS BLD VENIPUNCTURE: CPT

## 2023-04-24 PROCEDURE — 84155 ASSAY OF PROTEIN SERUM: CPT

## 2023-04-26 LAB
ALBUMIN (CALCULATED): 4.3 G/DL (ref 3.2–5.2)
ALBUMIN PERCENT: 51 % (ref 45–65)
ALPHA 1 PERCENT: 3 % (ref 3–6)
ALPHA 2 PERCENT: 11 % (ref 6–13)
ALPHA1 GLOB SERPL ELPH-MCNC: 0.2 G/DL (ref 0.1–0.4)
ALPHA2 GLOB SERPL ELPH-MCNC: 1 G/DL (ref 0.5–0.9)
B-GLOBULIN SERPL ELPH-MCNC: 0.8 G/DL (ref 0.5–1.1)
BETA PERCENT: 10 % (ref 11–19)
GAMMA GLOB SERPL ELPH-MCNC: 2.2 G/DL (ref 0.5–1.5)
GAMMA GLOBULIN %: 26 % (ref 9–20)
PATHOLOGIST: ABNORMAL
PROT SERPL-MCNC: 8.5 G/DL (ref 6.4–8.3)
PROTEIN ELECTROPHORESIS, SERUM: ABNORMAL
TOTAL PROT. SUM,%: 101 % (ref 98–102)
TOTAL PROT. SUM: 8.5 G/DL (ref 6.3–8.2)

## 2023-05-05 ENCOUNTER — TELEPHONE (OUTPATIENT)
Dept: ONCOLOGY | Age: 73
End: 2023-05-05

## 2023-05-05 ENCOUNTER — OFFICE VISIT (OUTPATIENT)
Dept: ONCOLOGY | Age: 73
End: 2023-05-05
Payer: MEDICARE

## 2023-05-05 VITALS
SYSTOLIC BLOOD PRESSURE: 134 MMHG | RESPIRATION RATE: 18 BRPM | HEART RATE: 88 BPM | OXYGEN SATURATION: 98 % | WEIGHT: 293 LBS | BODY MASS INDEX: 46.6 KG/M2 | TEMPERATURE: 98.3 F | DIASTOLIC BLOOD PRESSURE: 86 MMHG

## 2023-05-05 DIAGNOSIS — D47.2 MONOCLONAL GAMMOPATHY: Primary | ICD-10-CM

## 2023-05-05 PROBLEM — D47.3 ESSENTIAL (HEMORRHAGIC) THROMBOCYTHEMIA (HCC): Status: RESOLVED | Noted: 2022-03-23 | Resolved: 2023-05-05

## 2023-05-05 PROCEDURE — 1090F PRES/ABSN URINE INCON ASSESS: CPT | Performed by: INTERNAL MEDICINE

## 2023-05-05 PROCEDURE — 4004F PT TOBACCO SCREEN RCVD TLK: CPT | Performed by: INTERNAL MEDICINE

## 2023-05-05 PROCEDURE — 99214 OFFICE O/P EST MOD 30 MIN: CPT | Performed by: INTERNAL MEDICINE

## 2023-05-05 PROCEDURE — G8427 DOCREV CUR MEDS BY ELIG CLIN: HCPCS | Performed by: INTERNAL MEDICINE

## 2023-05-05 PROCEDURE — 3017F COLORECTAL CA SCREEN DOC REV: CPT | Performed by: INTERNAL MEDICINE

## 2023-05-05 PROCEDURE — G8417 CALC BMI ABV UP PARAM F/U: HCPCS | Performed by: INTERNAL MEDICINE

## 2023-05-05 PROCEDURE — G8399 PT W/DXA RESULTS DOCUMENT: HCPCS | Performed by: INTERNAL MEDICINE

## 2023-05-05 PROCEDURE — 3079F DIAST BP 80-89 MM HG: CPT | Performed by: INTERNAL MEDICINE

## 2023-05-05 PROCEDURE — 99211 OFF/OP EST MAY X REQ PHY/QHP: CPT | Performed by: INTERNAL MEDICINE

## 2023-05-05 PROCEDURE — 3075F SYST BP GE 130 - 139MM HG: CPT | Performed by: INTERNAL MEDICINE

## 2023-05-05 PROCEDURE — 1123F ACP DISCUSS/DSCN MKR DOCD: CPT | Performed by: INTERNAL MEDICINE

## 2023-05-05 RX ORDER — NICOTINE 21 MG/24HR
1 PATCH, TRANSDERMAL 24 HOURS TRANSDERMAL DAILY
Qty: 42 PATCH | Refills: 0 | Status: SHIPPED | OUTPATIENT
Start: 2023-05-05 | End: 2023-06-16

## 2023-05-05 RX ORDER — LEVOTHYROXINE SODIUM 0.03 MG/1
TABLET ORAL
COMMUNITY
Start: 2023-04-26

## 2023-05-05 NOTE — TELEPHONE ENCOUNTER
Savi Dela Cruz MD VISIT  Rv in 6 months, need cbc, cmp spep and light chains prior to Rv   LABS CDP CMP SPEP LT CHAINS ORDERS GIVEN TO PT ON EXIT TO BE DONE ON 11/3/23  MD VISIT 11/10/23 @ 11:15AM  AVS PRINTED W/ INSTRUCTIONS AND GIVEN TO PT ON EXIT

## 2023-05-05 NOTE — PROGRESS NOTES
monoclonal protein is fairly stable  We will continue surveillance every 6 months  Continue B12 supplementation  Case was discussed with primary team.  I advised the patient to continue taking the Synthroid on empty stomach.   We will watch her TSH closely         Franca Man MD  Hematologist/[facility] hematology oncology physicians

## 2023-11-02 ENCOUNTER — HOSPITAL ENCOUNTER (OUTPATIENT)
Age: 73
Discharge: HOME | End: 2023-11-02
Payer: MEDICARE

## 2023-11-02 DIAGNOSIS — D47.2 MONOCLONAL GAMMOPATHY: ICD-10-CM

## 2023-11-02 LAB
ALBUMIN SERPL-MCNC: 4.1 G/DL (ref 3.5–5.2)
ALP SERPL-CCNC: 119 U/L (ref 35–104)
ALT SERPL-CCNC: 14 U/L (ref 5–33)
ANION GAP SERPL CALCULATED.3IONS-SCNC: 11 MMOL/L (ref 9–17)
AST SERPL-CCNC: 13 U/L
BASOPHILS # BLD: 0.07 K/UL (ref 0–0.2)
BASOPHILS NFR BLD: 1 % (ref 0–2)
BILIRUB SERPL-MCNC: 0.4 MG/DL (ref 0.3–1.2)
BUN SERPL-MCNC: 18 MG/DL (ref 8–23)
BUN/CREAT SERPL: 14 (ref 9–20)
CALCIUM SERPL-MCNC: 10 MG/DL (ref 8.6–10.4)
CHLORIDE SERPL-SCNC: 98 MMOL/L (ref 98–107)
CO2 SERPL-SCNC: 31 MMOL/L (ref 20–31)
CREAT SERPL-MCNC: 1.3 MG/DL (ref 0.5–0.9)
EOSINOPHIL # BLD: 0.37 K/UL (ref 0–0.44)
EOSINOPHILS RELATIVE PERCENT: 3 % (ref 1–4)
ERYTHROCYTE [DISTWIDTH] IN BLOOD BY AUTOMATED COUNT: 17.3 % (ref 11.8–14.4)
FREE KAPPA/LAMBDA RATIO: 0.8 (ref 0.26–1.65)
GFR SERPL CREATININE-BSD FRML MDRD: 43 ML/MIN/1.73M2
GLUCOSE SERPL-MCNC: 99 MG/DL (ref 70–99)
HCT VFR BLD AUTO: 52.9 % (ref 36.3–47.1)
HGB BLD-MCNC: 15.6 G/DL (ref 11.9–15.1)
IMM GRANULOCYTES # BLD AUTO: 0.06 K/UL (ref 0–0.3)
IMM GRANULOCYTES NFR BLD: 1 %
KAPPA LC FREE SER-MCNC: 53.3 MG/L (ref 3.7–19.4)
LAMBDA LC FREE SERPL-MCNC: 66.4 MG/L (ref 5.7–26.3)
LYMPHOCYTES NFR BLD: 2.74 K/UL (ref 1.1–3.7)
LYMPHOCYTES RELATIVE PERCENT: 22 % (ref 24–43)
MCH RBC QN AUTO: 26 PG (ref 25.2–33.5)
MCHC RBC AUTO-ENTMCNC: 29.5 G/DL (ref 28.4–34.8)
MCV RBC AUTO: 88.3 FL (ref 82.6–102.9)
MONOCYTES NFR BLD: 0.86 K/UL (ref 0.1–1.2)
MONOCYTES NFR BLD: 7 % (ref 3–12)
NEUTROPHILS NFR BLD: 66 % (ref 36–65)
NEUTS SEG NFR BLD: 8.31 K/UL (ref 1.5–8.1)
NRBC BLD-RTO: 0 PER 100 WBC
PLATELET # BLD AUTO: 484 K/UL (ref 138–453)
PMV BLD AUTO: 9.2 FL (ref 8.1–13.5)
POTASSIUM SERPL-SCNC: 4.4 MMOL/L (ref 3.7–5.3)
PROT SERPL-MCNC: 9.1 G/DL (ref 6.4–8.3)
RBC # BLD AUTO: 5.99 M/UL (ref 3.95–5.11)
RBC # BLD: ABNORMAL 10*6/UL
SODIUM SERPL-SCNC: 140 MMOL/L (ref 135–144)
WBC OTHER # BLD: 12.4 K/UL (ref 3.5–11.3)

## 2023-11-02 PROCEDURE — 84165 PROTEIN E-PHORESIS SERUM: CPT

## 2023-11-02 PROCEDURE — 80053 COMPREHEN METABOLIC PANEL: CPT

## 2023-11-02 PROCEDURE — 85025 COMPLETE CBC W/AUTO DIFF WBC: CPT

## 2023-11-02 PROCEDURE — 84155 ASSAY OF PROTEIN SERUM: CPT

## 2023-11-02 PROCEDURE — 36415 COLL VENOUS BLD VENIPUNCTURE: CPT

## 2023-11-02 PROCEDURE — 83521 IG LIGHT CHAINS FREE EACH: CPT

## 2023-11-03 LAB
ALBUMIN PERCENT: 52 % (ref 45–65)
ALBUMIN SERPL-MCNC: 4.6 G/DL (ref 3.2–5.2)
ALPHA 2 PERCENT: 11 % (ref 6–13)
ALPHA1 GLOB SERPL ELPH-MCNC: 0.2 G/DL (ref 0.1–0.4)
ALPHA1 GLOB SERPL ELPH-MCNC: 3 % (ref 3–6)
ALPHA2 GLOB SERPL ELPH-MCNC: 0.9 G/DL (ref 0.5–0.9)
B-GLOBULIN SERPL ELPH-MCNC: 0.9 G/DL (ref 0.5–1.1)
B-GLOBULIN SERPL ELPH-MCNC: 10 % (ref 11–19)
GAMMA GLOB SERPL ELPH-MCNC: 2.2 G/DL (ref 0.5–1.5)
GAMMA GLOBULIN %: 25 % (ref 9–20)
PATHOLOGIST: ABNORMAL
PROT PATTERN SERPL ELPH-IMP: ABNORMAL
PROT SERPL-MCNC: 8.9 G/DL (ref 6.4–8.3)
TOTAL PROT. SUM,%: 101 % (ref 98–102)
TOTAL PROT. SUM: 8.8 G/DL (ref 6.3–8.2)

## 2023-11-10 ENCOUNTER — TELEPHONE (OUTPATIENT)
Dept: ONCOLOGY | Age: 73
End: 2023-11-10

## 2023-11-10 ENCOUNTER — OFFICE VISIT (OUTPATIENT)
Dept: ONCOLOGY | Age: 73
End: 2023-11-10
Payer: MEDICARE

## 2023-11-10 VITALS
HEART RATE: 52 BPM | DIASTOLIC BLOOD PRESSURE: 53 MMHG | TEMPERATURE: 97.8 F | WEIGHT: 293 LBS | BODY MASS INDEX: 47.08 KG/M2 | SYSTOLIC BLOOD PRESSURE: 102 MMHG | RESPIRATION RATE: 18 BRPM

## 2023-11-10 DIAGNOSIS — N18.31 STAGE 3A CHRONIC KIDNEY DISEASE (HCC): ICD-10-CM

## 2023-11-10 DIAGNOSIS — E53.8 LOW SERUM VITAMIN B12: ICD-10-CM

## 2023-11-10 DIAGNOSIS — D72.823 LEUKEMOID REACTION: ICD-10-CM

## 2023-11-10 DIAGNOSIS — I10 PRIMARY HYPERTENSION: ICD-10-CM

## 2023-11-10 DIAGNOSIS — D47.2 MGUS (MONOCLONAL GAMMOPATHY OF UNKNOWN SIGNIFICANCE): ICD-10-CM

## 2023-11-10 DIAGNOSIS — D47.2 MONOCLONAL GAMMOPATHY: Primary | ICD-10-CM

## 2023-11-10 PROCEDURE — 4004F PT TOBACCO SCREEN RCVD TLK: CPT | Performed by: INTERNAL MEDICINE

## 2023-11-10 PROCEDURE — 3017F COLORECTAL CA SCREEN DOC REV: CPT | Performed by: INTERNAL MEDICINE

## 2023-11-10 PROCEDURE — G8399 PT W/DXA RESULTS DOCUMENT: HCPCS | Performed by: INTERNAL MEDICINE

## 2023-11-10 PROCEDURE — 1123F ACP DISCUSS/DSCN MKR DOCD: CPT | Performed by: INTERNAL MEDICINE

## 2023-11-10 PROCEDURE — G8484 FLU IMMUNIZE NO ADMIN: HCPCS | Performed by: INTERNAL MEDICINE

## 2023-11-10 PROCEDURE — 3078F DIAST BP <80 MM HG: CPT | Performed by: INTERNAL MEDICINE

## 2023-11-10 PROCEDURE — 3074F SYST BP LT 130 MM HG: CPT | Performed by: INTERNAL MEDICINE

## 2023-11-10 PROCEDURE — 99214 OFFICE O/P EST MOD 30 MIN: CPT | Performed by: INTERNAL MEDICINE

## 2023-11-10 PROCEDURE — 99211 OFF/OP EST MAY X REQ PHY/QHP: CPT | Performed by: INTERNAL MEDICINE

## 2023-11-10 PROCEDURE — G8427 DOCREV CUR MEDS BY ELIG CLIN: HCPCS | Performed by: INTERNAL MEDICINE

## 2023-11-10 PROCEDURE — 1090F PRES/ABSN URINE INCON ASSESS: CPT | Performed by: INTERNAL MEDICINE

## 2023-11-10 PROCEDURE — G8417 CALC BMI ABV UP PARAM F/U: HCPCS | Performed by: INTERNAL MEDICINE

## 2023-11-10 RX ORDER — LOSARTAN POTASSIUM 100 MG/1
100 TABLET ORAL DAILY
Qty: 90 TABLET | Refills: 1 | Status: SHIPPED | OUTPATIENT
Start: 2023-11-10

## 2023-11-10 NOTE — TELEPHONE ENCOUNTER
Cesar Dickinson MD VISIT  Rv in 6 months , need cbc, cmp spep and light chains prior to RV   LABS ORDERS GIVEN TO PT ON EXIT TO BE DONE 5/3/24  MD VISIT 5/10/24 @ 11:15AM  AVS PRINTED W/ INSTRUCTIONS AND GIVEN TO PT ON EXIT

## 2023-11-10 NOTE — PROGRESS NOTES
nontender. No hepatosplenomegaly. Extremities: Lower extremities show bilateral trace edema improved. No clubbing, or cyanosis. Breasts: Examination not done today. Neuro exam: intact cranial nerves bilaterally no motor or sensory deficit, gait is normal. Lymphatic: no adenopathy appreciated in the supraclavicular, axillary, cervical or inguinal area    REVIEW OF LABORATORY DATA:   Lab Results   Component Value Date    WBC 12.4 (H) 11/02/2023    HGB 15.6 (H) 11/02/2023    HCT 52.9 (H) 11/02/2023    MCV 88.3 11/02/2023     (H) 11/02/2023       Chemistry        Component Value Date/Time     11/02/2023 1054    K 4.4 11/02/2023 1054    CL 98 11/02/2023 1054    CO2 31 11/02/2023 1054    BUN 18 11/02/2023 1054    CREATININE 1.3 (H) 11/02/2023 1054        Component Value Date/Time    CALCIUM 10.0 11/02/2023 1054    ALKPHOS 119 (H) 11/02/2023 1054    AST 13 11/02/2023 1054    ALT 14 11/02/2023 1054    BILITOT 0.4 11/02/2023 1054        Lab Results   Component Value Date    [id number] >2000 (H) 09/21/2022       SPEP 11/2023  IgG LAMBDA. THE APPROXIMATE DENSITOMETRIC QUANTITATION OF PARAPROTEIN IS   0.92 G/DL. SPEP 04/2022  IgG, Lambda. THE APPROXIMATE DENSITOMETRIC QUANTITATION OF PARAPROTEIN IS 0.96 G/DL  SPEP 09/2022  IgG, Lambda. THE APPROXIMATE DENSITOMETRIC QUANTITATION OF PARAPROTEIN IS 0.81 G/DL. SPEP 4/24/23  IGG LAMBDA. THE APPROXIMATE DENSITOMETRIC QUANTITATION OF PARAPROTEIN IS   0.85 G/DL. REVIEW OF RADIOLOGICAL RESULTS:     IMPRESSION:   Leukocytosis  HX hypertensin and hypothyroidism   Low B12 - plan for supplementation  MGUS, stable, no signs of active myeloma  Hypothyroidism, discussed with primary care. I found that the patient was taking the thyroid medication after meals. I asked her to take them on empty stomach. We need to watch her TSH closely. Smoking addiction -we had a long discussion.   She finally agreed to try the patches    PLAN:   Had a long discussion with  the

## 2024-05-03 ENCOUNTER — HOSPITAL ENCOUNTER (OUTPATIENT)
Age: 74
Setting detail: SPECIMEN
Discharge: HOME | End: 2024-05-03
Payer: MEDICARE

## 2024-05-03 DIAGNOSIS — D47.2 MONOCLONAL GAMMOPATHY: ICD-10-CM

## 2024-05-03 DIAGNOSIS — D47.2 MGUS (MONOCLONAL GAMMOPATHY OF UNKNOWN SIGNIFICANCE): ICD-10-CM

## 2024-05-03 DIAGNOSIS — N18.31 STAGE 3A CHRONIC KIDNEY DISEASE (HCC): ICD-10-CM

## 2024-05-03 DIAGNOSIS — D72.823 LEUKEMOID REACTION: ICD-10-CM

## 2024-05-03 LAB
ALBUMIN PERCENT: NORMAL %
ALBUMIN SERPL-MCNC: 3.9 G/DL (ref 3.5–5.2)
ALBUMIN SERPL-MCNC: NORMAL G/DL
ALBUMIN/GLOB SERPL: 1 {RATIO} (ref 1–2.5)
ALP SERPL-CCNC: 91 U/L (ref 35–104)
ALPHA 2 PERCENT: NORMAL %
ALPHA1 GLOB SERPL ELPH-MCNC: NORMAL %
ALPHA1 GLOB SERPL ELPH-MCNC: NORMAL G/DL
ALPHA2 GLOB SERPL ELPH-MCNC: NORMAL G/DL
ALT SERPL-CCNC: 12 U/L (ref 10–35)
ANION GAP SERPL CALCULATED.3IONS-SCNC: 13 MMOL/L (ref 9–16)
AST SERPL-CCNC: 21 U/L (ref 10–35)
B-GLOBULIN SERPL ELPH-MCNC: NORMAL %
B-GLOBULIN SERPL ELPH-MCNC: NORMAL G/DL
BASOPHILS # BLD: 0.09 K/UL (ref 0–0.2)
BASOPHILS NFR BLD: 1 % (ref 0–2)
BILIRUB SERPL-MCNC: 0.4 MG/DL (ref 0–1.2)
BUN SERPL-MCNC: 7 MG/DL (ref 8–23)
CALCIUM SERPL-MCNC: 9.8 MG/DL (ref 8.6–10.4)
CHLORIDE SERPL-SCNC: 103 MMOL/L (ref 98–107)
CO2 SERPL-SCNC: 27 MMOL/L (ref 20–31)
CREAT SERPL-MCNC: 1 MG/DL (ref 0.5–0.9)
EOSINOPHIL # BLD: 0.41 K/UL (ref 0–0.44)
EOSINOPHILS RELATIVE PERCENT: 3 % (ref 1–4)
ERYTHROCYTE [DISTWIDTH] IN BLOOD BY AUTOMATED COUNT: 15.5 % (ref 11.8–14.4)
FREE KAPPA/LAMBDA RATIO: 1.16 (ref 0.22–1.74)
GAMMA GLOB SERPL ELPH-MCNC: NORMAL G/DL
GAMMA GLOBULIN %: NORMAL %
GFR, ESTIMATED: 59 ML/MIN/1.73M2
GLUCOSE SERPL-MCNC: 99 MG/DL (ref 74–99)
HCT VFR BLD AUTO: 47.3 % (ref 36.3–47.1)
HGB BLD-MCNC: 13.8 G/DL (ref 11.9–15.1)
IMM GRANULOCYTES # BLD AUTO: 0.05 K/UL (ref 0–0.3)
IMM GRANULOCYTES NFR BLD: 0 %
KAPPA LC FREE SER-MCNC: 105 MG/L
LAMBDA LC FREE SERPL-MCNC: 90.2 MG/L (ref 4.2–27.7)
LYMPHOCYTES NFR BLD: 3 K/UL (ref 1.1–3.7)
LYMPHOCYTES RELATIVE PERCENT: 23 % (ref 24–43)
M PROTEIN 2 SERPL ELPH-MCNC: NORMAL G/DL
M PROTEIN SERPL ELPH-MCNC: NORMAL G/DL
MCH RBC QN AUTO: 26.1 PG (ref 25.2–33.5)
MCHC RBC AUTO-ENTMCNC: 29.2 G/DL (ref 28.4–34.8)
MCV RBC AUTO: 89.6 FL (ref 82.6–102.9)
MONOCYTES NFR BLD: 0.94 K/UL (ref 0.1–1.2)
MONOCYTES NFR BLD: 7 % (ref 3–12)
NEUTROPHILS NFR BLD: 66 % (ref 36–65)
NEUTS SEG NFR BLD: 8.58 K/UL (ref 1.5–8.1)
NRBC BLD-RTO: 0 PER 100 WBC
PATHOLOGIST: NORMAL
PLATELET # BLD AUTO: 387 K/UL (ref 138–453)
PMV BLD AUTO: 9.7 FL (ref 8.1–13.5)
POTASSIUM SERPL-SCNC: 4.1 MMOL/L (ref 3.7–5.3)
PROT PATTERN SERPL ELPH-IMP: NORMAL
PROT SERPL-MCNC: 8 G/DL (ref 6.6–8.7)
PROT SERPL-MCNC: 8.3 G/DL (ref 6.6–8.7)
RBC # BLD AUTO: 5.28 M/UL (ref 3.95–5.11)
RBC # BLD: ABNORMAL 10*6/UL
SODIUM SERPL-SCNC: 143 MMOL/L (ref 136–145)
TOTAL PROT. SUM,%: NORMAL %
TOTAL PROT. SUM: NORMAL G/DL
WBC OTHER # BLD: 13.1 K/UL (ref 3.5–11.3)

## 2024-05-03 PROCEDURE — 84155 ASSAY OF PROTEIN SERUM: CPT

## 2024-05-03 PROCEDURE — 84165 PROTEIN E-PHORESIS SERUM: CPT

## 2024-05-03 PROCEDURE — 85025 COMPLETE CBC W/AUTO DIFF WBC: CPT

## 2024-05-03 PROCEDURE — 80053 COMPREHEN METABOLIC PANEL: CPT

## 2024-05-03 PROCEDURE — 83521 IG LIGHT CHAINS FREE EACH: CPT

## 2024-05-03 PROCEDURE — 36415 COLL VENOUS BLD VENIPUNCTURE: CPT

## 2024-05-09 LAB
ALBUMIN PERCENT: 51 % (ref 45–65)
ALBUMIN SERPL-MCNC: 4.1 G/DL (ref 3.2–5.2)
ALPHA 2 PERCENT: 11 % (ref 6–13)
ALPHA1 GLOB SERPL ELPH-MCNC: 0.2 G/DL (ref 0.1–0.4)
ALPHA1 GLOB SERPL ELPH-MCNC: 3 % (ref 3–6)
ALPHA2 GLOB SERPL ELPH-MCNC: 0.9 G/DL (ref 0.5–0.9)
B-GLOBULIN SERPL ELPH-MCNC: 0.8 G/DL (ref 0.7–1.4)
B-GLOBULIN SERPL ELPH-MCNC: 10 % (ref 11–19)
GAMMA GLOB SERPL ELPH-MCNC: 2.1 G/DL (ref 0.5–1.5)
GAMMA GLOBULIN %: 26 % (ref 9–20)
PATHOLOGIST: ABNORMAL
PROT PATTERN SERPL ELPH-IMP: ABNORMAL
PROT SERPL-MCNC: 8 G/DL (ref 6.6–8.7)
TOTAL PROT. SUM,%: 101 % (ref 98–102)
TOTAL PROT. SUM: 8.1 G/DL (ref 6.3–8.2)

## 2024-05-10 ENCOUNTER — TELEPHONE (OUTPATIENT)
Dept: ONCOLOGY | Age: 74
End: 2024-05-10

## 2024-05-10 ENCOUNTER — OFFICE VISIT (OUTPATIENT)
Dept: ONCOLOGY | Age: 74
End: 2024-05-10
Payer: MEDICARE

## 2024-05-10 VITALS
BODY MASS INDEX: 47.64 KG/M2 | WEIGHT: 293 LBS | DIASTOLIC BLOOD PRESSURE: 94 MMHG | SYSTOLIC BLOOD PRESSURE: 137 MMHG | RESPIRATION RATE: 16 BRPM | HEART RATE: 68 BPM | TEMPERATURE: 97.3 F

## 2024-05-10 DIAGNOSIS — I10 PRIMARY HYPERTENSION: ICD-10-CM

## 2024-05-10 DIAGNOSIS — D72.823 LEUKEMOID REACTION: ICD-10-CM

## 2024-05-10 DIAGNOSIS — N18.31 STAGE 3A CHRONIC KIDNEY DISEASE (HCC): ICD-10-CM

## 2024-05-10 DIAGNOSIS — D47.2 MGUS (MONOCLONAL GAMMOPATHY OF UNKNOWN SIGNIFICANCE): ICD-10-CM

## 2024-05-10 DIAGNOSIS — D47.2 MONOCLONAL GAMMOPATHY: Primary | ICD-10-CM

## 2024-05-10 PROCEDURE — G8427 DOCREV CUR MEDS BY ELIG CLIN: HCPCS | Performed by: INTERNAL MEDICINE

## 2024-05-10 PROCEDURE — 3017F COLORECTAL CA SCREEN DOC REV: CPT | Performed by: INTERNAL MEDICINE

## 2024-05-10 PROCEDURE — 99214 OFFICE O/P EST MOD 30 MIN: CPT | Performed by: INTERNAL MEDICINE

## 2024-05-10 PROCEDURE — 4004F PT TOBACCO SCREEN RCVD TLK: CPT | Performed by: INTERNAL MEDICINE

## 2024-05-10 PROCEDURE — 99211 OFF/OP EST MAY X REQ PHY/QHP: CPT

## 2024-05-10 PROCEDURE — 1123F ACP DISCUSS/DSCN MKR DOCD: CPT | Performed by: INTERNAL MEDICINE

## 2024-05-10 PROCEDURE — 3080F DIAST BP >= 90 MM HG: CPT | Performed by: INTERNAL MEDICINE

## 2024-05-10 PROCEDURE — G8399 PT W/DXA RESULTS DOCUMENT: HCPCS | Performed by: INTERNAL MEDICINE

## 2024-05-10 PROCEDURE — 1090F PRES/ABSN URINE INCON ASSESS: CPT | Performed by: INTERNAL MEDICINE

## 2024-05-10 PROCEDURE — G8417 CALC BMI ABV UP PARAM F/U: HCPCS | Performed by: INTERNAL MEDICINE

## 2024-05-10 PROCEDURE — 3075F SYST BP GE 130 - 139MM HG: CPT | Performed by: INTERNAL MEDICINE

## 2024-05-10 NOTE — TELEPHONE ENCOUNTER
LAURYN HERE FOR FOLLOW UP   Rv in 5 months, need cbc, cmp spep and light chains, bone survey prior to RV   MD VISIT: 10/11/24 @ 2:15PM   LABS AND XRAY ORDER PRINTED AND GIVEN ON EXIT   PT WILL GET DONE 1 WK BEFORE MD VISIT   AVS PRINTED AND GIVEN ON EXIT

## 2024-05-10 NOTE — PROGRESS NOTES
stomach.  We need to watch her TSH closely.  Smoking addiction -we had a long discussion.  She finally agreed to try the patches    PLAN:   I had a very long discussion with the patient.  Overall, her hemoglobin and kidney function seems to be stable.  However, very concerned by her increasing light chain.  Both light chain kappa and lambda have increased with stable ratio.  The monoclonal protein seems to be stable.  The patient continues to have significant bone symptoms.  Will obtain bone imaging by next visit to exclude lytic lesion but I think a lot of her symptoms are secondary to arthritis  We talked about dietary management and smoking cessation and she will try  Blood pressure still poorly controlled despite multiple adjustment of blood pressure medication.  Will continue to work with her primary team and nephrology to try to adjust medication  Return in 4 to 5 months.  We will restage her disease.  Likely that she will will need a bone aspiration biopsy if there are more signs of progressive myeloma.  Patient verbalized understanding and agreement    Handicap placard was given to the patient today for 3 years       Raman Man MD  Hematologist/Medical Oncologist  [facility]

## 2024-05-13 RX ORDER — LOSARTAN POTASSIUM 100 MG/1
100 TABLET ORAL DAILY
Qty: 90 TABLET | Refills: 1 | Status: SHIPPED | OUTPATIENT
Start: 2024-05-13

## 2024-09-30 NOTE — TELEPHONE ENCOUNTER
----- Message from Steven Dela Cruz MD sent at 3/11/2022  9:02 AM EST -----  Please call pcp office to follow up on medical clearance and pulmonology evaluation requested prior to proceeding with her intracept procedure  This request was made 1 month ago Pt/ot  Follow up with pcp

## 2024-10-02 ENCOUNTER — HOSPITAL ENCOUNTER (OUTPATIENT)
Dept: GENERAL RADIOLOGY | Age: 74
Discharge: HOME | End: 2024-10-04
Payer: MEDICARE

## 2024-10-02 ENCOUNTER — HOSPITAL ENCOUNTER (OUTPATIENT)
Age: 74
Discharge: HOME | End: 2024-10-04
Payer: MEDICARE

## 2024-10-02 ENCOUNTER — HOSPITAL ENCOUNTER (OUTPATIENT)
Age: 74
Discharge: HOME | End: 2024-10-02
Payer: MEDICARE

## 2024-10-02 DIAGNOSIS — D72.823 LEUKEMOID REACTION: ICD-10-CM

## 2024-10-02 DIAGNOSIS — D47.2 MONOCLONAL GAMMOPATHY: ICD-10-CM

## 2024-10-02 DIAGNOSIS — D47.2 MGUS (MONOCLONAL GAMMOPATHY OF UNKNOWN SIGNIFICANCE): ICD-10-CM

## 2024-10-02 LAB
ALBUMIN SERPL-MCNC: 4 G/DL (ref 3.5–5.2)
ALP SERPL-CCNC: 103 U/L (ref 35–104)
ALT SERPL-CCNC: 10 U/L (ref 5–33)
ANION GAP SERPL CALCULATED.3IONS-SCNC: 10 MMOL/L (ref 9–17)
AST SERPL-CCNC: 12 U/L
BASOPHILS # BLD: 0.09 K/UL (ref 0–0.2)
BASOPHILS NFR BLD: 1 % (ref 0–2)
BILIRUB SERPL-MCNC: 0.3 MG/DL (ref 0.3–1.2)
BUN SERPL-MCNC: 21 MG/DL (ref 8–23)
BUN/CREAT SERPL: 18 (ref 9–20)
CALCIUM SERPL-MCNC: 9.8 MG/DL (ref 8.6–10.4)
CHLORIDE SERPL-SCNC: 103 MMOL/L (ref 98–107)
CO2 SERPL-SCNC: 28 MMOL/L (ref 20–31)
CREAT SERPL-MCNC: 1.2 MG/DL (ref 0.5–0.9)
EOSINOPHIL # BLD: 0.34 K/UL (ref 0–0.44)
EOSINOPHILS RELATIVE PERCENT: 2 % (ref 1–4)
ERYTHROCYTE [DISTWIDTH] IN BLOOD BY AUTOMATED COUNT: 14.8 % (ref 11.8–14.4)
FREE KAPPA/LAMBDA RATIO: 0.6 (ref 0.22–1.74)
GFR, ESTIMATED: 47 ML/MIN/1.73M2
GLUCOSE SERPL-MCNC: 106 MG/DL (ref 70–99)
HCT VFR BLD AUTO: 48.1 % (ref 36.3–47.1)
HGB BLD-MCNC: 14.5 G/DL (ref 11.9–15.1)
IMM GRANULOCYTES # BLD AUTO: 0.07 K/UL (ref 0–0.3)
IMM GRANULOCYTES NFR BLD: 0 %
KAPPA LC FREE SER-MCNC: 75.2 MG/L
LAMBDA LC FREE SERPL-MCNC: 126 MG/L (ref 4.2–27.7)
LYMPHOCYTES NFR BLD: 2.9 K/UL (ref 1.1–3.7)
LYMPHOCYTES RELATIVE PERCENT: 19 % (ref 24–43)
MCH RBC QN AUTO: 27.2 PG (ref 25.2–33.5)
MCHC RBC AUTO-ENTMCNC: 30.1 G/DL (ref 28.4–34.8)
MCV RBC AUTO: 90.1 FL (ref 82.6–102.9)
MONOCYTES NFR BLD: 1.09 K/UL (ref 0.1–1.2)
MONOCYTES NFR BLD: 7 % (ref 3–12)
NEUTROPHILS NFR BLD: 71 % (ref 36–65)
NEUTS SEG NFR BLD: 11.13 K/UL (ref 1.5–8.1)
NRBC BLD-RTO: 0 PER 100 WBC
PLATELET # BLD AUTO: 487 K/UL (ref 138–453)
PMV BLD AUTO: 9 FL (ref 8.1–13.5)
POTASSIUM SERPL-SCNC: 4.4 MMOL/L (ref 3.7–5.3)
PROT SERPL-MCNC: 8.9 G/DL (ref 6.4–8.3)
RBC # BLD AUTO: 5.34 M/UL (ref 3.95–5.11)
RBC # BLD: ABNORMAL 10*6/UL
SODIUM SERPL-SCNC: 141 MMOL/L (ref 135–144)
WBC OTHER # BLD: 15.6 K/UL (ref 3.5–11.3)

## 2024-10-02 PROCEDURE — 36415 COLL VENOUS BLD VENIPUNCTURE: CPT

## 2024-10-02 PROCEDURE — 80053 COMPREHEN METABOLIC PANEL: CPT

## 2024-10-02 PROCEDURE — 85025 COMPLETE CBC W/AUTO DIFF WBC: CPT

## 2024-10-02 PROCEDURE — 83521 IG LIGHT CHAINS FREE EACH: CPT

## 2024-10-02 PROCEDURE — 77075 RADEX OSSEOUS SURVEY COMPL: CPT

## 2024-10-11 ENCOUNTER — TELEPHONE (OUTPATIENT)
Dept: ONCOLOGY | Age: 74
End: 2024-10-11

## 2024-10-11 ENCOUNTER — OFFICE VISIT (OUTPATIENT)
Dept: ONCOLOGY | Age: 74
End: 2024-10-11
Payer: MEDICARE

## 2024-10-11 VITALS
HEART RATE: 82 BPM | RESPIRATION RATE: 16 BRPM | SYSTOLIC BLOOD PRESSURE: 176 MMHG | TEMPERATURE: 96.2 F | DIASTOLIC BLOOD PRESSURE: 95 MMHG | WEIGHT: 288.1 LBS | BODY MASS INDEX: 45.12 KG/M2

## 2024-10-11 DIAGNOSIS — D47.2 MONOCLONAL GAMMOPATHY: Primary | ICD-10-CM

## 2024-10-11 PROCEDURE — G8484 FLU IMMUNIZE NO ADMIN: HCPCS | Performed by: INTERNAL MEDICINE

## 2024-10-11 PROCEDURE — 99211 OFF/OP EST MAY X REQ PHY/QHP: CPT | Performed by: INTERNAL MEDICINE

## 2024-10-11 PROCEDURE — G8427 DOCREV CUR MEDS BY ELIG CLIN: HCPCS | Performed by: INTERNAL MEDICINE

## 2024-10-11 PROCEDURE — 3080F DIAST BP >= 90 MM HG: CPT | Performed by: INTERNAL MEDICINE

## 2024-10-11 PROCEDURE — 99213 OFFICE O/P EST LOW 20 MIN: CPT | Performed by: INTERNAL MEDICINE

## 2024-10-11 PROCEDURE — G8399 PT W/DXA RESULTS DOCUMENT: HCPCS | Performed by: INTERNAL MEDICINE

## 2024-10-11 PROCEDURE — G8417 CALC BMI ABV UP PARAM F/U: HCPCS | Performed by: INTERNAL MEDICINE

## 2024-10-11 PROCEDURE — 3017F COLORECTAL CA SCREEN DOC REV: CPT | Performed by: INTERNAL MEDICINE

## 2024-10-11 PROCEDURE — 3077F SYST BP >= 140 MM HG: CPT | Performed by: INTERNAL MEDICINE

## 2024-10-11 PROCEDURE — 1090F PRES/ABSN URINE INCON ASSESS: CPT | Performed by: INTERNAL MEDICINE

## 2024-10-11 PROCEDURE — 1123F ACP DISCUSS/DSCN MKR DOCD: CPT | Performed by: INTERNAL MEDICINE

## 2024-10-11 PROCEDURE — 4004F PT TOBACCO SCREEN RCVD TLK: CPT | Performed by: INTERNAL MEDICINE

## 2024-10-11 NOTE — PROGRESS NOTES
DIAGNOSIS:   Leukocytosis, thrombocytosis, likely leukemoid reaction  MGUS  Low B12    CURRENT THERAPY:  Monthly B12  Observation for MGUS     BRIEF CASE HISTORY:   Camila Henao is a very pleasant 74 y.o. female who is referred to us for leukocytosis. She has history of spinal damage and back pain, she has had several procedures. She has history of hypertension and hypothyroidism.     Her sister and aunt had breast, sister had leukemia. The patient has history of cysts but no malignancy, her last mammogram was 12/2020 with negative results. She smokes a pack every 3 days.   Cancer screening, CT of the chest was done in 2019 and was essentially negative.  Mammogram December/2020 that was negative.  Last colonoscopy was also in 2019 and showed multiple adenomatous polyps but no cancer.  Work-up for the patient showed negative JAK2 mutation and negative BCR ABL, she had small MGUS under 1 g. She had normal kidney function and normal hemoglobin and no hypercalcemia.  We decided to observe.    INTERIM HISTORY: The patient presents for follow up today to review work up with MGUS surveillance.    Patient feels okay continues to struggle with fatigue and arthralgias.  PAST MEDICAL HISTORY: has a past medical history of Alkaline phosphatase elevation, Arthritis, Brain aneurysm, Bronchitis, Dyspepsia, Exercise counseling, Frequent urination, Headache(784.0), Hypertension, Leukocytosis, Major depressive disorder, Obesity, Stroke (HCC), Thyroid disease, and Viral upper respiratory tract infection.    PAST SURGICAL HISTORY: has a past surgical history that includes brain surgery; Tubal ligation; Hysterectomy; ovarian cyst removal; cyst removal; Appendectomy; Colonoscopy; and Cardiac surgery.     CURRENT MEDICATIONS:  has a current medication list which includes the following prescription(s): losartan, vitamin b-12, ibuprofen, vitamin d, aspirin, levothyroxine, nicotine, levothyroxine, venlafaxine, liothyronine, breo

## 2024-10-11 NOTE — TELEPHONE ENCOUNTER
LAURYN HERE FOR MD VISIT  Rv in one year, need cbc, cmp SPEP and light chains prior to RV   LAB ORDERS MAILED TO PT TO BE DONE  10/3/25  MD VISIT 10/10/25 @ 2PM  AVS PRINTED W/ INSTRUCTIONS AND GIVEN TO PT ON EXIT

## 2024-12-02 RX ORDER — LOSARTAN POTASSIUM 100 MG/1
100 TABLET ORAL DAILY
Qty: 90 TABLET | Refills: 1 | Status: SHIPPED | OUTPATIENT
Start: 2024-12-02

## 2025-02-24 ENCOUNTER — TRANSCRIBE ORDERS (OUTPATIENT)
Dept: ADMINISTRATIVE | Age: 75
End: 2025-02-24

## 2025-02-24 DIAGNOSIS — F17.210 CIGARETTE SMOKER: Primary | ICD-10-CM

## 2025-02-24 DIAGNOSIS — Z87.891 HISTORY OF TOBACCO USE: ICD-10-CM

## 2025-03-31 ENCOUNTER — TRANSCRIBE ORDERS (OUTPATIENT)
Dept: ADMINISTRATIVE | Age: 75
End: 2025-03-31

## 2025-03-31 DIAGNOSIS — Z78.0 POSTMENOPAUSAL STATUS (AGE-RELATED) (NATURAL): Primary | ICD-10-CM

## 2025-04-22 ENCOUNTER — HOSPITAL ENCOUNTER (OUTPATIENT)
Dept: MAMMOGRAPHY | Age: 75
Discharge: HOME | End: 2025-04-24
Payer: MEDICARE

## 2025-04-22 DIAGNOSIS — Z78.0 POSTMENOPAUSAL STATUS (AGE-RELATED) (NATURAL): ICD-10-CM

## 2025-04-22 PROCEDURE — 77080 DXA BONE DENSITY AXIAL: CPT
